# Patient Record
Sex: MALE | Race: WHITE | Employment: UNEMPLOYED | URBAN - METROPOLITAN AREA
[De-identification: names, ages, dates, MRNs, and addresses within clinical notes are randomized per-mention and may not be internally consistent; named-entity substitution may affect disease eponyms.]

---

## 2024-10-27 ENCOUNTER — HOSPITAL ENCOUNTER (INPATIENT)
Age: 89
LOS: 13 days | Discharge: HOME | DRG: 057 | End: 2024-11-09
Attending: STUDENT IN AN ORGANIZED HEALTH CARE EDUCATION/TRAINING PROGRAM | Admitting: STUDENT IN AN ORGANIZED HEALTH CARE EDUCATION/TRAINING PROGRAM
Payer: MEDICARE

## 2024-10-27 DIAGNOSIS — R00.1 BRADYCARDIA: ICD-10-CM

## 2024-10-27 DIAGNOSIS — I63.9 ACUTE CVA (CEREBROVASCULAR ACCIDENT) (HCC): Primary | ICD-10-CM

## 2024-10-27 LAB
GLUCOSE BLD-MCNC: 110 MG/DL (ref 70–99)
GLUCOSE BLD-MCNC: 129 MG/DL (ref 70–99)
PERFORMED ON: ABNORMAL
PERFORMED ON: ABNORMAL

## 2024-10-27 PROCEDURE — 1280000000 HC REHAB R&B

## 2024-10-27 PROCEDURE — 1200000000 HC SEMI PRIVATE

## 2024-10-27 PROCEDURE — 6370000000 HC RX 637 (ALT 250 FOR IP): Performed by: STUDENT IN AN ORGANIZED HEALTH CARE EDUCATION/TRAINING PROGRAM

## 2024-10-27 RX ORDER — GLUCAGON 1 MG/ML
1 KIT INJECTION PRN
Status: DISCONTINUED | OUTPATIENT
Start: 2024-10-27 | End: 2024-11-09 | Stop reason: HOSPADM

## 2024-10-27 RX ORDER — BISACODYL 5 MG/1
10 TABLET, DELAYED RELEASE ORAL DAILY PRN
Status: DISCONTINUED | OUTPATIENT
Start: 2024-10-27 | End: 2024-11-09 | Stop reason: HOSPADM

## 2024-10-27 RX ORDER — ATORVASTATIN CALCIUM 40 MG/1
40 TABLET, FILM COATED ORAL NIGHTLY
Status: DISCONTINUED | OUTPATIENT
Start: 2024-10-27 | End: 2024-11-09 | Stop reason: HOSPADM

## 2024-10-27 RX ORDER — AMLODIPINE BESYLATE 5 MG/1
10 TABLET ORAL DAILY
Status: DISCONTINUED | OUTPATIENT
Start: 2024-10-28 | End: 2024-11-09 | Stop reason: HOSPADM

## 2024-10-27 RX ORDER — AMLODIPINE BESYLATE 10 MG/1
10 TABLET ORAL DAILY
Status: ON HOLD | COMMUNITY
End: 2024-11-08

## 2024-10-27 RX ORDER — POLYETHYLENE GLYCOL 3350 17 G/17G
17 POWDER, FOR SOLUTION ORAL DAILY
Status: ON HOLD | COMMUNITY
End: 2024-11-08

## 2024-10-27 RX ORDER — MECOBALAMIN 5000 MCG
5 TABLET,DISINTEGRATING ORAL NIGHTLY PRN
Status: DISCONTINUED | OUTPATIENT
Start: 2024-10-27 | End: 2024-11-09 | Stop reason: HOSPADM

## 2024-10-27 RX ORDER — BISACODYL 10 MG
10 SUPPOSITORY, RECTAL RECTAL DAILY PRN
Status: DISCONTINUED | OUTPATIENT
Start: 2024-10-27 | End: 2024-11-09 | Stop reason: HOSPADM

## 2024-10-27 RX ORDER — ONDANSETRON 4 MG/1
4 TABLET, ORALLY DISINTEGRATING ORAL EVERY 8 HOURS PRN
Status: DISCONTINUED | OUTPATIENT
Start: 2024-10-27 | End: 2024-11-09 | Stop reason: HOSPADM

## 2024-10-27 RX ORDER — HEPARIN SODIUM 5000 [USP'U]/ML
5000 INJECTION, SOLUTION INTRAVENOUS; SUBCUTANEOUS EVERY 8 HOURS SCHEDULED
Status: DISCONTINUED | OUTPATIENT
Start: 2024-10-28 | End: 2024-11-09 | Stop reason: HOSPADM

## 2024-10-27 RX ORDER — BISACODYL 5 MG/1
10 TABLET, DELAYED RELEASE ORAL DAILY PRN
Status: ON HOLD | COMMUNITY
End: 2024-11-08

## 2024-10-27 RX ORDER — ASPIRIN 325 MG
325 TABLET ORAL DAILY
Status: ON HOLD | COMMUNITY
End: 2024-11-08

## 2024-10-27 RX ORDER — ASPIRIN 325 MG
325 TABLET, DELAYED RELEASE (ENTERIC COATED) ORAL DAILY
Status: DISCONTINUED | OUTPATIENT
Start: 2024-10-28 | End: 2024-11-09 | Stop reason: HOSPADM

## 2024-10-27 RX ORDER — HYDRALAZINE HYDROCHLORIDE 10 MG/1
10 TABLET, FILM COATED ORAL EVERY 6 HOURS PRN
Status: DISCONTINUED | OUTPATIENT
Start: 2024-10-27 | End: 2024-11-09 | Stop reason: HOSPADM

## 2024-10-27 RX ORDER — SENNA AND DOCUSATE SODIUM 50; 8.6 MG/1; MG/1
1 TABLET, FILM COATED ORAL NIGHTLY
Status: ON HOLD | COMMUNITY
End: 2024-11-08

## 2024-10-27 RX ORDER — VALSARTAN 80 MG/1
80 TABLET ORAL 2 TIMES DAILY
Status: DISCONTINUED | OUTPATIENT
Start: 2024-10-27 | End: 2024-11-09 | Stop reason: HOSPADM

## 2024-10-27 RX ORDER — CLOPIDOGREL BISULFATE 75 MG/1
75 TABLET ORAL DAILY
Status: DISCONTINUED | OUTPATIENT
Start: 2024-10-28 | End: 2024-11-09 | Stop reason: HOSPADM

## 2024-10-27 RX ORDER — ATORVASTATIN CALCIUM 40 MG/1
40 TABLET, FILM COATED ORAL NIGHTLY
Status: ON HOLD | COMMUNITY
End: 2024-11-08

## 2024-10-27 RX ORDER — ACETAMINOPHEN 325 MG/1
650 TABLET ORAL EVERY 4 HOURS PRN
Status: DISCONTINUED | OUTPATIENT
Start: 2024-10-27 | End: 2024-11-09 | Stop reason: HOSPADM

## 2024-10-27 RX ORDER — VALSARTAN 80 MG/1
80 TABLET ORAL 2 TIMES DAILY
Status: ON HOLD | COMMUNITY
End: 2024-11-08

## 2024-10-27 RX ORDER — DEXTROSE MONOHYDRATE 100 MG/ML
INJECTION, SOLUTION INTRAVENOUS CONTINUOUS PRN
Status: DISCONTINUED | OUTPATIENT
Start: 2024-10-27 | End: 2024-11-09 | Stop reason: HOSPADM

## 2024-10-27 RX ORDER — POLYETHYLENE GLYCOL 3350 17 G/17G
17 POWDER, FOR SOLUTION ORAL DAILY
Status: DISCONTINUED | OUTPATIENT
Start: 2024-10-28 | End: 2024-11-09 | Stop reason: HOSPADM

## 2024-10-27 RX ORDER — INSULIN LISPRO 100 [IU]/ML
0-4 INJECTION, SOLUTION INTRAVENOUS; SUBCUTANEOUS
Status: DISCONTINUED | OUTPATIENT
Start: 2024-10-27 | End: 2024-11-09 | Stop reason: HOSPADM

## 2024-10-27 RX ORDER — CLOPIDOGREL BISULFATE 75 MG/1
75 TABLET ORAL DAILY
Status: ON HOLD | COMMUNITY
End: 2024-11-08

## 2024-10-27 RX ORDER — SENNA AND DOCUSATE SODIUM 50; 8.6 MG/1; MG/1
1 TABLET, FILM COATED ORAL NIGHTLY
Status: DISCONTINUED | OUTPATIENT
Start: 2024-10-27 | End: 2024-10-30

## 2024-10-27 RX ADMIN — ATORVASTATIN CALCIUM 40 MG: 40 TABLET, FILM COATED ORAL at 20:27

## 2024-10-27 RX ADMIN — VALSARTAN 80 MG: 80 TABLET, FILM COATED ORAL at 20:26

## 2024-10-27 RX ADMIN — METFORMIN HYDROCHLORIDE 500 MG: 500 TABLET ORAL at 18:22

## 2024-10-27 RX ADMIN — SENNOSIDES AND DOCUSATE SODIUM 1 TABLET: 50; 8.6 TABLET ORAL at 20:27

## 2024-10-27 NOTE — PLAN OF CARE
ARU PATIENT TREATMENT PLAN  [facility]  [address] [place]  [address]  [phone]    Akbar LEE Albertojena    : 1935  [facility] #: [id number]  MRN: [id number]   PHYSICIAN:  Geri Fraire MD  Primary Problem    Patient Active Problem List   Diagnosis    Acute CVA (cerebrovascular accident) (HCC)       Rehabilitation Diagnosis:     Acute CVA (cerebrovascular accident) (HCC) [I63.9]       ADMIT DATE:10/27/2024    Patient Goals: ls : \"to get outta here...to use my hand (right)\"     Admitting Impairments: Pt. Admitted d/t CVA resulting in Decreased functional mobility ;Decreased cognition;Decreased fine motor control;Decreased endurance;Decreased ADL status;Decreased ROM;Decreased sensation;Decreased coordination;Decreased posture;Decreased balance;Decreased safe awareness;Decreased vision/visual deficit;Decreased strength   Barriers: level of assistance, endurance, pain, comorbidities   Participation: Fair     CARE PLAN     NURSING:  Akbar Arias while on this unit will:     [x] Be continent of bowel and bladder     [x] Have an adequate number of bowel movements  [x] Urinate with no urinary retention >300ml in bladder  [] Complete bladder protocol with palomo removal  [x] Maintain O2 SATs at __94_%  [] Have pain managed while on ARU       [] Be pain free by discharge   [x] Have no skin breakdown while on ARU  [] Have improved skin integrity via wound measurements  [] Have no signs/symptoms of infection at the wound site  [x] Be free from injury during hospitalization   [x] Complete education with patient/family with understanding demonstrated for:  [] Adjustment   [] Other:   Nursing interventions may include bowel/bladder training, education for medical assistive devices, medication education, O2 saturation management, energy conservation, stress management techniques, fall prevention, alarms protocol, seating and positioning, skin/wound care, pressure relief  instruction,dressing changes,  infection protection, DVT prophylaxis, and/or assistance with in room safety with transfers to bed, toilet, wheelchair, shower as well as bathroom activities and hygiene.     Patient/caregiver education for:   [x] Disease/sustained injury/management      [x] Medication Use   [] Surgical intervention   [x] Safety   [] Body mechanics and or joint protection   [x] Health maintenance         PHYSICAL THERAPY:  Goals:                  Short Term Goals  Time Frame for Short Term Goals: 1 week 11/02/2024  Short Term Goal 1: Pt will complete bed mobility with IND  Short Term Goal 2: Pt will complete functional t/f with LRAD with SBA  Short Term Goal 3: Pt will ambulate >150' with LRAD with SBA  Short Term Goal 4: Pt will ascend/descend 12 steps with HR with SBA            Long Term Goals  Time Frame for Long Term Goals : 2 weeks 11/09/2024  Long Term Goal 1: Pt will demonstrate functional t/f with Ciro  Long Term Goal 2: Pt will ambulate >150' with LRAD with Ciro  Long Term Goal 3: Pt will ascend 12 setps with 1-2 HR with Ciro  Long Term Goal 4: Pt will demonstrate improved score on Pena to >44 to demonstrate decreased risk of falls.  These goals were reviewed with this patient at the time of assessment and Akbar Arias is in agreement.     Plan of Care: Pt to be seen 5 out of 7 days per week, 60   mins (exact) per day for 14 days (exact)                   Current Treatment Recommendations: Strengthening, Balance training, ROM, Functional mobility training, Transfer training, Cognitive/Perceptual training, Endurance training, Gait training, Stair training, Neuromuscular re-education, Cognitive reorientation, Home exercise program, Safety education & training, Patient/Caregiver education & training, Equipment evaluation, education, & procurement, Positioning, Therapeutic activities      OCCUPATIONAL THERAPY:  Goals:             Short Term Goals  Time Frame for Short Term Goals: 6 days-     Object CARE Score: 3 Discharge Goal: Independent   Wheel 50 feet, 2 turns       Wheel 150 Feet              Akbar Arias will be seen a minimum of 3 hours of therapy per day, a minimum of 5 out of 7 days per week.    [] In this rare instance due to the nature of this patient's medical involvement, this patient will be seen 15 hours per week (900 minutes within a 7 day period).    Treatments may include therapeutic exercises, gait training, neuromuscular re-ed, transfer training, community reintegration, bed mobility, w/c mobility and training, self care, home mgmt, cognitive training, energy conservation,dysphagia tx, speech/language/communication therapy, group therapy, and patient/family education. In addition, dietician/nutritionist may monitor calorie count as well as intake and collaboratively work with SLP on dietary upgrades.  Neuropsychology/Psychology may evaluate and provide necessary support.    Medical issues being managed closely and that require 24 hour availability of a physician:   [] Swallowing Precautions  [x] Bowel/Bladder Fx  [] Weight bearing precautions   [] Wound Care    [] Pain Mgmt   [x] Infection Protection   [x] DVT Prophylaxis   [x] Fall Precautions  [x] Fluid/Electrolyte/Nutrition Balance   [] Voice Protection   [] Respiratory  [] Other:    Medical Prognosis: [x] Good  [] Fair    [] Guarded   Total expected IRF days 14  Anticipated discharge destination:    [] Home Independently   [] Home Modified Independent  [x] Home with supervision    []SNF     [] Other                                           Physician anticipated functional outcomes:  Home w/ supervision and outpatient services.    IPOC brief synthesis: Akbar Arias is an 88 year old male with a past medical history significant for HTN and HLD who presented to [facility] on 10/20/24 with 3-4 weeks of RUE weakness and decreased responsiveness with aphasia, found to have left frontoparietal stroke. He was admitted to [facility] on

## 2024-10-27 NOTE — PROGRESS NOTES
IRF MAMADOU Admission Assessment  [facility] Acute Rehab Unit    Patient:Akbar Arias     Rehab Dx/Hx: Acute CVA (cerebrovascular accident) (HCC) [I63.9]   :1935  MRN:[id number]  Date of Admit: 10/27/2024     Pain Effect on Sleep:  Over the past 5 days, how much of the time has pain made it hard for you to sleep at night?  []  0. Does not apply - I have not had any pain or hurting in the past 5 days  [x]  1. Rarely or not at all  []  2. Occasionally  []  3. Frequently  []  4. Almost constantly  []  8. Unable to answer    Over the past 5 days, how often have you limited your participation in rehabilitation therapy sessions due to pain?  []  0. Does not apply - I have not received rehabilitation therapy in the past 5 days  [x]  1. Rarely or not at all  []  2. Occasionally  []  3. Frequently  []  4. Almost constantly  []  8. Unable to answer    Pain Interference with Day-to-Day Activities:  Over the past 5 days, how often have you limited your day-to-day activities (excluding rehabilitation therapy session)?  [x]  1. Rarely or not at all  []  2. Occasionally  []  3. Frequently  []  4. Almost constantly  []  8. Unable to answer      High-Risk Drug Classes: Use and Indication   Is taking: Check if the pt is taking any medications by pharmacological classification  Indication noted: If column 1 is checked, check if there is an indication noted for all meds in the drug class Is taking  (check all that apply) Indication noted (check all that apply)   Antipsychotic [] []   Anticoagulant [] []   Antibiotic [] []   Opioid [] []   Antiplatelet [x] [x]   Hypoglycemic (including insulin) [x] [x]   None of the above [] []     Special Treatments, Procedures, and Programs   Check all of the following treatments, procedures, and programs that apply on admission.  On admission (check all that apply)   Cancer Treatments    A1. Chemotherapy []   A2. IV []   A3. Oral []   A10. Other []   B1. Radiation []   Respiratory

## 2024-10-27 NOTE — PROGRESS NOTES
NURSING ASSESSMENT: ARU ADMISSION  [facility]    Patient:Akbar Arias     Rehab Dx/Hx: Acute CVA (cerebrovascular accident) (HCC) [I63.9]   :1935  MRN:[id number]  Date of Admit: 10/27/2024  Room #: 0152/0152-01    Subjective:   Patient admitted to room 152 from  via Stretcher.   Alert and oriented x3.  Oriented to room and call light system. Oriented to rehab routine and therapy schedules. Informed about care conferences and ordering of meals with PCA.    Drug / Medication Review:   Medications were reviewed by RN at time of admission  [x]  No potential or actual clinically significant medication issues were noted.   []  Potential or actual clinically significant medication issues were found and MD was notified. (Specified below if applicable)   []  Allergy to medication   []  Drug interactions (drug/drug, drug/food, drug/disease interactions)   []  Duplicate drug   []  Omission (drug missing from prescribed regimen)   []  Non adherence   []  Adverse reaction   []  Wrong patient, drug, dose, route, time error   []  Ineffective drug therapy    Section GG: Rolling Right and Left   []  Code 06, Independent: if the patient completes the activity by themself with no assistance from a helper.   []  Code 05, Setup or clean up assist: if the helper sets up or cleans up; patient completes activity   [x]  Code 04, Supervision or touching assist: If the helper provides verbal cues and/or touching/steadying and/or contact guard assistance as patient completes activity   []  Code 03, Partial/Moderate Assist: If the helper does LESS THAN HALF the effort. [place] lifts, holds, or supports trunk or limbs, but provides less than half the effort.   []  Code 02, Substantial/Maximal Assist: if the helper does MORE THAN HALF the effort. [place] lifts or holds trunk or limbs and provides more than half the effort.  []  Code 01,Dependent: If the helper does ALL of the effort. Patient does none of the

## 2024-10-28 LAB
ANION GAP SERPL CALCULATED.3IONS-SCNC: 8 MMOL/L (ref 3–16)
BASOPHILS # BLD: 0.1 K/UL (ref 0–0.2)
BASOPHILS NFR BLD: 0.9 %
BUN SERPL-MCNC: 28 MG/DL (ref 7–20)
CALCIUM SERPL-MCNC: 9.6 MG/DL (ref 8.3–10.6)
CHLORIDE SERPL-SCNC: 107 MMOL/L (ref 99–110)
CO2 SERPL-SCNC: 25 MMOL/L (ref 21–32)
CREAT SERPL-MCNC: 1.5 MG/DL (ref 0.8–1.3)
DEPRECATED RDW RBC AUTO: 13.7 % (ref 12.4–15.4)
EOSINOPHIL # BLD: 0.2 K/UL (ref 0–0.6)
EOSINOPHIL NFR BLD: 3.6 %
GFR SERPLBLD CREATININE-BSD FMLA CKD-EPI: 45 ML/MIN/{1.73_M2}
GLUCOSE BLD-MCNC: 105 MG/DL (ref 70–99)
GLUCOSE BLD-MCNC: 118 MG/DL (ref 70–99)
GLUCOSE BLD-MCNC: 120 MG/DL (ref 70–99)
GLUCOSE BLD-MCNC: 154 MG/DL (ref 70–99)
GLUCOSE SERPL-MCNC: 122 MG/DL (ref 70–99)
HCT VFR BLD AUTO: 35 % (ref 40.5–52.5)
HGB BLD-MCNC: 11.6 G/DL (ref 13.5–17.5)
LYMPHOCYTES # BLD: 1.1 K/UL (ref 1–5.1)
LYMPHOCYTES NFR BLD: 18 %
MCH RBC QN AUTO: 31.5 PG (ref 26–34)
MCHC RBC AUTO-ENTMCNC: 33.1 G/DL (ref 31–36)
MCV RBC AUTO: 95.4 FL (ref 80–100)
MONOCYTES # BLD: 0.8 K/UL (ref 0–1.3)
MONOCYTES NFR BLD: 13.2 %
NEUTROPHILS # BLD: 4.1 K/UL (ref 1.7–7.7)
NEUTROPHILS NFR BLD: 64.3 %
PERFORMED ON: ABNORMAL
PLATELET # BLD AUTO: 278 K/UL (ref 135–450)
PMV BLD AUTO: 9.5 FL (ref 5–10.5)
POTASSIUM SERPL-SCNC: 4.2 MMOL/L (ref 3.5–5.1)
RBC # BLD AUTO: 3.67 M/UL (ref 4.2–5.9)
SODIUM SERPL-SCNC: 140 MMOL/L (ref 136–145)
WBC # BLD AUTO: 6.3 K/UL (ref 4–11)

## 2024-10-28 PROCEDURE — 36415 COLL VENOUS BLD VENIPUNCTURE: CPT

## 2024-10-28 PROCEDURE — 1280000000 HC REHAB R&B

## 2024-10-28 PROCEDURE — 6360000002 HC RX W HCPCS: Performed by: STUDENT IN AN ORGANIZED HEALTH CARE EDUCATION/TRAINING PROGRAM

## 2024-10-28 PROCEDURE — 85025 COMPLETE CBC W/AUTO DIFF WBC: CPT

## 2024-10-28 PROCEDURE — 97116 GAIT TRAINING THERAPY: CPT

## 2024-10-28 PROCEDURE — 97162 PT EVAL MOD COMPLEX 30 MIN: CPT

## 2024-10-28 PROCEDURE — 97110 THERAPEUTIC EXERCISES: CPT

## 2024-10-28 PROCEDURE — 6370000000 HC RX 637 (ALT 250 FOR IP): Performed by: STUDENT IN AN ORGANIZED HEALTH CARE EDUCATION/TRAINING PROGRAM

## 2024-10-28 PROCEDURE — 97112 NEUROMUSCULAR REEDUCATION: CPT

## 2024-10-28 PROCEDURE — 80048 BASIC METABOLIC PNL TOTAL CA: CPT

## 2024-10-28 PROCEDURE — 97530 THERAPEUTIC ACTIVITIES: CPT

## 2024-10-28 PROCEDURE — 96125 COGNITIVE TEST BY HC PRO: CPT

## 2024-10-28 PROCEDURE — 97167 OT EVAL HIGH COMPLEX 60 MIN: CPT

## 2024-10-28 PROCEDURE — 1200000000 HC SEMI PRIVATE

## 2024-10-28 RX ADMIN — VALSARTAN 80 MG: 80 TABLET, FILM COATED ORAL at 09:11

## 2024-10-28 RX ADMIN — HEPARIN SODIUM 5000 UNITS: 5000 INJECTION INTRAVENOUS; SUBCUTANEOUS at 14:12

## 2024-10-28 RX ADMIN — AMLODIPINE BESYLATE 10 MG: 5 TABLET ORAL at 09:11

## 2024-10-28 RX ADMIN — CLOPIDOGREL BISULFATE 75 MG: 75 TABLET ORAL at 09:10

## 2024-10-28 RX ADMIN — ATORVASTATIN CALCIUM 40 MG: 40 TABLET, FILM COATED ORAL at 21:15

## 2024-10-28 RX ADMIN — Medication 5 MG: at 21:12

## 2024-10-28 RX ADMIN — VALSARTAN 80 MG: 80 TABLET, FILM COATED ORAL at 21:12

## 2024-10-28 RX ADMIN — ASPIRIN 325 MG: 325 TABLET, COATED ORAL at 09:11

## 2024-10-28 RX ADMIN — HEPARIN SODIUM 5000 UNITS: 5000 INJECTION INTRAVENOUS; SUBCUTANEOUS at 21:16

## 2024-10-28 RX ADMIN — ACETAMINOPHEN 650 MG: 325 TABLET ORAL at 21:13

## 2024-10-28 RX ADMIN — METFORMIN HYDROCHLORIDE 500 MG: 500 TABLET ORAL at 16:55

## 2024-10-28 RX ADMIN — HEPARIN SODIUM 5000 UNITS: 5000 INJECTION INTRAVENOUS; SUBCUTANEOUS at 05:39

## 2024-10-28 RX ADMIN — METFORMIN HYDROCHLORIDE 500 MG: 500 TABLET ORAL at 09:11

## 2024-10-28 RX ADMIN — SENNOSIDES AND DOCUSATE SODIUM 1 TABLET: 50; 8.6 TABLET ORAL at 21:12

## 2024-10-28 ASSESSMENT — PAIN DESCRIPTION - ORIENTATION: ORIENTATION: OTHER (COMMENT)

## 2024-10-28 ASSESSMENT — PAIN DESCRIPTION - LOCATION: LOCATION: GENERALIZED

## 2024-10-28 ASSESSMENT — 9 HOLE PEG TEST
TEST_RESULT: IMPAIRED
TEST_RESULT: IMPAIRED
TESTTIME_SECONDS: 38

## 2024-10-28 ASSESSMENT — PAIN DESCRIPTION - DESCRIPTORS: DESCRIPTORS: ACHING

## 2024-10-28 ASSESSMENT — PAIN SCALES - GENERAL: PAINLEVEL_OUTOF10: 5

## 2024-10-28 NOTE — PROGRESS NOTES
Comprehensive Nutrition Assessment    Type and Reason for Visit:  Initial, Consult (ONS, weight loss and decreased appetite)    Nutrition Recommendations/Plan:   Continue 60gm/meal diet to better manage blood sugars - recommend liberalization of diet if PO intakes decrease.   Continue Glucerna supplements with BLD - pt prefers chocolate.   Encourage >50% of all meals.   Monitor labs, weight, acceptance of supplements.      Malnutrition Assessment:  Malnutrition Status:  At risk for malnutrition (Comment) (d/t unintentional weight loss and predicted suboptimal intakes) (10/28/24 1319)    Context:  Acute Illness     Findings of the 6 clinical characteristics of malnutrition:  Energy Intake:  Mild decrease in energy intake (Comment) (51-75% PO intake x1 meal)  Weight Loss:  No significant weight loss     Body Fat Loss:  Mild body fat loss Triceps   Muscle Mass Loss:  Mild muscle mass loss Temples (temporalis)  Fluid Accumulation:  No significant fluid accumulation     Strength:  Not Performed    Nutrition Assessment:    Patient seen for nutrition consult for ONS, weight loss, decreased appetite. Admitted for CVA. Unsure of PMH at this time, no H&P currently documented. Patient nutritionally at risk AEB weight loss. Patient seen in room eating lunch, pts wife also in room. Reports his appetite is good, eats a \"late breakfast and early dinner\" at home. States he has lost ~15lbs in the past few months, unintentionally. Denies any issues chewing or swallowing. Reports he likes chocolate oral nutrition supplements, drinking one with each meal while admitted. Will continue to monitor.    Nutrition Related Findings:    BUN 28, Creatinine 1.5, GFR 45, Glucose 122-171, nonpitting edema RUE/RLE/LLE, last BM 10/25 Wound Type: Multiple (buttocks/coccyx/sacrum)       Current Nutrition Intake & Therapies:    Average Meal Intake: 51-75%  Average Supplements Intake: Unable to assess  ADULT ORAL NUTRITION SUPPLEMENT; Breakfast;

## 2024-10-28 NOTE — PLAN OF CARE
SLP completed evaluation. Please refer to notes in EMR.      Gogo Benitez MA CCC-SLP #[id number]  Speech Language Pathologist

## 2024-10-28 NOTE — PROGRESS NOTES
Speech Language Pathology  Facility/Department: [facility]  Initial Speech/Language/Cognitive Assessment       NAME:Akbar Arias  : 1935 (88 y.o.)   MRN: [id number]  ROOM: 0152/0152-01  ADMISSION DATE: 10/27/2024  PATIENT DIAGNOSIS(ES): Acute CVA (cerebrovascular accident) (HCC) [I63.9]  Patient Active Problem List    Diagnosis Date Noted    Acute CVA (cerebrovascular accident) (HCC) 10/27/2024     Past Medical History:   Diagnosis Date    Hyperlipidemia     Hypertension      Past Surgical History:   Procedure Laterality Date    BACK SURGERY      lumbar    EYE SURGERY Right 2013    cataract extraction with IOL implant    EYE SURGERY Left 13    phaco with IOL     No Known Allergies    Date of Evaluation: 10/28/2024   Evaluating Therapist: YUNG Machado    Subjective:   Chart Reviewed: : [x] Yes [] No    Onset Date: pt admitted to [facility] 10/20/24. Pt admitted to [facility] 10/27/24    Recent Results of CT of Head:  Date: 10/22/24 at [facility]  IMPRESSION:     1. No acute intracranial hemorrhage.   2.  Evolving acute left parietal infarct, without evidence for hemorrhagic conversion.     Medical record review/interview: Per MD H&P on 10/28/24: \"Akbar Arias is an 88 year old male with a past medical history significant for HTN and HLD who presented to [facility] on 10/20/24 with 3-4 weeks of RUE weakness and decreased responsiveness with aphasia. CT Head showed hypoattenuation in the left frontal lobe concerning for early MCA CVA, late acute to subacute left parietal infarct, age-indeterminate right thalamic lacunar infarct, small low-density left cerebral convexity subdural collection, and small right posterior scalp hematoma. CTA head and neck showed distal M2 occlusion and left ICA thrombus. MRI brain showed left frontoparietal CVA. He was not a candidate for tPA due to being outside of the window. Course was notable for HTN and JUAQUIN. He was admitted to [facility] on 10/27 due to functional deficits below  problem solving and thought organization tasks with 80% acc given min cues   Goal 4: Pt will complete verbal and visual reasoning tasks with 80% acc given min cues     Objective:   Cranial nerve / Oral motor exam:   CN V (trigeminal): ophthalmic, maxillary, and mandibular facial sensation- WNL  CN VII (facial): WNL  CN IX/X (glossopharyngeal/vagus): MPT: DNT; pitch range: DNT; vocal quality: WNL; cough: Strong-perceptually  CN XII (hypoglossal): WNL       Oral motor exam   See above     Dentition: intact    Motor Speech: WFL      Comprehension:   Auditory Comprehension: Mild  Deficits: Two-step Commands and Multi-step commands    Reading Comprehension: To be assessed      Expression:   Primary Mode of Expression: Verbal  Verbal Expression: Mild  Deficits: Divergent, Responsive, and Conversation    Written Expression: To be assessed; Dominant Hand: Right     Pragmatics/Social Functioning: WFL      Cognition:  Overall Orientation : .Mild   Deficits: Pt oriented to self, city, place, [place]. Pt not oriented to month (Nov), year, or date       Attention: WFL    Memory: Moderate   Deficits: Short-term Memory, [place] memory, and Immediate/Working Memory    Problem Solving: Moderate and Severe   Deficits: Complex Functional Tasks, Managing Finances, Managing Medications, Simple Functional Tasks, and Verbal Reasoning    Numeric Reasoning: Moderate   Deficits: Calculations, Money Management, and Time    Abstract Reasoning: Moderate   Deficits: Convergent Thinking and Divergent Thinking    Safety/Judgement: Moderate   Deficits:: Insight, Impulsive, and Flexibility of thought    Voice:   Voice: WFL       Plan  Prognosis:  Speech Therapy Prognosis  Prognosis: Fair  Prognosis Considerations: Age, Motivation, Participation Level, Potential, Previous Level of Function , and Severity of impairments   Individuals consulted and agree with results and recommendations: Patient , RN, and OT/PT  Safety Devices in place: Yes  Type of Devices:

## 2024-10-28 NOTE — H&P
Patient: Akbar Arias  [id number]  Date: 10/28/2024      Chief Complaint: right sided weakness    History of Present Illness/Hospital Course:  Akbar Arias is an 88 year old male with a past medical history significant for HTN and HLD who presented to [facility] on 10/20/24 with 3-4 weeks of RUE weakness and decreased responsiveness with aphasia. CT Head showed hypoattenuation in the left frontal lobe concerning for early MCA CVA, late acute to subacute left parietal infarct, age-indeterminate right thalamic lacunar infarct, small low-density left cerebral convexity subdural collection, and small right posterior scalp hematoma. CTA head and neck showed distal M2 occlusion and left ICA thrombus. MRI brain showed left frontoparietal CVA. He was not a candidate for tPA due to being outside of the window. Course was notable for HTN and JUAQUIN. He was admitted to [facility] on 10/27 due to functional deficits below his baseline. Today he is seen with his wife present. He reports continued right arm weakness. He reports previously being independent. He lives with his wife, son, and grandson. They live in a 2 level house, but he stays on the main floor. There are 2 ALIA. 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     Prior Level of Function:  Independent for self care, indoor mobility, stairs, functional cognition     Current Level of Function:  Supervision for sit to lying, lying to sitting on side of bed  Mod assist for sit to stand, walk 10 feet, walk 50 feet with two turns  Max assist for lower body dressing     has a past medical history of Hyperlipidemia and Hypertension.     has a past surgical history that includes back surgery (1972); eye surgery (Right, 5/31/2013); and eye surgery (Left, 6/14/13).     reports that he has never smoked. He has never used smokeless tobacco. He reports that he does not drink alcohol.    family history is not on file.    Current Facility-Administered Medications   Medication Dose Route  complications, rehabilitation services cannot be safely provided at a lower level of care such as a skilled nursing facility. I have compared the patients medical and functional status at the time of the preadmission screening and the same on this date, and there are no significant changes.     By signing this document, I acknowledge that I have personally performed a full physical examination on this patient within 24 hours of admission to this inpatient rehabilitation facility and have determined the patient to be able to tolerate the above course of treatment at an intensive level for a reasonable period of time. I will be completing a detailed individualized Plan of Care for this patient by day four of the patients stay based upon the Preadmission Screen, this Post-Admission Evaluation, and the therapy evaluations.    Rehabilitation Diagnosis:  Stroke, 1.2, Right Body (L Brain)    Assessment and Plan:  Akbar Arias is an 88 year old male with a past medical history significant for HTN and HLD who presented to [facility] on 10/20/24 with 3-4 weeks of RUE weakness and decreased responsiveness with aphasia, found to have left frontoparietal stroke. He was admitted to [facility] on 10/27 due to functional deficits below his baseline.     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  IMC if PVR > 200ml or if any volume is > 500 ml.     Sleep: melatonin provided prn.     PPX  DVT:

## 2024-10-28 NOTE — PROGRESS NOTES
LOB, poor wayfinding and navigation ability.  Gait Deviations: Increased ADIN;Decreased head and trunk rotation;Deviated path  Distance: 200' + 50' (including 10' uneven) + 200' + 300'  Comments: Gait speed: 1.02 m/sec    Stairs/Curb  Stairs?: Yes  Stairs  # Steps : 12  Stairs Height: 6\"  Rails: Bilateral  Assistance: Contact guard assistance  Comment: Reciprocal pattern, increased time to complete         ASSESSMENT  Vitals  Pulse: 50  Heart Rate Source: Monitor  SpO2: 92 %  O2 Device: None (Room air)  BP Location: Left upper arm  BP Method: Automatic  Patient Position: Sitting    Activity Tolerance  Activity Tolerance: Patient tolerated evaluation without incident    Assessment  Assessment: Pt is an 88 y.o. male admitted to [facility] secondary to CVA. Pt lives with wife in two level home with 2 ALIA but stays on first floor. Pt reports he is typically IND with mobility and gait without AD. Pt is currently functioning below his baseline demonstrating bed mobility with SBA, t/f with CGA to Kaylen, gait without AD with CGA and stairs with HR with CGA. Pt presents with decreased coordination, decreased propriception and sensation on R with UE affected >LE and overall demonstrates decreased motor control, planning and coordination. Pt with difficulty fully understanding commands/instructions at times limiting full assessment of strength/ coordination. Pt does complete BBS with score of 18 demonstrating high fall risk. Pt attempts 5x STS but requires use of LE and completes in 22 seconds demonstrating decreased functional strength. Pt does demonstrate gait speed of 1.02m/sec placing pt as community ambulator. Pt will benefit from continued skilled PT in ARU to address above deficits. Recommend pt d/c home with 24hrA and OP PT. No DME needs anticipated  Therapy Prognosis: Good  Decision Making: Medium Complexity  Barriers to Learning: cognition, command following  Discharge Recommendations: 24 hour supervision or assist;Outpatient  PT  PT D/C Equipment  Other: No DME needs anticipated  PT Equipment Recommendations  Equipment Needed: No  Other: No DME needs anticipated    CLINICAL IMPRESSION   Pt is an 88 y.o. male admitted to [facility] secondary to CVA. Pt lives with wife in two level home with 2 ALIA but stays on first floor. Pt reports he is typically IND with mobility and gait without AD. Pt is currently functioning below his baseline demonstrating bed mobility with SBA, t/f with CGA to Kaylen, gait without AD with CGA and stairs with HR with CGA. Pt presents with decreased coordination, decreased propriception and sensation on R with UE affected >LE and overall demonstrates decreased motor control, planning and coordination. Pt with difficulty fully understanding commands/instructions at times limiting full assessment of strength/ coordination. Pt does complete BBS with score of 18 demonstrating high fall risk. Pt attempts 5x STS but requires use of LE and completes in 22 seconds demonstrating decreased functional strength. Pt does demonstrate gait speed of 1.02m/sec placing pt as community ambulator. Pt will benefit from continued skilled PT in ARU to address above deficits. Recommend pt d/c home with 24hrA and OP PT. No DME needs anticipated    GOALS  Patient Goals   Patient Goals : \"Get out of here and be able to use my R hand\"  Short Term Goals  Time Frame for Short Term Goals: 1 week 11/02/2024  Short Term Goal 1: Pt will complete bed mobility with IND  Short Term Goal 2: Pt will complete functional t/f with LRAD with SBA  Short Term Goal 3: Pt will ambulate >150' with LRAD with SBA  Short Term Goal 4: Pt will ascend/descend 12 steps with HR with SBA  Long Term Goals  Time Frame for Long Term Goals : 2 weeks 11/09/2024  Long Term Goal 1: Pt will demonstrate functional t/f with Ciro  Long Term Goal 2: Pt will ambulate >150' with LRAD with Ciro  Long Term Goal 3: Pt will ascend 12 setps with 1-2 HR with Ciro  Long Term Goal 4: Pt will

## 2024-10-28 NOTE — PLAN OF CARE
Problem: Safety - Adult  Goal: Free from fall injury  10/28/2024 1043 by Kirsty Ellis, RN  Outcome: Progressing  10/27/2024 2211 by Joyce Melendez, RN  Outcome: Progressing

## 2024-10-28 NOTE — PROGRESS NOTES
PT Functional Outcome measures    BETANCOURT BALANCE SCALE 14-Item Long Form Original Version    Patient Name:  Akbar Arias  Date:  10/28/2024    1. SITTING TO STANDING  INSTRUCTIONS: Please stand up. Try not to use your hands for support.  []4 able to stand without using hands and stabilize independently  []3 able to stand independently using hands  []2 able to stand using hands after several tries  [x]1 needs minimal aid to stand or to stabilize  []0 needs moderate or maximal assist to stand  Score: 1    2. STANDING UNSUPPORTED  INSTRUCTIONS: Please stand for two minutes without holding.  []4 able to stand safely 2 minutes   [x]3 able to stand 2 minutes with supervision  []2 able to stand 30 seconds unsupported  []1 needs several tries to stand 30 seconds unsupported  []0 unable to stand 30 seconds unassisted If a subject is able to stand 2  minutes unsupported, score full points for sitting unsupported. Proceed to  item #4.  Score: 3    3. SITTING WITH BACK UNSUPPORTED BUT FEET SUPPORTED  ON FLOOR OR ON A STOOL  INSTRUCTIONS: Please sit with arms folded for 2 minutes.  [x]4 able to sit safely and securely 2 minutes  []3 able to sit 2 minutes under supervision  []2 able to sit 30 seconds  []1 able to sit 10 seconds  []0 unable to sit without support 10 seconds  Score: 4     4. STANDING TO SITTING  INSTRUCTIONS: Please sit down.  []4 sits safely with minimal use of hands  []3 controls descent by using hands  []2 uses back of legs against chair to control descent  [x]1 sits independently but has uncontrolled descent  []0 needs assistance to sit  Score: 1    5. TRANSFERS  INSTRUCTIONS: Arrange chairs(s) for a pivot transfer. Ask subject to  transfer one way toward a seat with armrests and one way toward a seat  without armrests. You may use two chairs (one with and one without  armrests) or a bed and a chair.  []4 able to transfer safely with minor use of hands  []3 able to transfer safely definite need of hands  []2

## 2024-10-28 NOTE — PROGRESS NOTES
Occupational Therapy  Facility/Department: [facility]  Rehabilitation Occupational Therapy Evaluation/Treatment       Date: 10/28/24  Patient Name: Akbar Arias       Room: 0152/0152-01  MRN: [id number]  Account: [id number]   : 1935  (88 y.o.) Gender: male     Referring Practitioner: Geri Fraire MD  Diagnosis: Acute CVA  Additional Pertinent Hx: HTN    Restrictions  Restrictions/Precautions: Fall Risk;General Precautions  Other position/activity restrictions: up with assistance    Subjective  Subjective: Pt in bed, pleasant, upset about breakfast but agreeable to OT evaluation, no pain.   Pt was bathed during OT session this date. Pt was Showered with soap/water with Minimal Assistance.         Vitals  Temp: 97.5 °F (36.4 °C)  Pulse: 50  Respirations: 18  BP: 135/64  Height: 182.9 cm (6')  Weight - Scale: 87.2 kg (192 lb 3.9 oz)  BMI (Calculated): 26.1  Oxygen Therapy  SpO2: 92 %  O2 Device: None (Room air)  Level of Consciousness: Alert (0)    Objective  Vision - Basic Assessment  Prior Vision: Wears glasses only for reading  Visual History: No significant visual history  Patient Visual Report: Other (add comment) (initially states blurriness with far distances, but then states he can see everything clearly)  Visual Field Cut: No  Oculo Motor Control: WNL  Vision Comments: Decreased attention/scanning to R during functional mobility  Cognition  Overall Cognitive Status: Exceptions  Arousal/Alertness: Appears intact  Following Commands: Follows one step commands with repetition  Attention Span: Attends with cues to redirect;Difficulty dividing attention;Difficulty attending to directions  Memory: Decreased recall of recent events;Decreased short term memory;Decreased recall of biographical Information  Safety Judgement: Decreased awareness of need for assistance;Decreased awareness of need for safety  Problem Solving: Assistance required to correct errors made;Assistance required to identify

## 2024-10-28 NOTE — CARE COORDINATION
Case Management ARU Admission Assessment   [facility]    Patient:Akbar Arias     Rehab Dx/Hx: Acute CVA (cerebrovascular accident) (HCC) [I63.9]   :1935  MRN:[id number]  Date of Admit: 10/27/2024  Room #: 0152/0152-01       Objective:  met with the patient to complete initial assessment and review the role of Case Management while on the ARU. Patient educated on team conferences. Discussed family training with the patient/family on how it is encouraged on the unit. Order for \"discharge planning\" has been addressed.          Family Present: Wife Marielos   Primary : Wife Marielos Arias [phone]   Health Care Decision Maker:    Current PCP:  Damian Smiley MD       Admit date:  10/27/2024   Insurance: [facility]   Precert required for SNF:  [] No       [x] Yes   3 Night stay required: [x] No       [] Yes   Admitting diagnosis: Acute CVA (cerebrovascular accident) (HCC) [I63.9]       Current home situation: Lives with wife Marielos , son Gildardo, and grandchild in two-story house w/ 2STE   DME at home: [x] Walker  Rollator   [] Cane       [] RTS        [] BSC      [] Shower Chair        [] O2         [] HHN     [] CPAP       [] BiPap      [] Hospital Bed       [] W/C        [] Other:    Medical concerns requiring training: Continue to assess   Medication concerns:  Require financial assistance with meds? [x] No       [] If yes, please explain:   [] Yes              Services prior to admission: None   Preference for HHC or OP Therapy: [] Home health       [] Outpatient       [x] No preference  No agency/facility used in the past   Patient's goal(s): Increase strength in arm and improve independence with eating and ADLs   Working or volunteering PTA? Retired       Transportation needs: Active  prior to CVA   Has lack of transportation kept you from medical appointments, meetings, work, or ADL's? [] Yes, it has kept me from medical appointments or from  getting my meds  [] Yes, It has kept me from non-medical meetings, appointments, work, or getting things I need  [x] No  [] Pt unable to respond  [] Pt declines to respond     How often do you need to have someone help you when you read instructions, pamphlets, or other written material from your doctor or pharmacy? [] Never                             [] Always  [] Rarely                            [] Patient unable to respond  [x] Sometimes                     [] Pt declines to respond  [] Often   Active with: [] Senior services        [] [facility]         [x] Other:  None       Dialysis Facility (if applicable) Name: N/A  Address:  Dialysis Schedule:  Phone:  Fax:       Support system at home and in the community: Wife, son, and grandson   Caregiver physical ability: [x] Cue patient for safety  [x] Physical lift/lower: [] Light [] Moderate [] Heavy  [x] Cook / Clean  [x] Transport  *Wife has no medical limitations that would limit providing support   Who will be the one to contact for family training: Wife Marielos   24 hour care on discharge?: Wife Marielos and son Gildardo   What level does the patient need to be at to return home:  Increased independence with eating and ADLs   Discharge plan:  Home with family   Barriers to discharge: Unknown at this time       Ethnicity: Are you of , /a, or Slovak origin?  [x] No, not of , /a, or Slovak origin  [] Yes, Greenlandic, Greenlandic American, Chicano/a  [] Yes, Tristanian  [] Yes, Vineet  [] Yes, another , , or Slovak origin  [] Pt unable to respond  [] Pt declines to respond     Race: [x] White                                                [] New Zealander              []   [] Black or                 [] Cuban          [] Jamaican or Ken  []  or      [] Tajik             [] Grenadian  []                                        [] Mohawk      []

## 2024-10-29 LAB
GLUCOSE BLD-MCNC: 134 MG/DL (ref 70–99)
GLUCOSE BLD-MCNC: 141 MG/DL (ref 70–99)
GLUCOSE BLD-MCNC: 142 MG/DL (ref 70–99)
GLUCOSE BLD-MCNC: 143 MG/DL (ref 70–99)
PERFORMED ON: ABNORMAL

## 2024-10-29 PROCEDURE — 97130 THER IVNTJ EA ADDL 15 MIN: CPT

## 2024-10-29 PROCEDURE — 97110 THERAPEUTIC EXERCISES: CPT

## 2024-10-29 PROCEDURE — 97129 THER IVNTJ 1ST 15 MIN: CPT

## 2024-10-29 PROCEDURE — 6370000000 HC RX 637 (ALT 250 FOR IP): Performed by: STUDENT IN AN ORGANIZED HEALTH CARE EDUCATION/TRAINING PROGRAM

## 2024-10-29 PROCEDURE — 97116 GAIT TRAINING THERAPY: CPT

## 2024-10-29 PROCEDURE — 1200000000 HC SEMI PRIVATE

## 2024-10-29 PROCEDURE — 97530 THERAPEUTIC ACTIVITIES: CPT

## 2024-10-29 PROCEDURE — 1280000000 HC REHAB R&B

## 2024-10-29 PROCEDURE — 6360000002 HC RX W HCPCS: Performed by: STUDENT IN AN ORGANIZED HEALTH CARE EDUCATION/TRAINING PROGRAM

## 2024-10-29 PROCEDURE — 97112 NEUROMUSCULAR REEDUCATION: CPT

## 2024-10-29 RX ADMIN — VALSARTAN 80 MG: 80 TABLET, FILM COATED ORAL at 21:04

## 2024-10-29 RX ADMIN — Medication 5 MG: at 21:06

## 2024-10-29 RX ADMIN — AMLODIPINE BESYLATE 10 MG: 5 TABLET ORAL at 07:53

## 2024-10-29 RX ADMIN — HEPARIN SODIUM 5000 UNITS: 5000 INJECTION INTRAVENOUS; SUBCUTANEOUS at 05:40

## 2024-10-29 RX ADMIN — METFORMIN HYDROCHLORIDE 500 MG: 500 TABLET ORAL at 07:53

## 2024-10-29 RX ADMIN — ATORVASTATIN CALCIUM 40 MG: 40 TABLET, FILM COATED ORAL at 21:09

## 2024-10-29 RX ADMIN — CLOPIDOGREL BISULFATE 75 MG: 75 TABLET ORAL at 07:52

## 2024-10-29 RX ADMIN — HEPARIN SODIUM 5000 UNITS: 5000 INJECTION INTRAVENOUS; SUBCUTANEOUS at 14:26

## 2024-10-29 RX ADMIN — SENNOSIDES AND DOCUSATE SODIUM 1 TABLET: 50; 8.6 TABLET ORAL at 21:04

## 2024-10-29 RX ADMIN — ASPIRIN 325 MG: 325 TABLET, COATED ORAL at 07:53

## 2024-10-29 RX ADMIN — VALSARTAN 80 MG: 80 TABLET, FILM COATED ORAL at 07:53

## 2024-10-29 RX ADMIN — METFORMIN HYDROCHLORIDE 500 MG: 500 TABLET ORAL at 16:28

## 2024-10-29 RX ADMIN — HEPARIN SODIUM 5000 UNITS: 5000 INJECTION INTRAVENOUS; SUBCUTANEOUS at 21:09

## 2024-10-29 RX ADMIN — ACETAMINOPHEN 650 MG: 325 TABLET ORAL at 21:06

## 2024-10-29 ASSESSMENT — PAIN SCALES - GENERAL
PAINLEVEL_OUTOF10: 0
PAINLEVEL_OUTOF10: 0
PAINLEVEL_OUTOF10: 5
PAINLEVEL_OUTOF10: 0
PAINLEVEL_OUTOF10: 0

## 2024-10-29 ASSESSMENT — PAIN DESCRIPTION - ORIENTATION: ORIENTATION: OTHER (COMMENT)

## 2024-10-29 ASSESSMENT — PAIN DESCRIPTION - DESCRIPTORS: DESCRIPTORS: ACHING

## 2024-10-29 ASSESSMENT — PAIN - FUNCTIONAL ASSESSMENT: PAIN_FUNCTIONAL_ASSESSMENT: ACTIVITIES ARE NOT PREVENTED

## 2024-10-29 ASSESSMENT — PAIN DESCRIPTION - LOCATION: LOCATION: GENERALIZED

## 2024-10-29 NOTE — PROGRESS NOTES
Occupational Therapy  Facility/Department: [facility]  Rehabilitation Occupational Therapy Daily Treatment Note    Date: 10/29/24  Patient Name: Akbar Arias       Room: 0152/0152-01  MRN: [id number]  Account: [id number]   : 1935  (88 y.o.) Gender: male                    Past Medical History:  has a past medical history of Hyperlipidemia and Hypertension.  Past Surgical History:   has a past surgical history that includes back surgery (); eye surgery (Right, 2013); and eye surgery (Left, 13).    Restrictions  Restrictions/Precautions: Fall Risk, General Precautions  Other position/activity restrictions: up with assistance, poor L/R distinction    Subjective  Subjective: Pt in bed, upset about having to get up so early for therapy, only wanting to sleep, no pain, agreeable to OT session with encouragement, /68, HR 51, O2 sats 98% on RA.  Restrictions/Precautions: Fall Risk;General Precautions             Objective     Cognition  Overall Cognitive Status: Exceptions  Arousal/Alertness: Appears intact  Following Commands: Follows one step commands with repetition  Attention Span: Attends with cues to redirect;Difficulty dividing attention;Difficulty attending to directions  Memory: Decreased recall of recent events;Decreased short term memory;Decreased recall of biographical Information  Safety Judgement: Decreased awareness of need for assistance;Decreased awareness of need for safety  Problem Solving: Assistance required to correct errors made;Assistance required to identify errors made;Assistance required to implement solutions;Assistance required to generate solutions;Decreased awareness of errors  Insights: Decreased awareness of deficits  Initiation: Requires cues for all  Sequencing: Requires cues for some  Orientation  Overall Orientation Status: Impaired  Orientation Level: Oriented to place;Disoriented to time;Disoriented to situation;Oriented to person         ADL  Putting  keep velcro on board.     Assessment  Assessment  Assessment: Pt agreeable to OT session. Pt demonstrated improved standing tolerance to stand for up to 7 minutes for mobility around gym. Pt demonstrated poor use of RUE without cues and constraining of LUE. Pt performed FMC tasks with RUE with poor coordination and [place] assist for 25% of tasks and mod/max VCs for proper RUE incorporation. Pt performed [place] test with poor visual scanning for L side initially and good in middle, but then fair on R side. Pt educated on need to attend to peripheries in visual scanning and especially R side during mobility. Pt verbalized understanding of need to attend to R side in environment. Pt completed mobility with fair tolerance and occasional LOB when changing direction, requiring min A. Pt continues to be mildly impulsive with transfers and movements. Continue POC.  Activity Tolerance: Patient tolerated treatment well  Discharge Recommendations: 24 hour supervision or assist;Outpatient OT  OT Equipment Recommendations  Equipment Needed: No  Safety Devices  Safety Devices in place: Yes  Type of devices: Gait belt;Call light within reach;Chair alarm in place;Left in chair;Nurse notified    Patient Education  Education  Education Given To: Patient  Education Provided: Role of Therapy;ADL Function;Precautions;Safety;Transfer Training;Equipment;Mobility Training;Cognition  Education Provided Comments: forced use of RUE, safety with transfers, visual scanning, inattention with visual scanning (to R side in particular)  Education Method: Demonstration;Verbal  Barriers to Learning: Cognition  Education Outcome: Verbalized understanding;Continued education needed    Plan  Occupational Therapy Plan  Times Per Week: 5-7x  Current Treatment Recommendations: Balance training;Functional mobility training;Endurance training;Strengthening;Self-Care / ADL;Safety education & training;Equipment evaluation, education, &

## 2024-10-29 NOTE — PLAN OF CARE
Patient remains free from falls and injuries. Call light within reach.  Patient has been compliant with asking staff to help with transfers.  Patient has nonskid footwear on. Aditya Almeida RN

## 2024-10-29 NOTE — PROGRESS NOTES
[facility]: ACUTE REHAB UNIT  SPEECH-LANGUAGE PATHOLOGY      [x] Daily  [] Weekly Care Conference Note  [] Discharge    Patient:Akbar Arias      :1935  MRN:[id number]  Rehab Dx/Hx: Acute CVA (cerebrovascular accident) (HCC) [I63.9]    Precautions: falls  Home situation: lives with wife. Indep with med management. Wife manages finances.  ST Dx: [] Aphasia  [] Dysarthria  [] Apraxia   [] Oropharyngeal dysphagia [x] Cognitive Impairment  [] Other:   Date of Admit: 10/27/2024  Room #: 0152/0152-01    Current functional status (updated daily):         Pt being seen for : [] Speech/Language Treatment  [] Dysphagia Treatment [x] Cognitive Treatment  [] Other:  Communication: []WFL  [] Aphasia  [] Dysarthria  [] Apraxia  [] Pragmatic Impairment [] Non-verbal  [] Hearing Loss  [x] Other: min expressive / receptive language deficits   Cognition: [] WFL  [] Mild  [x] Moderate  [x] Severe [] Unable to Assess  [] Other:  Memory: [] WFL  [] Mild  [x] Moderate  [x] Severe [] Unable to Assess  [] Other:  Behavior: [x] Alert  [x] Cooperative  [x]  Pleasant  [] Confused  [] Agitated  [] Uncooperative  [] Distractible [] Motivated  [x] Self-Limiting [] Anxious  [] Other:  Endurance:  [x] Adequate for participation in SLP sessions  [] Reduced overall  [] Lethargic  [] Other:  Safety: [] No concerns at this time  [x] Reduced insight into deficits  [x]  Reduced safety awareness [] Not following call light procedures  [] Unable to Assess  [] Other:    Current Diet Order:ADULT ORAL NUTRITION SUPPLEMENT; Breakfast; Diabetic Oral Supplement  ADULT DIET; Regular; 4 carb choices (60 gm/meal)  Swallowing Precautions: general aspiration precautions         Date: 10/29/2024      Tx session 1  1300 - 1400 Tx session 2  All tx needs met in session 1   Total Timed Code Min 60 0   Total Treatment Minutes 60 0   Individual Treatment Minutes 60 0   Group Treatment Minutes 0 0   Co-Treat Minutes 0 0   Variance/Reason:  N/A    Pain Denies     Pain  Pt's wife reports that pt would typically be able to name players on the [place] basketball team. SLP to attempt to plan tx tasks re: pt's interest of these sports in upcoming sessions.      Education:   SLP edu pt and wife re: rationale of tx tasks, recall strategies, thought org strategies     Safety Devices: [x] Call light within reach  [x] Chair alarm activated  [] Bed alarm activated  [x] Other: wife at bedside  [] Call light within reach  [] Chair alarm activated  [] Bed alarm activated  [] Other:    Assessment: Pt alert and cooperative, somewhat agitated but overall participated well in tx tasks. Pt reports being indep with med management PTA, and is agreeable to trial a pill organizer. Pt required mod-max cues to increase acc with a word progression recall task. Pt did well with simple category members task - more difficulty with divergent naming re: topic of sports (pt's interest). Pt's wife reports pt is having most difficulty with rights and lefts, numbers. Continue goals above.    Plan: Continue as per plan of care.      Additional Information:     Barriers toward progress: Cognitive deficit, Impulsivity, Limited safety awareness, Decreased motivation, and Limited insight into deficits  Discharge recommendations:  [] Home independently  [] Home with assistance [x]  24 hour supervision  [] ECF [x] Other: see PT/OT recs  Continued Tx Upon Discharge: ? [x] Yes [] No [] TBD based on progress while on ARU [] Vital Stim indicated [] Other:   Estimated discharge date: TBD    Interventions used this date:  [] Speech/Language Treatment  [] Instruction in HEP [] Group [] Dysphagia Treatment [] Cognitive Treatment   [] Other:      Total Time Breakdown / Charges    Time in Time out Total Time / units   Cognitive Tx 1300 1400 60 min / 4 units    Speech Tx      Dysphagia Tx          Electronically Signed by     Gogo Benitez MA CCC-SLP #[id number]  Speech Language Pathologist

## 2024-10-29 NOTE — PATIENT CARE CONFERENCE
[facility]  Inpatient Rehabilitation  Weekly Team Conference Note    Date: 10/30/2024  Patient Name: Akbar Arias        MRN: [id number]    : 1935  (88 y.o.)  Gender: male   Referring Practitioner: Dr. Fraire  Diagnosis: CVA      Interventions to be utilized toward barriers to discharge, per discipline:  NURSING  Nursing observed barriers to dc: Upper extremity weakness and Lower extremity weakness  Nursing interventions: Medication education and assist with ADLs  Family Education: No  Fall Risk:  Yes    Stay with me?: Yes    PHYSICAL THERAPY  Physical therapy observed barriers to dc:    Baseline: lives with wife in 2 level home with 1-2 ALIA. Ind without AD   Current level: SBA bed mobility, CGA functional transfers, CGA-min A gait without AD x 200 ft , CGA for gait with RW, CGA x 6 steps with BHR   Barriers to DC: communication, decreased insight/STM, R inattention   Needs in order to achieve dc home/next level of care: supv-mod ind with mobility, home with initial 24/7 and OPPT. DME: TBD      Physical therapy interventions:   Current Treatment Recommendations: Strengthening, Balance training, ROM, Functional mobility training, Transfer training, Cognitive/Perceptual training, Endurance training, Gait training, Stair training, Neuromuscular re-education, Cognitive reorientation, Home exercise program, Safety education & training, Patient/Caregiver education & training, Equipment evaluation, education, & procurement, Positioning, Therapeutic activities    PT Goals:            Short Term Goals  Time Frame for Short Term Goals: 1 week 2024  Short Term Goal 1: Pt will complete bed mobility with IND  Short Term Goal 2: Pt will complete functional t/f with LRAD with SBA  Short Term Goal 3: Pt will ambulate >150' with LRAD with SBA  Short Term Goal 4: Pt will ascend/descend 12 steps with HR with SBA            Long Term Goals  Time Frame for Long Term Goals : 2 weeks  changing direction, requiring min A. Pt continues to be mildly impulsive with transfers and movements. Continue POC.  Activity Tolerance: Patient tolerated treatment well  Discharge Recommendations: 24 hour supervision or assist;Outpatient OT  OT Equipment Recommendations  Equipment Needed: No         SPEECH THERAPY:  Speech therapy observed barriers to dc:    Baseline:  lives with wife. Indep with med management. Wife manages finances.    Current level: moderate cognitive deficits,  mild receptive and expressive language deficits    Barriers to DC: reduced insight into deficits, reduced safety    Needs in order to achieve dc home/next level of care: carryover of compensatory strategies, possible 24 hr supervision, assist with med management     Speech Therapy interventions:  Dysphagia:  N/A  Speech/Language/Cognition: Compensatory strategy training and carryover, recall/STM, problem solving, reasoning, exec function, thought organization, attention.       Dysphagia Goals:  N/A    Speech/Language/Cog Goals:  Time Frame for Long Term Goals: 14 Days (11/09/24)  Goal 1: Pt will improve cognitive-linguistic abilities to promote safe and independent return home PLOF - 10/29 - ongoing, progressing         Short Term Goals  Time Frame for Short Term Goals: 10 Days (11/05/24)  Goal 1: Pt will complete graded recall tasks using compensatory strategies with 80% acc given min cues - 10/29 - ongoing, progressing   Goal 2: Pt will complete executive function tasks (e.g. meds, time, money, etc) with 80% acc given min cues - 10/29 - ongoing, progressing   Goal 3: Pt will complete problem solving and thought organization tasks with 80% acc given min cues- 10/29 - ongoing, progressing   Goal 4: Pt will complete verbal and visual reasoning tasks with 80% acc given min cues- 10/29 - ongoing, progressing       ST Assessment:  Pt alert and cooperative, somewhat agitated but overall participated well in tx tasks. Pt reports being indep  with med management PTA, and is agreeable to trial a pill organizer. Pt required mod-max cues to increase acc with a word progression recall task. Pt did well with simple category members task - more difficulty with divergent naming re: topic of sports (pt's interest). Pt's wife reports pt is having most difficulty with rights and lefts, numbers. Continue goals above.      NUTRITION  Weight - Scale: 87.2 kg (192 lb 3.9 oz) / Body mass index is 26.07 kg/m².  Diet Order: ADULT ORAL NUTRITION SUPPLEMENT; Breakfast; Diabetic Oral Supplement  ADULT DIET; Regular; 4 carb choices (60 gm/meal)  PO Meals Eaten (%): 0%  Malnutrition Status: At risk for malnutrition (Comment) (d/t unintentional weight loss and predicted suboptimal intakes)  Nutrition Education/Counseling: No recommendation at this time      CASE MANAGEMENT  Assessment: Lives with wife, son, and grandson in two-story house. Was active  prior to CVA. Wife or son to provide transportation support at discharge- no barriers. Patient is agreeable to HHC or OP therapy.        Interdisciplinary Goals:   1.) Pt will complete toileting with SPV.  2.) pt will ambulate about unit with LRAD and supv   3.) Pt will carryover use of compensatory strategies for improved recall, safety, and insight across all disciplines given min cues   4.)  5.)      [x]  Family Training discussed at conference and to be scheduled.      Discharge Plan   Estimated discharge date: 11/09/2024  Destination: Home  Pass: No  Services at Discharge: Outpatient Physical Therapy, Occupational Therapy, Speech Therapy, and Nursing   Equipment at Discharge: RW    Team Members Present at Conference:  : Sara Quach RN    Occupational Therapist: Rudolph Delgado OTR/L  Physical Therapist: Luz Edgar PT, DPT   Speech Therapist: Gogo Benitez MA CCC-SLP  Nurse: Hyun Roldan RN  Dietician: Oriana Page RDN, LD  Scribe: Helene Jones PT, DPT NCS  : Unruly Alejandro

## 2024-10-29 NOTE — PROGRESS NOTES
Physical Therapy  Facility/Department: [facility]  Rehabilitation Physical Therapy Treatment Note    NAME: Akbar Arias  : 1935 (88 y.o.)  MRN: [id number]  CODE STATUS: Full Code    Date of Service: 10/29/24       Restrictions:  Restrictions/Precautions: Fall Risk, General Precautions  Position Activity Restriction  Other position/activity restrictions: up with assistance, poor L/R distinction     SUBJECTIVE  Subjective  Subjective: pt found in recliner, very frustrated at having been woken up at 830 for OT. therapist provided therapuetic listneing, encouraged pt to participate as able  Pain: denies pain       OBJECTIVE  Cognition  Overall Cognitive Status: Exceptions  Arousal/Alertness: Appears intact  Following Commands: Follows one step commands with repetition  Attention Span: Attends with cues to redirect;Difficulty dividing attention;Difficulty attending to directions  Memory: Decreased recall of recent events;Decreased short term memory;Decreased recall of biographical Information  Safety Judgement: Decreased awareness of need for assistance;Decreased awareness of need for safety  Problem Solving: Assistance required to correct errors made;Assistance required to identify errors made;Assistance required to implement solutions;Assistance required to generate solutions;Decreased awareness of errors  Insights: Decreased awareness of deficits  Initiation: Requires cues for all  Sequencing: Requires cues for some  Orientation  Overall Orientation Status: Impaired  Orientation Level: Oriented to place;Disoriented to time;Disoriented to situation;Oriented to person    Functional Mobility  Sit to Stand  Assistance Level: Stand by assist;Contact guard assist  Skilled Clinical Factors: cues for safe hand placement  Stand to Sit  Assistance Level: Stand by assist      Environmental Mobility  Ambulation  Surface: Level surface  Device: Rolling walker  Distance: 180 ftx 2 reps   Assistance Level: Contact guard  Therapy;Mobility Training;Plan of Care;Precautions;Safety;Transfer Training  Education Provided Comments: Educated on role of PT, goals and safe progression of mobility. Educated on schedule and care conferences on ARU.  Education Method: Demonstration;Verbal  Barriers to Learning: Cognition  Education Outcome: Verbalized understanding;Continued education needed        Therapy Time   Individual Concurrent Group Co-treatment   Time In 1000         Time Out 1100         Minutes 60           Timed Code Treatment Minutes: 60 Minutes       Luz Edgar PT, 10/29/24 at 10:49 AM

## 2024-10-29 NOTE — PLAN OF CARE
Pt remains free from falls at this time. Fall precautions in place including: bed alarm, orange blanket, orange armband, bed in lowest position, wheels locked, and SAFE sign outside pts door. Pt instructed to call out with any needs, verbalized understanding. No additional needs at this time, call light within reach.

## 2024-10-29 NOTE — PROGRESS NOTES
Akbar Arias  10/29/2024  [id number]    Chief Complaint: Acute CVA (cerebrovascular accident) (HCC)    Subjective:   No acute events overnight. Today Ed is seen with his wife present. He reports difficulty waking up early. Discussed with . He also reports impaired dexterity in his hands. Discussed with nursing having assistance with opening meal/drink items. He otherwise denies acute complaints at this time.     Personally reviewed labs.     ROS: denies f/c, n/v, cp, sob    Objective:  Patient Vitals for the past 24 hrs:   BP Temp Temp src Pulse Resp SpO2   10/29/24 1014 (!) 129/54 -- -- 66 -- 96 %   10/29/24 0835 (!) 149/68 -- -- 51 -- 98 %   10/29/24 0730 131/64 97.3 °F (36.3 °C) Oral (!) 49 16 98 %   10/28/24 2112 -- 97.6 °F (36.4 °C) Oral 60 16 98 %     Gen: No distress, pleasant.   HEENT: Normocephalic, atraumatic.   CV: extremities well perfused  Resp: No respiratory distress. On room air  Abd: Soft, nontender   Ext: No edema.   Neuro: Alert, oriented, appropriately interactive.     Wt Readings from Last 3 Encounters:   10/28/24 87.2 kg (192 lb 3.9 oz)   06/04/18 108.4 kg (239 lb)   01/22/18 111.6 kg (246 lb)       Laboratory data:   Lab Results   Component Value Date    WBC 6.3 10/28/2024    HGB 11.6 (L) 10/28/2024    HCT 35.0 (L) 10/28/2024    MCV 95.4 10/28/2024     10/28/2024       Lab Results   Component Value Date/Time     10/28/2024 05:48 AM    K 4.2 10/28/2024 05:48 AM     10/28/2024 05:48 AM    CO2 25 10/28/2024 05:48 AM    BUN 28 10/28/2024 05:48 AM    CREATININE 1.5 10/28/2024 05:48 AM    GLUCOSE 122 10/28/2024 05:48 AM    CALCIUM 9.6 10/28/2024 05:48 AM        Therapy progress:  Physical therapy:  Bed Mobility:     Sit>supine:     Supine>sit:     Transfers:     Sit>stand:  Assistance Level: Stand by assist, Contact guard assist  Skilled Clinical Factors: cues for safe hand placement  Stand>sit:  Assistance Level: Stand by assist  Bed<>chair     Stand Pivot:

## 2024-10-30 LAB
ANION GAP SERPL CALCULATED.3IONS-SCNC: 10 MMOL/L (ref 3–16)
BASOPHILS # BLD: 0.1 K/UL (ref 0–0.2)
BASOPHILS NFR BLD: 1.2 %
BUN SERPL-MCNC: 33 MG/DL (ref 7–20)
CALCIUM SERPL-MCNC: 9.2 MG/DL (ref 8.3–10.6)
CHLORIDE SERPL-SCNC: 108 MMOL/L (ref 99–110)
CO2 SERPL-SCNC: 21 MMOL/L (ref 21–32)
CREAT SERPL-MCNC: 1.6 MG/DL (ref 0.8–1.3)
DEPRECATED RDW RBC AUTO: 13.7 % (ref 12.4–15.4)
EKG ATRIAL RATE: 67 BPM
EKG DIAGNOSIS: NORMAL
EKG P AXIS: 38 DEGREES
EKG P-R INTERVAL: 214 MS
EKG Q-T INTERVAL: 440 MS
EKG QRS DURATION: 122 MS
EKG QTC CALCULATION (BAZETT): 464 MS
EKG R AXIS: -18 DEGREES
EKG T AXIS: 26 DEGREES
EKG VENTRICULAR RATE: 67 BPM
EOSINOPHIL # BLD: 0.2 K/UL (ref 0–0.6)
EOSINOPHIL NFR BLD: 3.3 %
GFR SERPLBLD CREATININE-BSD FMLA CKD-EPI: 41 ML/MIN/{1.73_M2}
GLUCOSE BLD-MCNC: 117 MG/DL (ref 70–99)
GLUCOSE BLD-MCNC: 120 MG/DL (ref 70–99)
GLUCOSE BLD-MCNC: 160 MG/DL (ref 70–99)
GLUCOSE BLD-MCNC: 99 MG/DL (ref 70–99)
GLUCOSE SERPL-MCNC: 115 MG/DL (ref 70–99)
HCT VFR BLD AUTO: 32 % (ref 40.5–52.5)
HGB BLD-MCNC: 10.7 G/DL (ref 13.5–17.5)
LYMPHOCYTES # BLD: 1.1 K/UL (ref 1–5.1)
LYMPHOCYTES NFR BLD: 19.8 %
MCH RBC QN AUTO: 31.8 PG (ref 26–34)
MCHC RBC AUTO-ENTMCNC: 33.5 G/DL (ref 31–36)
MCV RBC AUTO: 95 FL (ref 80–100)
MONOCYTES # BLD: 0.7 K/UL (ref 0–1.3)
MONOCYTES NFR BLD: 12.4 %
NEUTROPHILS # BLD: 3.6 K/UL (ref 1.7–7.7)
NEUTROPHILS NFR BLD: 63.3 %
PERFORMED ON: ABNORMAL
PERFORMED ON: NORMAL
PLATELET # BLD AUTO: 264 K/UL (ref 135–450)
PMV BLD AUTO: 9.4 FL (ref 5–10.5)
POTASSIUM SERPL-SCNC: 4.1 MMOL/L (ref 3.5–5.1)
RBC # BLD AUTO: 3.36 M/UL (ref 4.2–5.9)
SODIUM SERPL-SCNC: 139 MMOL/L (ref 136–145)
WBC # BLD AUTO: 5.7 K/UL (ref 4–11)

## 2024-10-30 PROCEDURE — 36415 COLL VENOUS BLD VENIPUNCTURE: CPT

## 2024-10-30 PROCEDURE — 97116 GAIT TRAINING THERAPY: CPT

## 2024-10-30 PROCEDURE — 1280000000 HC REHAB R&B

## 2024-10-30 PROCEDURE — 97130 THER IVNTJ EA ADDL 15 MIN: CPT

## 2024-10-30 PROCEDURE — 85025 COMPLETE CBC W/AUTO DIFF WBC: CPT

## 2024-10-30 PROCEDURE — 80048 BASIC METABOLIC PNL TOTAL CA: CPT

## 2024-10-30 PROCEDURE — 6370000000 HC RX 637 (ALT 250 FOR IP): Performed by: STUDENT IN AN ORGANIZED HEALTH CARE EDUCATION/TRAINING PROGRAM

## 2024-10-30 PROCEDURE — 93005 ELECTROCARDIOGRAM TRACING: CPT | Performed by: STUDENT IN AN ORGANIZED HEALTH CARE EDUCATION/TRAINING PROGRAM

## 2024-10-30 PROCEDURE — 97112 NEUROMUSCULAR REEDUCATION: CPT

## 2024-10-30 PROCEDURE — 97530 THERAPEUTIC ACTIVITIES: CPT

## 2024-10-30 PROCEDURE — 97110 THERAPEUTIC EXERCISES: CPT

## 2024-10-30 PROCEDURE — 93010 ELECTROCARDIOGRAM REPORT: CPT | Performed by: INTERNAL MEDICINE

## 2024-10-30 PROCEDURE — 97129 THER IVNTJ 1ST 15 MIN: CPT

## 2024-10-30 PROCEDURE — 1200000000 HC SEMI PRIVATE

## 2024-10-30 PROCEDURE — 6360000002 HC RX W HCPCS: Performed by: STUDENT IN AN ORGANIZED HEALTH CARE EDUCATION/TRAINING PROGRAM

## 2024-10-30 RX ORDER — SENNA AND DOCUSATE SODIUM 50; 8.6 MG/1; MG/1
1 TABLET, FILM COATED ORAL 2 TIMES DAILY
Status: DISCONTINUED | OUTPATIENT
Start: 2024-10-30 | End: 2024-11-09 | Stop reason: HOSPADM

## 2024-10-30 RX ADMIN — VALSARTAN 80 MG: 80 TABLET, FILM COATED ORAL at 21:55

## 2024-10-30 RX ADMIN — VALSARTAN 80 MG: 80 TABLET, FILM COATED ORAL at 09:29

## 2024-10-30 RX ADMIN — AMLODIPINE BESYLATE 10 MG: 5 TABLET ORAL at 09:29

## 2024-10-30 RX ADMIN — HEPARIN SODIUM 5000 UNITS: 5000 INJECTION INTRAVENOUS; SUBCUTANEOUS at 14:37

## 2024-10-30 RX ADMIN — SENNOSIDES AND DOCUSATE SODIUM 1 TABLET: 50; 8.6 TABLET ORAL at 14:37

## 2024-10-30 RX ADMIN — ASPIRIN 325 MG: 325 TABLET, COATED ORAL at 09:29

## 2024-10-30 RX ADMIN — METFORMIN HYDROCHLORIDE 500 MG: 500 TABLET ORAL at 17:30

## 2024-10-30 RX ADMIN — SENNOSIDES AND DOCUSATE SODIUM 1 TABLET: 50; 8.6 TABLET ORAL at 21:55

## 2024-10-30 RX ADMIN — METFORMIN HYDROCHLORIDE 500 MG: 500 TABLET ORAL at 09:29

## 2024-10-30 RX ADMIN — Medication 5 MG: at 21:55

## 2024-10-30 RX ADMIN — CLOPIDOGREL BISULFATE 75 MG: 75 TABLET ORAL at 09:29

## 2024-10-30 RX ADMIN — ATORVASTATIN CALCIUM 40 MG: 40 TABLET, FILM COATED ORAL at 21:55

## 2024-10-30 RX ADMIN — POLYETHYLENE GLYCOL 3350 17 G: 17 POWDER, FOR SOLUTION ORAL at 09:34

## 2024-10-30 RX ADMIN — HEPARIN SODIUM 5000 UNITS: 5000 INJECTION INTRAVENOUS; SUBCUTANEOUS at 21:56

## 2024-10-30 RX ADMIN — HEPARIN SODIUM 5000 UNITS: 5000 INJECTION INTRAVENOUS; SUBCUTANEOUS at 06:53

## 2024-10-30 ASSESSMENT — PAIN SCALES - GENERAL: PAINLEVEL_OUTOF10: 0

## 2024-10-30 NOTE — CARE COORDINATION
CM update: Met with wife Marielos in patient room to discuss discharge planning. Patient is currently working with therapy. Wife Marielos aware of discharge planned for Saturday 11/9. Wife is agreeable to OP Therapy and would like to do therapy at the [facility]- Dr. Fraire made aware. Wife reports that patient has a no-wheel standard walker and Rollator. Wife made aware that therapy is recommending a 2-wheel RW and we will plan to provide at discharge. Discussed Family Training with wife and she has scheduled for Friday 11/8 from 1:30-2:30pm. No further questions/concerns. Will continue to follow.    Sara Quach RN

## 2024-10-30 NOTE — PROGRESS NOTES
[facility]: ACUTE REHAB UNIT  SPEECH-LANGUAGE PATHOLOGY      [x] Daily  [] Weekly Care Conference Note  [] Discharge    Patient:Akbar Arias      :1935  MRN:[id number]  Rehab Dx/Hx: Acute CVA (cerebrovascular accident) (HCC) [I63.9]    Precautions: falls  Home situation: lives with wife. Indep with med management. Wife manages finances.  ST Dx: [] Aphasia  [] Dysarthria  [] Apraxia   [] Oropharyngeal dysphagia [x] Cognitive Impairment  [] Other:   Date of Admit: 10/27/2024  Room #: 0152/0152-01    Current functional status (updated daily):         Pt being seen for : [] Speech/Language Treatment  [] Dysphagia Treatment [x] Cognitive Treatment  [] Other:  Communication: []WFL  [] Aphasia  [] Dysarthria  [] Apraxia  [] Pragmatic Impairment [] Non-verbal  [] Hearing Loss  [x] Other: min expressive / receptive language deficits   Cognition: [] WFL  [] Mild  [x] Moderate  [x] Severe [] Unable to Assess  [] Other:  Memory: [] WFL  [] Mild  [x] Moderate  [x] Severe [] Unable to Assess  [] Other:  Behavior: [x] Alert  [x] Cooperative  [x]  Pleasant  [] Confused  [] Agitated  [] Uncooperative  [] Distractible [] Motivated  [x] Self-Limiting [] Anxious  [] Other:  Endurance:  [x] Adequate for participation in SLP sessions  [] Reduced overall  [] Lethargic  [] Other:  Safety: [] No concerns at this time  [x] Reduced insight into deficits  [x]  Reduced safety awareness [] Not following call light procedures  [] Unable to Assess  [] Other:    Current Diet Order:ADULT ORAL NUTRITION SUPPLEMENT; Breakfast; Diabetic Oral Supplement  ADULT DIET; Regular; 4 carb choices (60 gm/meal)  Swallowing Precautions: general aspiration precautions         Date: 10/30/2024      Tx session 1  [id number] Tx session 2  All tx needs met in session 1   Total Timed Code Min 60 0   Total Treatment Minutes 60 0   Individual Treatment Minutes 60 0   Group Treatment Minutes 0 0   Co-Treat Minutes 0 0   Variance/Reason:  N/A    Pain Denies     Pain  Treatment   [] Other:      Total Time Breakdown / Charges    Time in Time out Total Time / units   Cognitive Tx 1230 1330 60 min / 4 units    Speech Tx      Dysphagia Tx          Electronically Signed by     Rashida Dhillon M.A. CCC-SLP   Speech-Language Pathologist

## 2024-10-30 NOTE — PROGRESS NOTES
Physical Therapy  Therapist attempted to complete follow up this pm from [id number]. Upon obtaining vitals, HR found to be 35-38 bpm, increasing to mid 40s however returns to mid 30s. RN notified and assessed pt , also found pt HR to be low. Notified MD and MD ordered EKG, pt to be medical hold until cleared medically. Will resume when able.     Therapist assisted pt from recliner to bed with CGA, sit to supine with supv-mod ind  Pt in bed with RN attending to him.     Luz Edgar PT, DPT  Second Session Therapy Time:   Individual Concurrent Group Co-treatment   Time In 1330         Time Out 1340         Minutes 10           Timed Code Treatment Minutes:  10    Total Treatment Minutes:  10  Minute variance: 50 min (med hold)     Pt unresponsive/nonverbal and is currently comfort care

## 2024-10-30 NOTE — CARE COORDINATION
Weekly team care conference with interdisciplinary team on: 10/30/24    Chart reviewed for length of stay. IP day # 3   Unit:  ARU  Diagnosis and current status as per MD progress: Acute CVA  Consultants Following: Physical Medicine  Planned Discharge Date: 11/9/24  Durable medical equipment needed: RW  Discharge Plan: Home w/ 24hr and OP Therapy    Sara Quach RN

## 2024-10-30 NOTE — PROGRESS NOTES
Occupational Therapy  Facility/Department: [facility]  Rehabilitation Occupational Therapy Daily Treatment Note    Date: 10/30/24  Patient Name: Akbar Arias       Room: 0152/0152-01  MRN: [id number]  Account: [id number]   : 1935  (88 y.o.) Gender: male                    Past Medical History:  has a past medical history of Hyperlipidemia and Hypertension.  Past Surgical History:   has a past surgical history that includes back surgery (); eye surgery (Right, 2013); and eye surgery (Left, 13).    Restrictions  Restrictions/Precautions: Fall Risk, General Precautions  Other position/activity restrictions: up with assistance, poor L/R distinction    Subjective  Subjective: Pt sitting on EOB, no pain, declined all ADLs, agreeable to OT session, /65, , O2 sats 98% on RA.  Restrictions/Precautions: Fall Risk;General Precautions             Objective     Cognition  Overall Cognitive Status: Exceptions  Arousal/Alertness: Appears intact  Following Commands: Follows one step commands with repetition  Attention Span: Attends with cues to redirect;Difficulty dividing attention;Difficulty attending to directions  Memory: Decreased recall of recent events;Decreased short term memory;Decreased recall of biographical Information  Safety Judgement: Decreased awareness of need for assistance;Decreased awareness of need for safety  Problem Solving: Assistance required to correct errors made;Assistance required to identify errors made;Assistance required to implement solutions;Assistance required to generate solutions;Decreased awareness of errors  Insights: Decreased awareness of deficits  Initiation: Requires cues for all  Sequencing: Requires cues for some  Orientation  Overall Orientation Status: Impaired  Orientation Level: Oriented to place;Disoriented to time;Disoriented to situation;Oriented to person         ADL  Lower Extremity Dressing  Assistance Level: Moderate assistance  Skilled  Clinical Factors: assist to thread RLE into shorts, SBA to stand and manage over hips          Functional Mobility  Activity: To/From therapy gym;Retrieve items;Transport items  Assistance Level: Maximum assistance  Skilled Clinical Factors: CGA/min A for majority of session, mod/max A to correct 2-3 LOB, standing for 10 minutes and 12 minutes for dynamic standing balance task  Transfers  Surface: From bed;To chair with arms;From chair with arms  Additional Factors: Set-up;With handrails  Sit to Stand  Assistance Level: Stand by assist  Stand to Sit  Assistance Level: Stand by assist  Bed To/From Chair  Technique: Stand step  Assistance Level: Minimal assistance   Neuromuscular Education  Neuromuscular education: Yes  Facilitation techniques: Pt required constraining of LUE during FMC task to remove coins from stereognosis bin. Pt able to remove 5 coins out of ~30 in bin with RUE. Assist to locate additional 10 coins and able to remove with RUE. Pt required cues to use RUE to flip coins over and place in bin with BUEs simultaneously and improving bimanual movements as task progressed but still fair to poor coordination with RUE.  Response to Techniques: Pt required max VCs for sequencing through alternating number/letter cards with 1 step cues for locating and selecting proper card for all letters and numbers A-M and 1-13. Pt able to select proper letter/number with cues for all except unable to locate letter \"H\". Pt demonstrated good coordination with LUE for grasping cards.     Assessment  Assessment  Assessment: Pt agreeable to OT session. Pt demonstrated good standing tolerance to stand for 10-12 minutes x2 for mobility around gym to avoid obstacles and collect cards in alternating number/letter order. Pt performed task with max VCs for sequencing and difficulty with STM to keep track of order. Pt required up to mod/max A to correct major LOB 2-3 times when tripping over obstables on floor. Pt required CGA for  majority of task. Pt completed FMC to manipulate coins and remove from stereognosis bin with constraining of LUE to force use of RUE and poor searching through bin to locate coins with 25% accuracy/thoroughness. Pt educated on scanning on R side and improved R scanning after cueing but still required mod VCs. Pt required min VCs for locating room when way finding back to room at end of session. Continue POC.  Activity Tolerance: Patient tolerated treatment well  Discharge Recommendations: 24 hour supervision or assist;Outpatient OT  OT Equipment Recommendations  Equipment Needed: No  Safety Devices  Safety Devices in place: Yes  Type of devices: Gait belt;Call light within reach;Chair alarm in place;Left in chair;Nurse notified    Patient Education  Education  Education Given To: Patient  Education Provided: Role of Therapy;ADL Function;Precautions;Safety;Transfer Training;Equipment;Mobility Training;Cognition  Education Provided Comments: forced use of RUE, safety with transfers, visual scanning, inattention with visual scanning (to R side in particular), dual tasking and sequencing, obstacle avoidance and balance  Education Method: Demonstration;Verbal  Barriers to Learning: Cognition  Education Outcome: Verbalized understanding;Continued education needed    Plan  Occupational Therapy Plan  Times Per Week: 5-7x  Current Treatment Recommendations: Balance training;Functional mobility training;Endurance training;Strengthening;Self-Care / ADL;Safety education & training;Equipment evaluation, education, & procurement;Patient/Caregiver education & training;ROM;Neuromuscular re-education;Coordination training;Cognitive/Perceptual training    Goals  Short Term Goals  Time Frame for Short Term Goals: 6 days- 11/1/24  Short Term Goal 1: Pt will complete toileting with SPV.  Short Term Goal 2: Pt will perform full body dressing with SPV.  Short Term Goal 3: Pt will complete full body bathing with SBA.  Short Term Goal 4: Pt

## 2024-10-30 NOTE — PROGRESS NOTES
Akbar Arias  10/30/2024  [id number]    Chief Complaint: Acute CVA (cerebrovascular accident) (HCC)    Subjective:   No acute events overnight. Today Ed is seen with his wife and granddaughter present. He reports sleeping well last night. He denies any current pain or other acute complaints.     Personally reviewed labs.     ROS: denies f/c, n/v, cp, sob    Objective:  Patient Vitals for the past 24 hrs:   BP Temp Temp src Pulse Resp SpO2   10/30/24 0927 (!) 147/65 97.3 °F (36.3 °C) Axillary 50 16 94 %   10/29/24 2106 -- 97.6 °F (36.4 °C) Oral 74 16 98 %   10/29/24 2104 (!) 142/55 -- -- -- -- --     Gen: No distress, pleasant.   HEENT: Normocephalic, atraumatic.   CV: extremities well perfused  Resp: No respiratory distress. On room air  Abd: Soft, nontender   Ext: No edema.   Neuro: Alert, oriented, appropriately interactive. Speech fluent  Psych: appropriate mood and affect    Wt Readings from Last 3 Encounters:   10/28/24 87.2 kg (192 lb 3.9 oz)   06/04/18 108.4 kg (239 lb)   01/22/18 111.6 kg (246 lb)       Laboratory data:   Lab Results   Component Value Date    WBC 5.7 10/30/2024    HGB 10.7 (L) 10/30/2024    HCT 32.0 (L) 10/30/2024    MCV 95.0 10/30/2024     10/30/2024       Lab Results   Component Value Date/Time     10/30/2024 05:45 AM    K 4.1 10/30/2024 05:45 AM     10/30/2024 05:45 AM    CO2 21 10/30/2024 05:45 AM    BUN 33 10/30/2024 05:45 AM    CREATININE 1.6 10/30/2024 05:45 AM    GLUCOSE 115 10/30/2024 05:45 AM    CALCIUM 9.2 10/30/2024 05:45 AM        Therapy progress:  Physical therapy:  Bed Mobility:     Sit>supine:     Supine>sit:     Transfers:     Sit>stand:  Assistance Level: Stand by assist, Contact guard assist  Skilled Clinical Factors: cues for safe hand placement  Stand>sit:  Assistance Level: Stand by assist  Bed<>chair     Stand Pivot:     Lateral transfer:     Car transfer:     Ambulation:  Surface: Level surface  Device: Rolling walker  Distance: 180  UC  - consult Nephrology, appreciate assistance  - continue to monitor intermittently     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  IMC if PVR > 200ml or if any volume is > 500 ml.      Sleep: melatonin provided prn.      PPX  DVT: heparin  GI: not indicated     Follow up appointments: PCP    Therapy progress: pending therapies today  Services: OP PT, OT,ST; 24 hour assistance  DME: RW  EDOD: 11/9    Interdisciplinary team conference was held today with entire rehab treatment team including PT, OT, SLP (if applicable), Dietician, RN, and SW. Discussion focused on progress toward rehab goals and discharge planning. Making progress. Barriers include level of assistance, endurance, comorbidities. We as a medical team, and I as the physician , made a plan to work on these barriers to facilitate safe discharge. Plan will be presented to patient/family (if available).       Geri Fraire MD 10/30/2024, 12:56 PM

## 2024-10-30 NOTE — PROGRESS NOTES
Physical Therapy  Facility/Department: [facility]  Rehabilitation Physical Therapy Treatment Note    NAME: Akbar Arias  : 1935 (88 y.o.)  MRN: [id number]  CODE STATUS: Full Code    Date of Service: 10/30/24       Restrictions:  Restrictions/Precautions: Fall Risk, General Precautions  Position Activity Restriction  Other position/activity restrictions: up with assistance, poor L/R distinction     SUBJECTIVE  Subjective  Subjective: Patient agreeable to PT session.  Pain: denies pain        OBJECTIVE  Cognition  Overall Cognitive Status: Exceptions  Arousal/Alertness: Appears intact  Following Commands: Follows one step commands with repetition  Attention Span: Attends with cues to redirect;Difficulty dividing attention;Difficulty attending to directions  Memory: Decreased recall of recent events;Decreased short term memory;Decreased recall of biographical Information  Safety Judgement: Decreased awareness of need for assistance;Decreased awareness of need for safety  Problem Solving: Assistance required to correct errors made;Assistance required to identify errors made;Assistance required to implement solutions;Assistance required to generate solutions;Decreased awareness of errors  Insights: Decreased awareness of deficits  Initiation: Requires cues for all  Sequencing: Requires cues for some  Orientation  Overall Orientation Status: Impaired  Orientation Level: Oriented to place;Disoriented to time;Disoriented to situation;Oriented to person    Functional Mobility  Sit to Supine  Assistance Level: Modified independent  Skilled Clinical Factors: HOB elevated  Supine to Sit  Assistance Level: Modified independent  Skilled Clinical Factors: HOB elevated  Balance  Standing Balance: Contact guard assistance  Standing Balance  Time: 4'  Activity: standing at toilet and hand hygiene  Transfers  Surface: To chair with arms;To chair without arms;From bed  Additional Factors: Verbal cues  Device:  (no AD)  Sit to

## 2024-10-30 NOTE — PLAN OF CARE
Problem: Safety - Adult  Goal: Free from fall injury  10/30/2024 1206 by Hyun Roldan, RN  Outcome: Progressing  10/29/2024 2223 by Melva Butcher, RN  Outcome: Progressing     Problem: Skin/Tissue Integrity - Adult  Goal: Skin integrity remains intact  Outcome: Progressing     Problem: Pain  Goal: Verbalizes/displays adequate comfort level or baseline comfort level  Outcome: Progressing

## 2024-10-31 LAB
GLUCOSE BLD-MCNC: 108 MG/DL (ref 70–99)
GLUCOSE BLD-MCNC: 109 MG/DL (ref 70–99)
GLUCOSE BLD-MCNC: 118 MG/DL (ref 70–99)
GLUCOSE BLD-MCNC: 153 MG/DL (ref 70–99)
PERFORMED ON: ABNORMAL

## 2024-10-31 PROCEDURE — 97112 NEUROMUSCULAR REEDUCATION: CPT

## 2024-10-31 PROCEDURE — 97130 THER IVNTJ EA ADDL 15 MIN: CPT

## 2024-10-31 PROCEDURE — 6360000002 HC RX W HCPCS: Performed by: STUDENT IN AN ORGANIZED HEALTH CARE EDUCATION/TRAINING PROGRAM

## 2024-10-31 PROCEDURE — 97116 GAIT TRAINING THERAPY: CPT

## 2024-10-31 PROCEDURE — 97530 THERAPEUTIC ACTIVITIES: CPT

## 2024-10-31 PROCEDURE — 1280000000 HC REHAB R&B

## 2024-10-31 PROCEDURE — 6370000000 HC RX 637 (ALT 250 FOR IP): Performed by: STUDENT IN AN ORGANIZED HEALTH CARE EDUCATION/TRAINING PROGRAM

## 2024-10-31 PROCEDURE — 97129 THER IVNTJ 1ST 15 MIN: CPT

## 2024-10-31 PROCEDURE — 97110 THERAPEUTIC EXERCISES: CPT

## 2024-10-31 PROCEDURE — 97535 SELF CARE MNGMENT TRAINING: CPT

## 2024-10-31 PROCEDURE — 1200000000 HC SEMI PRIVATE

## 2024-10-31 RX ORDER — HYDRALAZINE HYDROCHLORIDE 10 MG/1
10 TABLET, FILM COATED ORAL EVERY 8 HOURS SCHEDULED
Status: DISCONTINUED | OUTPATIENT
Start: 2024-10-31 | End: 2024-11-04

## 2024-10-31 RX ADMIN — METFORMIN HYDROCHLORIDE 500 MG: 500 TABLET ORAL at 18:19

## 2024-10-31 RX ADMIN — HEPARIN SODIUM 5000 UNITS: 5000 INJECTION INTRAVENOUS; SUBCUTANEOUS at 20:17

## 2024-10-31 RX ADMIN — ATORVASTATIN CALCIUM 40 MG: 40 TABLET, FILM COATED ORAL at 20:15

## 2024-10-31 RX ADMIN — SENNOSIDES AND DOCUSATE SODIUM 1 TABLET: 50; 8.6 TABLET ORAL at 20:15

## 2024-10-31 RX ADMIN — METFORMIN HYDROCHLORIDE 500 MG: 500 TABLET ORAL at 08:44

## 2024-10-31 RX ADMIN — SENNOSIDES AND DOCUSATE SODIUM 1 TABLET: 50; 8.6 TABLET ORAL at 08:44

## 2024-10-31 RX ADMIN — AMLODIPINE BESYLATE 10 MG: 5 TABLET ORAL at 08:44

## 2024-10-31 RX ADMIN — VALSARTAN 80 MG: 80 TABLET, FILM COATED ORAL at 08:44

## 2024-10-31 RX ADMIN — Medication 5 MG: at 20:15

## 2024-10-31 RX ADMIN — HEPARIN SODIUM 5000 UNITS: 5000 INJECTION INTRAVENOUS; SUBCUTANEOUS at 06:26

## 2024-10-31 RX ADMIN — CLOPIDOGREL BISULFATE 75 MG: 75 TABLET ORAL at 08:44

## 2024-10-31 RX ADMIN — POLYETHYLENE GLYCOL 3350 17 G: 17 POWDER, FOR SOLUTION ORAL at 08:45

## 2024-10-31 RX ADMIN — HEPARIN SODIUM 5000 UNITS: 5000 INJECTION INTRAVENOUS; SUBCUTANEOUS at 15:10

## 2024-10-31 RX ADMIN — ASPIRIN 325 MG: 325 TABLET, COATED ORAL at 08:44

## 2024-10-31 NOTE — PROGRESS NOTES
[facility]: ACUTE REHAB UNIT  SPEECH-LANGUAGE PATHOLOGY      [x] Daily  [] Weekly Care Conference Note  [] Discharge    Patient:Akbar Arias      :1935  MRN:[id number]  Rehab Dx/Hx: Acute CVA (cerebrovascular accident) (HCC) [I63.9]    Precautions: falls  Home situation: lives with wife. Indep with med management. Wife manages finances.  ST Dx: [] Aphasia  [] Dysarthria  [] Apraxia   [] Oropharyngeal dysphagia [x] Cognitive Impairment  [] Other:   Date of Admit: 10/27/2024  Room #: 0152/0152-01    Current functional status (updated daily):         Pt being seen for : [] Speech/Language Treatment  [] Dysphagia Treatment [x] Cognitive Treatment  [] Other:  Communication: []WFL  [] Aphasia  [] Dysarthria  [] Apraxia  [] Pragmatic Impairment [] Non-verbal  [] Hearing Loss  [x] Other: min expressive / receptive language deficits   Cognition: [] WFL  [] Mild  [x] Moderate  [x] Severe [] Unable to Assess  [] Other:  Memory: [] WFL  [] Mild  [x] Moderate  [x] Severe [] Unable to Assess  [] Other:  Behavior: [x] Alert  [x] Cooperative  [x]  Pleasant  [] Confused  [] Agitated  [] Uncooperative  [] Distractible [] Motivated  [x] Self-Limiting [] Anxious  [] Other:  Endurance:  [x] Adequate for participation in SLP sessions  [] Reduced overall  [] Lethargic  [] Other:  Safety: [] No concerns at this time  [x] Reduced insight into deficits  [x]  Reduced safety awareness [] Not following call light procedures  [] Unable to Assess  [] Other:    Current Diet Order:ADULT ORAL NUTRITION SUPPLEMENT; Breakfast; Diabetic Oral Supplement  ADULT DIET; Regular; 4 carb choices (60 gm/meal)  Swallowing Precautions: general aspiration precautions         Date: 10/31/2024      Tx session 1  [id number] Tx session 2  All tx needs met in session 1   Total Timed Code Min 60 0   Total Treatment Minutes 60 0   Individual Treatment Minutes 60 0   Group Treatment Minutes 0 0   Co-Treat Minutes 0 0   Variance/Reason:  N/A    Pain Denies     Pain  Intervention [] RN notified  [] Repositioned  [] Intervention offered and patient declined  [x] N/A  [] Other: [] RN notified  [] Repositioned  [] Intervention offered and patient declined  [] N/A  [] Other:   Subjective     Pt upright in bedside chair with wife and grandson at bedside.     Objective:  Goals     Short Term Goals  Time Frame for Short Term Goals: 10 Days (11/05/24)    Goal 1: Pt will complete graded recall tasks using compensatory strategies with 80% acc given min cues        WRAPP Memory Strategies:  -SLP education regarding writing down information in order to encode it  - Pt unable to write with dominant hand due to upper extremity hemiplegia; pt also unable to write with upper left extremity (but also did not attempt). SLP guided pt rhoguh mental imagery in order to aid in recall. Pt required mod cues to create imagery to aid in recall.   - Pt recalled 4 biographical facts about SLP following 5 minute delay with 1/4 acc without cues, 2/2 acc with min cues, 3/4 with mod cues  - Pt again denied concerns with memory stating \"I remember what I need to\"        Goal 2: Pt will complete executive function tasks (e.g. meds, time, money, etc) with 80% acc given min cues   - Answering questions regarding executive function skills related to time: 1/2 with max cues  - Ordering steps to complete tasks (6 steps): 40% acc, min cues, 60% acc with mod cues      Goal 3: Pt will complete problem solving and thought organization tasks with 80% acc given min cues   Did not target this date      Goal 4: Pt will complete verbal and visual reasoning tasks with 80% acc given min cues   2/4 acc with mod-max cues    Other areas targeted: N/A      Education:   SLP edu pt and wife re: medication management strategies and WRAPP memory strategies    Safety Devices: [x] Call light within reach  [x] Chair alarm activated  [] Bed alarm activated  [x] Other: wife at bedside  [] Call light within reach  [] Chair alarm

## 2024-10-31 NOTE — PROGRESS NOTES
Comprehensive Nutrition Assessment    Type and Reason for Visit:  Reassess    Nutrition Recommendations/Plan:   Continue carb control diet   Continue Glucerna daily   Encourage PO intakes   Monitor nutrition adequacy, pertinent labs, bowel habits, wt changes, and clinical progress     Malnutrition Assessment:  Malnutrition Status:  At risk for malnutrition (10/31/24 1212)    Context:  Acute Illness     Findings of the 6 clinical characteristics of malnutrition:  Energy Intake:  Mild decrease in energy intake  Weight Loss:  Unable to assess     Body Fat Loss:  No body fat loss   Muscle Mass Loss:  No muscle mass loss   Fluid Accumulation:  No fluid accumulation     Strength:  Not Performed    Nutrition Assessment:    Follow up: Pt remains nutritionally at risk AEB fair appetite. Pt did not eat lunch today d/t incorrect lunch provided by kitchen. Pt refused RD reordering lunch. Unclear if pt consuming ONS, variable intakes of ONS in EMR. Will continue ONS at this time. Declined any nutrition questions or concerns at this time. Will monitor.    Nutrition Related Findings:    +Metformin. Labs reviewed. Active BS. BM on 10/30. Trace RUE edema. BG stable,  past 24 hrs. Wound Type: None       Current Nutrition Intake & Therapies:    Average Meal Intake: 0%, 26-50%, 51-75%, %  Average Supplements Intake: 0%, %  ADULT ORAL NUTRITION SUPPLEMENT; Breakfast; Diabetic Oral Supplement  ADULT DIET; Regular; 4 carb choices (60 gm/meal)    Anthropometric Measures:  Height: 182.9 cm (6')  Ideal Body Weight (IBW): 178 lbs (81 kg)    Admission Body Weight: 87.2 kg (192 lb 3.9 oz)  Current Body Weight: 87.2 kg (192 lb 3.9 oz), 108 % IBW. Weight Source: Bed Scale  Current BMI (kg/m2): 26.1  Usual Body Weight: 92.1 kg (203 lb)     % Weight Change (Calculated): -5.3  Weight Adjustment For: No Adjustment                 BMI Categories: Overweight (BMI 25.0-29.9)    Estimated Daily Nutrient Needs:  Energy Requirements

## 2024-10-31 NOTE — PROGRESS NOTES
Akbar Arias  10/31/2024  [id number]    Chief Complaint: Acute CVA (cerebrovascular accident) (HCC)    Subjective:   No acute events overnight. Today Akbar is seen with his wife and grandson present. He reports feeling well. He notes sleeping well last night. He got a shower with therapy this morning and reports that it went well.     Personally reviewed labs.     ROS: denies f/c, n/v, cp, sob    Objective:  Patient Vitals for the past 24 hrs:   BP Temp Temp src Pulse Resp SpO2   10/31/24 0840 (!) 162/64 97.9 °F (36.6 °C) Oral 72 16 93 %   10/30/24 2153 (!) 160/60 97.7 °F (36.5 °C) Oral 60 16 94 %   10/30/24 1715 (!) 150/70 -- -- (!) 43 -- 99 %   10/30/24 1341 -- -- -- (!) 35 -- --     Gen: No distress, pleasant.   HEENT: Normocephalic, atraumatic.   CV: RRR  Resp: No respiratory distress. Lungs CTAB  Abd: Soft, nondistended  Ext: No edema.   Neuro: Alert, oriented, appropriately interactive. Speech fluent  Psych: appropriate mood and affect    Wt Readings from Last 3 Encounters:   10/28/24 87.2 kg (192 lb 3.9 oz)   06/04/18 108.4 kg (239 lb)   01/22/18 111.6 kg (246 lb)       Laboratory data:   Lab Results   Component Value Date    WBC 5.7 10/30/2024    HGB 10.7 (L) 10/30/2024    HCT 32.0 (L) 10/30/2024    MCV 95.0 10/30/2024     10/30/2024       Lab Results   Component Value Date/Time     10/30/2024 05:45 AM    K 4.1 10/30/2024 05:45 AM     10/30/2024 05:45 AM    CO2 21 10/30/2024 05:45 AM    BUN 33 10/30/2024 05:45 AM    CREATININE 1.6 10/30/2024 05:45 AM    GLUCOSE 115 10/30/2024 05:45 AM    CALCIUM 9.2 10/30/2024 05:45 AM        Therapy progress:  Physical therapy:  Bed Mobility:     Sit>supine:  Assistance Level: Modified independent  Skilled Clinical Factors: HOB elevated  Supine>sit:  Assistance Level: Modified independent  Skilled Clinical Factors: HOB elevated  Transfers:  Surface: To chair with arms, To chair without arms, From bed  Additional Factors: Verbal cues  Device:  (no  AD)  Sit>stand:  Assistance Level: Stand by assist  Skilled Clinical Factors: cues for safe hand placement  Stand>sit:  Assistance Level: Stand by assist  Bed<>chair     Stand Pivot:     Lateral transfer:     Car transfer:     Ambulation:  Surface: Level surface  Device:  (no AD)  Distance: 160ft + 200ft  Activity: Within Room, Within Unit  Assistance Level: Contact guard assist  Gait Deviations: Wide base of support, Path deviations  Skilled Clinical Factors: quick velocity, forward flexed posture, decreased step lengths but adequate foot clerance  Stairs:  Stair Height: 6''  Device: Bilateral handrails  Number of Stairs: 12  Assistance Level: Contact guard assist  Skilled Clinical Factors: reciprocal pattern , occ cues for RUE management on rail  Curb:     Wheelchair:       Occupational therapy:   Feeding     Grooming/Oral Hygiene     UE Bathing     LE Bathing     UE Dressing     LE Dressing  Assistance Level: Moderate assistance  Skilled Clinical Factors: assist to thread RLE into shorts, SBA to stand and manage over hips  Putting On/Taking Off Footwear  Assistance Level: Supervision  Skilled Clinical Factors: able to doff socks and don slip on shoes at EOB with setup and no VCs, SPV for sitting balance only  Toileting       Speech therapy:    ADULT ORAL NUTRITION SUPPLEMENT; Breakfast; Diabetic Oral Supplement  ADULT DIET; Regular; 4 carb choices (60 gm/meal)    Body mass index is 26.07 kg/m².    Assessment and Plan:  Akbar Arias is an 88 year old male with a past medical history significant for HTN and HLD who presented to [facility] on 10/20/24 with 3-4 weeks of RUE weakness and decreased responsiveness with aphasia, found to have left frontoparietal stroke. He was admitted to [facility] on 10/27 due to functional deficits below his baseline.      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

## 2024-10-31 NOTE — PROGRESS NOTES
Occupational Therapy  Facility/Department: [facility]  Rehabilitation Occupational Therapy Daily Treatment Note    Date: 10/31/24  Patient Name: Akbar Arias       Room: 0152/0152-01  MRN: [id number]  Account: [id number]   : 1935  (88 y.o.) Gender: male            Pt was bathed during OT session this date. Pt was Showered with soap/water with Supervision.          Past Medical History:  has a past medical history of Hyperlipidemia and Hypertension.  Past Surgical History:   has a past surgical history that includes back surgery (); eye surgery (Right, 2013); and eye surgery (Left, 13).    Restrictions  Restrictions/Precautions: Fall Risk, General Precautions  Other position/activity restrictions: up with assistance, poor L/R distinction    Subjective  Subjective: Pt in bed, pleasant, no pain, agreeable to OT session, /68, HR 89, O2 sats 98% on RA.  Restrictions/Precautions: Fall Risk;General Precautions             Objective     Cognition  Overall Cognitive Status: Exceptions  Arousal/Alertness: Appears intact  Following Commands: Follows one step commands with repetition  Attention Span: Attends with cues to redirect;Difficulty dividing attention;Difficulty attending to directions  Memory: Decreased recall of recent events;Decreased short term memory;Decreased recall of biographical Information  Safety Judgement: Decreased awareness of need for assistance;Decreased awareness of need for safety  Problem Solving: Assistance required to correct errors made;Assistance required to identify errors made;Assistance required to implement solutions;Assistance required to generate solutions;Decreased awareness of errors  Insights: Decreased awareness of deficits  Initiation: Requires cues for all  Sequencing: Requires cues for some  Orientation  Overall Orientation Status: Impaired  Orientation Level: Oriented to place;Disoriented to time;Disoriented to situation;Oriented to person          ADL  Grooming/Oral Hygiene  Assistance Level: Stand by assist;Verbal cues;Set-up;Increased time to complete  Skilled Clinical Factors: standing at sink to brush teeth, mod VCs for locating toothbrush, placing toothpaste on brush, locating cup to rinse and washcloth to wipe mouth  Upper Extremity Bathing  Assistance Level: Supervision;Verbal cues  Skilled Clinical Factors: min VCs for sequencing through bathing  Lower Extremity Bathing  Assistance Level: Stand by assist;Set-up;Verbal cues  Skilled Clinical Factors: cues to sit down a few times, cues for thoroughness to bathe buttocks and LEs, SBA in stance to bathe buttocks  Upper Extremity Dressing  Assistance Level: Maximum assistance;Set-up;Verbal cues;Increased time to complete  Skilled Clinical Factors: poor sequencing, max VCs, assist to thread BUEs into shirt and pull down in back  Lower Extremity Dressing  Assistance Level: Moderate assistance  Skilled Clinical Factors: assist to thread RLE into underwear, SBA in stance to manage over hips, min VCs for sequencing through task  Putting On/Taking Off Footwear  Assistance Level: Moderate assistance  Skilled Clinical Factors: assist to don L shoe, assist to adjust socks once donned, min cues for sequencing  Toileting  Assistance Level: Stand by assist  Skilled Clinical Factors: standing in shower to urinate  Tub/Shower Transfers  Type: Shower  Transfer From: Standing without device  Transfer To: Tub transfer bench  Additional Factors: Set-up;Verbal cues;With handrails;Cues for hand placement  Assistance Level: Contact guard assist  Skilled Clinical Factors: no AD          Functional Mobility  Activity: To/From therapy gym;To/From bathroom  Assistance Level: Minimal assistance  Skilled Clinical Factors: CGA/min A in gym, SBA/CGA in room/bathroom, standing for 8 minutes in gym and 3-4 minutes at sink  Bed Mobility  Overall Assistance Level: Supervision  Additional Factors: Verbal cues;Head of bed raised;With  handrails  Supine to Sit  Assistance Level: Supervision  Transfers  Surface: From bed;To chair with arms;From chair with arms;From chair without arms;To chair without arms  Additional Factors: Set-up;With handrails  Device: Walker  Sit to Stand  Assistance Level: Stand by assist  Stand to Sit  Assistance Level: Stand by assist  Bed To/From Chair  Technique: Stand step  Assistance Level: Contact guard assist   Neuromuscular Education  Neuromuscular education: Yes  Response to Techniques: mod VCs for use of RUE but good continued use of RUE once initiated, fair coordination with RUE to place pegs in pegboard, improving slightly with repetition, fair to poor coordination for flipping cards with RUE for 28 cards with 1 dropping of cards on floor and improved FMC with repetition for card task     Assessment  Assessment  Assessment: Pt agreeable to OT session. Pt demonstrated improved balance in shower with SBA for bathing but still required mod VCs for sequencing through task. Pt required max A for UB dressing and mod VCs for sequencing/orientation of shirt. Pt performed grooming at sink with SBA and min VCs. Pt demonstrated improved use of RUE with forced use and repetition for pegboard task and card task. Pt demonstrated good ability to copy pattern on pegboard and identify high card with card game with 90% accuracy. Continue POC.  Activity Tolerance: Patient tolerated treatment well  Discharge Recommendations: 24 hour supervision or assist;Outpatient OT  OT Equipment Recommendations  Equipment Needed: No  Safety Devices  Safety Devices in place: Yes  Type of devices: Gait belt;Call light within reach;Chair alarm in place;Left in chair;Nurse notified    Patient Education  Education  Education Given To: Patient  Education Provided: Role of Therapy;ADL Function;Precautions;Safety;Transfer Training;Equipment;Mobility Training;Cognition;Plan of Care  Education Provided Comments: forced use of RUE, safety with transfers,

## 2024-10-31 NOTE — PROGRESS NOTES
surface  Device: Rolling walker  Distance: 200 ft with RW + 50 ft with no AD  Activity: Within Room;Within Unit  Assistance Level: Contact guard assist;Stand by assist  Gait Deviations: Wide base of support;Path deviations  Skilled Clinical Factors: Pt demo improved balance when ambulating with RW and safe speed. Pt requires max cues for pathfinding and unable to follow directions to turn Left and Right  Stairs  Stair Height: 6''  Device: Bilateral handrails  Number of Stairs: 16  Assistance Level: Contact guard assist  Skilled Clinical Factors: reciprocal pattern , occ cues for RUE management on rail; SBA when using non-reciprocal pattern    PT Exercises  Resistive Exercises: Pt performed the following exercises with 8# medicine ball: repeated STS x10, bicep curls 2x15, chest press 2x15, shoulder press 2x15. Pt requires max VC for sequencing and technique. PT assisting at RUE throughout all exercises  Dynamic Standing Balance Exercises: Pt performed standing while tossing 3kg ball at rebounder 1) static standing tossing ball at middle target 2) tossing ball at target told by PT 3) tossing ball at 3 number sequencing, which pt demo significant difficulty sequencing task, requires frequent cues 3) tossing ball at middle target with lateral stepping. Grossly CGA throughout session, pt retrieving ball from ground several time throughout session with CGA and VC for safe pace      ASSESSMENT/PROGRESS TOWARDS GOALS  HR 90 bpm, SpO2 98% on RA    Assessment  Assessment: Pt continues to progress well towards PT goals. Overall limited by cognition/memory and safety awareness. Pt requires CGA/SBA for transfers and ambulation with use of RW. Pt demo difficulty performing dual tasks with standing balance activities at rebounder. Pt unable to count up by 7s or name different colors other than yellow, aqua, cinnamon and copper. Pt would benefit from continued skilled PT to address current deficits.  Activity Tolerance: Patient  tolerated treatment well  Discharge Recommendations: 24 hour supervision or assist;Outpatient PT  PT Equipment Recommendations  Equipment Needed: No  Other: No DME needs anticipated    Goals  Patient Goals   Patient Goals : \"Get out of here and be able to use my R hand\"  Short Term Goals  Time Frame for Short Term Goals: 1 week 11/02/2024  Short Term Goal 1: Pt will complete bed mobility with IND  Short Term Goal 2: Pt will complete functional t/f with LRAD with SBA  Short Term Goal 3: Pt will ambulate >150' with LRAD with SBA  Short Term Goal 4: Pt will ascend/descend 12 steps with HR with SBA  Long Term Goals  Time Frame for Long Term Goals : 2 weeks 11/09/2024  Long Term Goal 1: Pt will demonstrate functional t/f with Ciro  Long Term Goal 2: Pt will ambulate >150' with LRAD with Ciro  Long Term Goal 3: Pt will ascend 12 setps with 1-2 HR with Ciro  Long Term Goal 4: Pt will demonstrate improved score on Pena to >44 to demonstrate decreased risk of falls.    PLAN OF CARE/SAFETY  Physical Therapy Plan  General Plan: 5-7 times per week  Current Treatment Recommendations: Strengthening;Balance training;ROM;Functional mobility training;Transfer training;Cognitive/Perceptual training;Endurance training;Gait training;Stair training;Neuromuscular re-education;Cognitive reorientation;Home exercise program;Safety education & training;Patient/Caregiver education & training;Equipment evaluation, education, & procurement;Positioning;Therapeutic activities  Safety Devices  Type of Devices: Gait belt;Call light within reach;Chair alarm in place;Left in chair;Patient at risk for falls;Nurse notified;All fall risk precautions in place    EDUCATION  Education  Education Given To: Patient  Education Provided: Role of Therapy;Mobility Training;Plan of Care;Precautions;Safety;Transfer Training  Education Method: Demonstration;Verbal  Barriers to Learning: Cognition  Education Outcome: Verbalized understanding;Continued education  needed    Therapy Time   Individual Concurrent Group Co-treatment   Time In 1330         Time Out 1430         Minutes 60           Timed Code Treatment Minutes: 60 Minutes       Elmira Arcos, PT, 10/31/24 at 4:01 PM

## 2024-11-01 ENCOUNTER — APPOINTMENT (OUTPATIENT)
Dept: GENERAL RADIOLOGY | Age: 89
DRG: 057 | End: 2024-11-01
Attending: STUDENT IN AN ORGANIZED HEALTH CARE EDUCATION/TRAINING PROGRAM
Payer: MEDICARE

## 2024-11-01 PROBLEM — R00.1 BRADYCARDIA: Status: ACTIVE | Noted: 2024-11-01

## 2024-11-01 LAB
ANION GAP SERPL CALCULATED.3IONS-SCNC: 8 MMOL/L (ref 3–16)
BASOPHILS # BLD: 0.1 K/UL (ref 0–0.2)
BASOPHILS NFR BLD: 0.8 %
BUN SERPL-MCNC: 23 MG/DL (ref 7–20)
CALCIUM SERPL-MCNC: 9.4 MG/DL (ref 8.3–10.6)
CHLORIDE SERPL-SCNC: 107 MMOL/L (ref 99–110)
CO2 SERPL-SCNC: 22 MMOL/L (ref 21–32)
CREAT SERPL-MCNC: 1.4 MG/DL (ref 0.8–1.3)
DEPRECATED RDW RBC AUTO: 13.3 % (ref 12.4–15.4)
EKG ATRIAL RATE: 60 BPM
EKG DIAGNOSIS: NORMAL
EKG P AXIS: 66 DEGREES
EKG P-R INTERVAL: 194 MS
EKG Q-T INTERVAL: 462 MS
EKG QRS DURATION: 118 MS
EKG QTC CALCULATION (BAZETT): 462 MS
EKG R AXIS: -11 DEGREES
EKG T AXIS: 24 DEGREES
EKG VENTRICULAR RATE: 60 BPM
EOSINOPHIL # BLD: 0.1 K/UL (ref 0–0.6)
EOSINOPHIL NFR BLD: 1.1 %
GFR SERPLBLD CREATININE-BSD FMLA CKD-EPI: 48 ML/MIN/{1.73_M2}
GLUCOSE BLD-MCNC: 108 MG/DL (ref 70–99)
GLUCOSE BLD-MCNC: 109 MG/DL (ref 70–99)
GLUCOSE BLD-MCNC: 116 MG/DL (ref 70–99)
GLUCOSE BLD-MCNC: 131 MG/DL (ref 70–99)
GLUCOSE SERPL-MCNC: 108 MG/DL (ref 70–99)
HCT VFR BLD AUTO: 33.2 % (ref 40.5–52.5)
HGB BLD-MCNC: 11 G/DL (ref 13.5–17.5)
LYMPHOCYTES # BLD: 0.9 K/UL (ref 1–5.1)
LYMPHOCYTES NFR BLD: 11.9 %
MCH RBC QN AUTO: 31.5 PG (ref 26–34)
MCHC RBC AUTO-ENTMCNC: 33.2 G/DL (ref 31–36)
MCV RBC AUTO: 95 FL (ref 80–100)
MONOCYTES # BLD: 0.7 K/UL (ref 0–1.3)
MONOCYTES NFR BLD: 9.6 %
NEUTROPHILS # BLD: 5.9 K/UL (ref 1.7–7.7)
NEUTROPHILS NFR BLD: 76.6 %
PERFORMED ON: ABNORMAL
PLATELET # BLD AUTO: 264 K/UL (ref 135–450)
PMV BLD AUTO: 9.2 FL (ref 5–10.5)
POTASSIUM SERPL-SCNC: 4.1 MMOL/L (ref 3.5–5.1)
RBC # BLD AUTO: 3.49 M/UL (ref 4.2–5.9)
SODIUM SERPL-SCNC: 137 MMOL/L (ref 136–145)
WBC # BLD AUTO: 7.7 K/UL (ref 4–11)

## 2024-11-01 PROCEDURE — 1280000000 HC REHAB R&B

## 2024-11-01 PROCEDURE — 93010 ELECTROCARDIOGRAM REPORT: CPT | Performed by: INTERNAL MEDICINE

## 2024-11-01 PROCEDURE — 85025 COMPLETE CBC W/AUTO DIFF WBC: CPT

## 2024-11-01 PROCEDURE — 93005 ELECTROCARDIOGRAM TRACING: CPT | Performed by: STUDENT IN AN ORGANIZED HEALTH CARE EDUCATION/TRAINING PROGRAM

## 2024-11-01 PROCEDURE — 74018 RADEX ABDOMEN 1 VIEW: CPT

## 2024-11-01 PROCEDURE — 80048 BASIC METABOLIC PNL TOTAL CA: CPT

## 2024-11-01 PROCEDURE — 6360000002 HC RX W HCPCS: Performed by: STUDENT IN AN ORGANIZED HEALTH CARE EDUCATION/TRAINING PROGRAM

## 2024-11-01 PROCEDURE — 6370000000 HC RX 637 (ALT 250 FOR IP): Performed by: STUDENT IN AN ORGANIZED HEALTH CARE EDUCATION/TRAINING PROGRAM

## 2024-11-01 PROCEDURE — 99223 1ST HOSP IP/OBS HIGH 75: CPT | Performed by: INTERNAL MEDICINE

## 2024-11-01 PROCEDURE — 36415 COLL VENOUS BLD VENIPUNCTURE: CPT

## 2024-11-01 RX ORDER — LACTULOSE 10 G/15ML
20 SOLUTION ORAL 3 TIMES DAILY
Status: DISCONTINUED | OUTPATIENT
Start: 2024-11-01 | End: 2024-11-09 | Stop reason: HOSPADM

## 2024-11-01 RX ADMIN — VALSARTAN 80 MG: 80 TABLET, FILM COATED ORAL at 20:22

## 2024-11-01 RX ADMIN — HEPARIN SODIUM 5000 UNITS: 5000 INJECTION INTRAVENOUS; SUBCUTANEOUS at 06:42

## 2024-11-01 RX ADMIN — METFORMIN HYDROCHLORIDE 500 MG: 500 TABLET ORAL at 08:50

## 2024-11-01 RX ADMIN — VALSARTAN 80 MG: 80 TABLET, FILM COATED ORAL at 08:49

## 2024-11-01 RX ADMIN — LACTULOSE 20 G: 20 SOLUTION ORAL at 13:59

## 2024-11-01 RX ADMIN — AMLODIPINE BESYLATE 10 MG: 5 TABLET ORAL at 08:49

## 2024-11-01 RX ADMIN — ASPIRIN 325 MG: 325 TABLET, COATED ORAL at 08:49

## 2024-11-01 RX ADMIN — SENNOSIDES AND DOCUSATE SODIUM 1 TABLET: 50; 8.6 TABLET ORAL at 20:22

## 2024-11-01 RX ADMIN — HYDRALAZINE HYDROCHLORIDE 10 MG: 10 TABLET, FILM COATED ORAL at 06:42

## 2024-11-01 RX ADMIN — METFORMIN HYDROCHLORIDE 500 MG: 500 TABLET ORAL at 16:50

## 2024-11-01 RX ADMIN — HYDRALAZINE HYDROCHLORIDE 10 MG: 10 TABLET, FILM COATED ORAL at 20:22

## 2024-11-01 RX ADMIN — ATORVASTATIN CALCIUM 40 MG: 40 TABLET, FILM COATED ORAL at 20:22

## 2024-11-01 RX ADMIN — SENNOSIDES AND DOCUSATE SODIUM 1 TABLET: 50; 8.6 TABLET ORAL at 08:49

## 2024-11-01 RX ADMIN — HYDRALAZINE HYDROCHLORIDE 10 MG: 10 TABLET, FILM COATED ORAL at 13:59

## 2024-11-01 RX ADMIN — HEPARIN SODIUM 5000 UNITS: 5000 INJECTION INTRAVENOUS; SUBCUTANEOUS at 20:23

## 2024-11-01 RX ADMIN — CLOPIDOGREL BISULFATE 75 MG: 75 TABLET ORAL at 08:50

## 2024-11-01 RX ADMIN — LACTULOSE 20 G: 20 SOLUTION ORAL at 08:49

## 2024-11-01 RX ADMIN — POLYETHYLENE GLYCOL 3350 17 G: 17 POWDER, FOR SOLUTION ORAL at 08:50

## 2024-11-01 RX ADMIN — BISACODYL 10 MG: 5 TABLET, COATED ORAL at 06:42

## 2024-11-01 RX ADMIN — HEPARIN SODIUM 5000 UNITS: 5000 INJECTION INTRAVENOUS; SUBCUTANEOUS at 13:59

## 2024-11-01 RX ADMIN — Medication 5 MG: at 20:22

## 2024-11-01 ASSESSMENT — ENCOUNTER SYMPTOMS
LEFT EYE: 0
STRIDOR: 0
SHORTNESS OF BREATH: 0
WHEEZING: 0
RIGHT EYE: 0
HEMATEMESIS: 0
SNORING: 1
HEMATOCHEZIA: 0

## 2024-11-01 ASSESSMENT — PAIN SCALES - GENERAL
PAINLEVEL_OUTOF10: 0
PAINLEVEL_OUTOF10: 0

## 2024-11-01 NOTE — PROGRESS NOTES
ACUTE REHAB UNIT: DISCHARGE  [facility]    Patient:Akbar Arias     Rehab Dx/Hx: Acute CVA (cerebrovascular accident) (HCC) [I63.9]   :1935  MRN:[id number]  Date of Admit: 10/27/2024   Date of Discharge: 2024    Subjective:   Order for patient discharge.  Reviewed discharge summary (AVS) with patient and _______ including medications, physical instructions (safety) and diet. Pt in stable condition.     Reconciled Medication List - Patient:   Medications were reviewed by RN at time of discharge with patient and/or family:  []  Via EMR   []  Via health information exchange  []  Verbal (e.g. in person, telephone, video conferencing)  []  Paper-based (e.g. fax, copies, printouts)   []  Other Methods (e.g. texting, email, CDs)    Reconciled Medication List - Subsequent provider:   [] No, current reconciled medication list not provided to the subsequent provider.  [] Yes, current reconciled medication list provided to the subsequent provider.   [] Via EMR    [] Via health information exchange  [] Verbal (e.g. in person, telephone, video conferencing)  [] Paper-based (e.g. fax, copies, printouts)   [] Other Methods (e.g. texting, email, CDs)    Discharge disposition:  Pt was discharged via:  []  Wheelchair  []  Stretcher  []  Safe ambulation and use of assistive device  []  Other:     Pt was discharged to:  []  Private car  []  Transportation service to next destination  []  Other:     Discharge destination:  []  Home  []  Skilled Nursing Facility  []  Long Term Care  []  Other:        Discharge BIMS:  Number of word repeated after first attempt:  []  0. None []  1. One []  2. Two [x]  3. Three    Able to report correct year:  []  0. Missed by >5 years, or no answer  [x]  1. Missed by 2-5 years  []  2. Missed by 1 year  []  3. Correct    Able to report correct month:   []  0. Missed by >1 month, or no answer  []  1. Missed by 6 days to 1 month  [x]  2. Accurate within 5 days    Able

## 2024-11-01 NOTE — PROGRESS NOTES
Speech Language Pathology    Name: Akbar Arias  : 1935  Medical Diagnosis: Acute CVA (cerebrovascular accident) (HCC) [I63.9]  Bradycardia [R00.1]     Pt on hold for bradycardia. Per RN and MD, pt is on medical hold for remainder of day, . Pt will be seen as medically appropriate.     Variance: 60 minutes     Keesha Baxter MA CF-SLP   Speech Language Pathologist

## 2024-11-01 NOTE — CONSULTS
Cardiac Electrophysiology Consult Note      Physician requesting consult: Katherin Izaguirre DO   Reason for Consult: bradycardia  Admission Date: 10/27/2024  Room/Bed:  0152/0152-01    Assessment:     1. Bradycardia: patient is asymptomatic. Noted on nursing assessment to have bradycardia (reported heart rate in 30's at times). He is not on telemetry. He has frequent unifocal PVC's on ECG. Frequent PVC's can artificially cause sensed bradycardia (as the cardiac contractions from the PVC's may generate lower cardiac output than normal beats and hence not picked up by peripheral testing as actual heart beats). Reasonable to place on telemetry to ensure no \"true\" bradycardia. Reasonable to avoid negative chronotropic agents.     He has frequent focal PVC's appearing to originate from outflow tract. Recent echo with normal to low normal LV systolic function. No need to repeat it. If PVC's are more than 10-15% burden, then would consider treatment them. They may be secondary to untreated sleep apnea.     No current indication for pacemaker or other intervention. The history of syncope from last year is concerning but unexplained. Extended event monitor reasonable for multiple reasons.     2. Hypertension: sub-optimal blood pressure control (systolic). Monitor clinically and consider medication adjustments.    3. Possible obstructive sleep apnea: recommend outpatient evaluation and formal testing.     4. Post stroke: as per notes, \"large vessel atherosclerotic disease\" origin suspected. No atrial arrhythmias documented. Recommended 30-day monitor on discharge to assess for possible atrial fibrillation.     Plan:     1. Monitor on telemetry, consider event monitor on discharge  2. Avoid negative chronotropic agents  3. No new medications    Subjective:     History of Present Illness:    Patient is a 88 y.o. male with past medical history significant for recent ischemic CVA (treated at ), hypertension and dyslipidemia.

## 2024-11-01 NOTE — PROGRESS NOTES
Occupational Therapy  Discharge Summary     Name:Akbar Arias  MRN:[id number]  :1935     Diagnosis: CVA    Restrictions/Precautions  Restrictions/Precautions: Fall Risk, General Precautions           Position Activity Restriction  Other position/activity restrictions: up with assistance, poor L/R distinction     Goals:    Short Term Goals  Time Frame for Short Term Goals: 6 days- 24  Short Term Goal 1: Pt will complete toileting with SPV.  Short Term Goal 2: Pt will perform full body dressing with SPV.  Short Term Goal 3: Pt will complete full body bathing with SBA. GOAL MET 10/31/24 Pt completed full body bathing with SBA.  Short Term Goal 4: Pt will perform grooming at sink with SPV.  Long Term Goals  Time Frame for Long Term Goals : 2 weeks- 24  Long Term Goal 1: Pt will complete functional transfers with mod I.  Long Term Goal 2: Pt will perform toileting with mod I.  Long Term Goal 3: Pt will complete full body dressing with mod I.  Long Term Goal 4: Pt will perform full body bathing with SPV.  Long Term Goal 5: Pt will complete grooming at sink with mod I.    Pt. Met 1/4 short term goals and 0/5 long term goals. Pt discharged to acute care hospital due to bradycardia and medical instability.    ADL:   ADL  Grooming/Oral Hygiene  Assistance Level: Stand by assist;Verbal cues;Set-up;Increased time to complete  Skilled Clinical Factors: standing at sink to brush teeth, mod VCs for locating toothbrush, placing toothpaste on brush, locating cup to rinse and washcloth to wipe mouth  Upper Extremity Bathing  Assistance Level: Supervision;Verbal cues  Skilled Clinical Factors: min VCs for sequencing through bathing  Lower Extremity Bathing  Assistance Level: Stand by assist;Set-up;Verbal cues  Skilled Clinical Factors: cues to sit down a few times, cues for thoroughness to bathe buttocks and LEs, SBA in stance to bathe buttocks  Upper Extremity Dressing  Assistance Level: Maximum  signed by Rudolph Delgado OT on 11/1/2024 at 10:51 AM

## 2024-11-01 NOTE — PROGRESS NOTES
Occupational Therapy    Pt on hold for bradycardia. Per RN and MD, pt is on medical hold for remainder of day, 11/1. Pt will be seen as medically appropriate.    Variance: 60 minutes    Rudolph Delgado OTR/L

## 2024-11-01 NOTE — PROGRESS NOTES
Physical Therapy  Facility/Department: [facility]  Rehabilitation Physical Therapy Treatment Note    NAME: Akbar Arias  : 1935 (88 y.o.)  MRN: [id number]  CODE STATUS: Full Code    Date of Service: 24    Attempted to see pt for PT tx. Pt med hold due to bradycardia. Will continue to follow as medically appropriate.     Therapy Time  Variance: 60    Elmira Arcos PT, 24 at 1:42 PM

## 2024-11-01 NOTE — PROGRESS NOTES
Physical Therapy  Discharge Summary    Name:Akbar Arias  MRN:[id number]  :1935     Diagnosis: CVA    Restrictions/Precautions  Restrictions/Precautions: Fall Risk, General Precautions     Position Activity Restriction  Other position/activity restrictions: up with assistance, poor L/R distinction     Goals:                  Short Term Goals  Time Frame for Short Term Goals: 1 week 2024  Short Term Goal 1: Pt will complete bed mobility with IND-- MET  Short Term Goal 2: Pt will complete functional t/f with LRAD with SBA-- MET  Short Term Goal 3: Pt will ambulate >150' with LRAD with SBA  Short Term Goal 4: Pt will ascend/descend 12 steps with HR with SBA            Long Term Goals  Time Frame for Long Term Goals : 2 weeks 2024  Long Term Goal 1: Pt will demonstrate functional t/f with Ciro  Long Term Goal 2: Pt will ambulate >150' with LRAD with Ciro  Long Term Goal 3: Pt will ascend 12 setps with 1-2 HR with Ciro  Long Term Goal 4: Pt will demonstrate improved score on Pena to >44 to demonstrate decreased risk of falls.    Pt. Met 2/4 short term goals and 0/4 long term goals. Pt discharged to acute care due to bradycardia and medical instability    Pt. Currently ambulates 200 feet with CGA and RW  Up/down 16 steps with CGA  Up/down curb step with CGA  Sit to/from stand with SBA  Bed mobility with IND    Electronically signed by Elmira Arcos PT on 2024 at 11:46 AM

## 2024-11-01 NOTE — CARE COORDINATION
Case Management Discharge Summary  [facility] Rehab Unit    Patient:Akbar Arias     Rehab Dx/Hx: Acute CVA (cerebrovascular accident) (HCC) [I63.9]  Bradycardia [R00.1]       Today's Date: 11/1/2024    Discharge to:  [facility]    Pre-certification completed:  [] No       [] Yes     [x] N/A   Hospital Exemption Notification (HENS) completed:  [] No       [] Yes     [x] N/A   IMM given:  N/A       New Durable Medical Equipment ordered/agency:  N/A   Transportation:  Medical Transport explained to pt/family. Pt/family voice no agency preference.    Agency used: in-house transportation - stretcher   time: N/A  Ambulance form completed: [] No       [] Yes   [x] N/A       Confirmed discharge plan with:  Patient: [] No       [x] Yes Per Dr. Fraire  Family:  [] No       [x] Yes    Per   Name:Marielos Arias - wife  Contact number: [phone]  Facility/Agency, name:N/A    RN, name: Kirsty HO       Has lack of transportation kept you from medical appointments, meetings, work, or ADL's? [] Yes, it has kept me from medical appointments or from getting my meds  [] Yes, It has kept me from non-medical meetings, appointments, work, or getting things I need  [x] No  [] Pt unable to respond  [] Pt declines to respond     How often do you need to have someone help you when you read instructions, pamphlets, or other written material from your doctor or pharmacy? [] Never                             [x] Always  [] Rarely                            [] Patient declines to respond  [] Sometimes                    [] Patient unable to respond  [] Often       Note: Discharging nurse to complete SAADIA, reconcile AVS, and place final copy with patient's discharge packet. RN to ensure that written prescriptions for  Level II medications are sent with patient to the facility as per protocol.        Acute off. Dr. Fraire confirms she has spoken with wife regarding  transfer.     Signature: Sara Quach RN

## 2024-11-01 NOTE — PROGRESS NOTES
IRF [facility] Discharge Assessment  [facility] Acute Rehab Unit    Patient:Akbar Arias     Rehab Dx/Hx: Acute CVA (cerebrovascular accident) (HCC) [I63.9]   :1935  MRN:[id number]  Date of Admit: 10/27/2024     Pain Effect on Sleep:  Over the past 5 days, how much of the time has pain made it hard for you to sleep at night?  [x]  0. Does not apply - I have not had any pain or hurting in the past 5 days  []  1. Rarely or not at all  []  2. Occasionally  []  3. Frequently  []  4. Almost constantly  []  8. Unable to answer    Over the past 5 days, how often have you limited your participation in rehabilitation therapy sessions due to pain?  [x]  0. Does not apply - I have not received rehabilitation therapy in the past 5 days  []  1. Rarely or not at all  []  2. Occasionally  []  3. Frequently  []  4. Almost constantly  []  8. Unable to answer    Pain Interference with Day-to-Day Activities:  Over the past 5 days, how often have you limited your day-to-day activities (excluding rehabilitation therapy session)?  []  1. Rarely or not at all  [x]  2. Occasionally  []  3. Frequently  []  4. Almost constantly  []  8. Unable to answer      High-Risk Drug Classes: Use and Indication   Is taking: Check if the pt is taking any medications by pharmacological classification  Indication noted: If column 1 is checked, check if there is an indication noted for all meds in the drug class Is taking  (check all that apply) Indication noted (check all that apply)   Antipsychotic [] []   Anticoagulant [x] [x]   Antibiotic [] []   Opioid [] []   Antiplatelet [x] [x]   Hypoglycemic (including insulin) [x] [x]   None of the above [] []     Special Treatments, Procedures, and Programs   Check all of the following treatments, procedures, and programs that apply on discharge.  On discharge (check all that apply)   Cancer Treatments    A1. Chemotherapy []   A2. IV []   A3. Oral []   A10. Other []   B1. Radiation []   Respiratory  Therapies    C1. Oxygen Therapy []   C2. Continuous []   C3. Intermittent []   C4. High-concentration []   D1. Suctioning []   D2. Scheduled []   D3. As needed []   E1. Tracheostomy Care []   F1. Invasive Mechanical Ventilator (ventilator or respirator) []   G1. Non-invasive Mechanical Ventilator []   G2. BiPAP []   G3. CPAP []   Other    H1. IV Medications []   H2. Vasoactive medications []   H3. Antibiotics []   H4. Anticoagulation []   H10. Other []   I1. Transfusions []   J1. Dialysis []   J2. Hemodialysis []   J3. Peritoneal dialysis []   O1. IV access []   O2. Peripheral []   O3. Midline []   O4. Central (PICC, tunneled, port) []   None of the above [x]     Signature:  Kirsty Ellis RN

## 2024-11-01 NOTE — PROGRESS NOTES
Akbar Arias  11/1/2024  [id number]    Chief Complaint: Acute CVA (cerebrovascular accident) (HCC)    Subjective:   No acute events overnight. This morning he is seen without family present. He reports feeling ill due to constipation. XR abd obtained. Will start lactulose. Patient also with intermittent bradycardia. He denies symptoms. Medicine and Cardiology consulted.     Personally reviewed labs.     ROS: denies f/c, n/v, cp, sob    Objective:  Patient Vitals for the past 24 hrs:   BP Temp Temp src Pulse Resp SpO2   11/01/24 1345 (!) 120/59 -- -- 52 16 93 %   11/01/24 0845 -- -- -- 60 -- --   11/01/24 0839 (!) 158/71 97.7 °F (36.5 °C) Oral (!) 49 16 94 %   11/01/24 0630 (!) 130/57 -- -- -- -- --   10/31/24 2014 107/62 97.5 °F (36.4 °C) Oral 68 16 95 %     Gen: No distress, pleasant.   HEENT: Normocephalic, atraumatic.   CV: regular rate and rhythm  Resp: No respiratory distress. On room air  Abd: Soft, nondistended, bowel sounds active  Ext: No edema.   Neuro: Alert, oriented, appropriately interactive. Speech fluent  Psych: appropriate mood and affect    Wt Readings from Last 3 Encounters:   10/28/24 87.2 kg (192 lb 3.9 oz)   06/04/18 108.4 kg (239 lb)   01/22/18 111.6 kg (246 lb)       Laboratory data:   Lab Results   Component Value Date    WBC 7.7 11/01/2024    HGB 11.0 (L) 11/01/2024    HCT 33.2 (L) 11/01/2024    MCV 95.0 11/01/2024     11/01/2024       Lab Results   Component Value Date/Time     11/01/2024 06:41 AM    K 4.1 11/01/2024 06:41 AM     11/01/2024 06:41 AM    CO2 22 11/01/2024 06:41 AM    BUN 23 11/01/2024 06:41 AM    CREATININE 1.4 11/01/2024 06:41 AM    GLUCOSE 108 11/01/2024 06:41 AM    CALCIUM 9.4 11/01/2024 06:41 AM        Therapy progress:  Physical therapy:  Bed Mobility:     Sit>supine:  Assistance Level: Modified independent  Skilled Clinical Factors: HOB elevated  Supine>sit:  Assistance Level: Modified independent  Skilled Clinical Factors: HOB  Moderate assistance  Skilled Clinical Factors: assist to thread RLE into underwear, SBA in stance to manage over hips, min VCs for sequencing through task  Putting On/Taking Off Footwear  Assistance Level: Moderate assistance  Skilled Clinical Factors: assist to don L shoe, assist to adjust socks once donned, min cues for sequencing  Toileting  Assistance Level: Stand by assist  Skilled Clinical Factors: standing in shower to urinate    Speech therapy:    ADULT ORAL NUTRITION SUPPLEMENT; Breakfast; Diabetic Oral Supplement  ADULT DIET; Regular; 4 carb choices (60 gm/meal)    Body mass index is 26.07 kg/m².    Assessment and Plan:  Akbar Arias is an 88 year old male with a past medical history significant for HTN and HLD who presented to [facility] on 10/[facility]/24 with 3-4 weeks of RUE weakness and decreased responsiveness with aphasia, found to have left frontoparietal stroke. He was admitted to [facility] on 10/27 due to functional deficits below his baseline.      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 occlusion, L ICA occlusion  - MRI showing left frontoparietal stroke  - with RUE weakness  - secondary stroke prevention DAPT for 90 days with asa 325 mg and plavix 75 mg followed by asa 325 mg therapy for life  - goal BP<130/80  - 30 day cardiac event monitor  - PT, OT, ST     HTN  - valsartan, amlodipine  - started hydralazine     Bradycardia  - Medicine and Cardiology consulted, appreciate assistance    Constipation  - increase bowel regimen    Prediabetes  - HbA1c 5.9  - metformin   - SSI     CKD Stage 3  - unclear baseline Cr  - 1.4 on discharge from   - continue to monitor intermittently     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  IMC if PVR > 200ml or if any volume is > 500 ml.      Sleep: melatonin provided prn.      PPX  DVT: heparin  GI: not indicated     Follow up appointments: PCP    Therapy progress: therapy hold  today due to cardiac evaluation   Services: OP PT, OT,ST; 24 hour assistance  DME: [facility]  EDOD: 11/9    Geri Fraire MD 11/1/2024, 3:20 PM

## 2024-11-01 NOTE — CARE COORDINATION
BION Tears - Preservative Free tear - may need to order online or ask your pharmacist   CM update: Spoke with Dr. Fraire and confirmed that patient is no longer going to acute off. Patient will plan to be placed back on therapy schedule for tomorrow. Spoke with patient and wife at bedside to reinforce plan to continue with originally scheduled family training for 11/8 from 1:30-2:30pm - wife confirms she is still able to attend. Patient and wife report they would still like to do OP therapy at discharge. Wife states she wants to change therapy location to [facility] instead of [facility]. I assured that I will update patient and wife with any new discharge recommendations made. No further questions/concerns at this time. Will continue to follow.    Sara Quach RN

## 2024-11-01 NOTE — CONSULTS
Hospital Medicine Consult History & Physical    Date of Service: Pt seen/examined in consultation on 11/1/24 at the request of Geri Fraire MD for medical management of bradycardia     Chief Complaint: Bradycardia     Presenting Admission History:      Patient is an 88-year-old male with a past medical history of hypertension, prediabetes and CKD stage III who presented to inpatient rehab after a left MCA CVA.  We were consulted for bradycardia.  Patient was seen and examined.  He denies any heart history besides hypertension.  He denies any symptoms with a low heart rate including shortness of breath, lightheadedness or dizziness.  He states overall he feels well.    Assessment/Plan:    Current Principal Problem:  Acute CVA (cerebrovascular accident) (HCC)    Bradycardia  - Asymptomatic  - No medications with the side effect of bradycardia  - TSH is ordered  - Cardiology was consulted and I had a discussion with them recommended an event monitor.  He states that his PVCs are very frequent and that is why it appears his heart rate is very low.  He recommends an event monitor so that we know what his PVC burden is.  As to not interrupt his rehab I made the decision for him to stay in the ARU and not go upstairs for telemetry.  - Recommend that he continue with physical therapy and Occupational Therapy as tolerated as long as he remains asymptomatic.    Left MCA CVA  - Due to occlusion of the right M2  - MRI showing left frontal parietal stroke  - DAPT for 90 days with aspirin 325 and Plavix 75, then aspirin 325 alone for life  - PT OT per primary team    Hypertension  - Continue valsartan and amlodipine  - Hydralazine was by primary team     Constipation  - Per primary team    Prediabetes  - Last A1c of 5.9%   - Continue metformin, would recommend discontinuation if GFR drops below 30    CKD stage III  - Baseline unknown  - Creatinine of 1.4  - Continue to avoid nephrotoxic medications    Suspected ROME  -  \"ALT\", \"BILIDIR\", \"BILITOT\", \"ALKPHOS\" in the last 72 hours.  No results for input(s): \"INR\", \"LACTA\", \"TSH\" in the last 72 hours.    Consults:    IP CONSULT TO CASE MANAGEMENT  IP CONSULT TO DIETITIAN  IP CONSULT TO INTERNAL MEDICINE  IP CONSULT TO CARDIOLOGY     Katherin Izaguirre DO

## 2024-11-02 LAB
GLUCOSE BLD-MCNC: 112 MG/DL (ref 70–99)
GLUCOSE BLD-MCNC: 116 MG/DL (ref 70–99)
GLUCOSE BLD-MCNC: 121 MG/DL (ref 70–99)
GLUCOSE BLD-MCNC: 141 MG/DL (ref 70–99)
PERFORMED ON: ABNORMAL

## 2024-11-02 PROCEDURE — 97129 THER IVNTJ 1ST 15 MIN: CPT

## 2024-11-02 PROCEDURE — 97130 THER IVNTJ EA ADDL 15 MIN: CPT

## 2024-11-02 PROCEDURE — 97112 NEUROMUSCULAR REEDUCATION: CPT

## 2024-11-02 PROCEDURE — 97530 THERAPEUTIC ACTIVITIES: CPT

## 2024-11-02 PROCEDURE — 97535 SELF CARE MNGMENT TRAINING: CPT

## 2024-11-02 PROCEDURE — 97116 GAIT TRAINING THERAPY: CPT

## 2024-11-02 PROCEDURE — 6370000000 HC RX 637 (ALT 250 FOR IP): Performed by: STUDENT IN AN ORGANIZED HEALTH CARE EDUCATION/TRAINING PROGRAM

## 2024-11-02 PROCEDURE — 97110 THERAPEUTIC EXERCISES: CPT

## 2024-11-02 PROCEDURE — 6360000002 HC RX W HCPCS: Performed by: STUDENT IN AN ORGANIZED HEALTH CARE EDUCATION/TRAINING PROGRAM

## 2024-11-02 PROCEDURE — 1280000000 HC REHAB R&B

## 2024-11-02 RX ADMIN — VALSARTAN 80 MG: 80 TABLET, FILM COATED ORAL at 08:09

## 2024-11-02 RX ADMIN — HEPARIN SODIUM 5000 UNITS: 5000 INJECTION INTRAVENOUS; SUBCUTANEOUS at 07:32

## 2024-11-02 RX ADMIN — HYDRALAZINE HYDROCHLORIDE 10 MG: 10 TABLET, FILM COATED ORAL at 07:33

## 2024-11-02 RX ADMIN — ASPIRIN 325 MG: 325 TABLET, COATED ORAL at 08:09

## 2024-11-02 RX ADMIN — HEPARIN SODIUM 5000 UNITS: 5000 INJECTION INTRAVENOUS; SUBCUTANEOUS at 14:45

## 2024-11-02 RX ADMIN — METFORMIN HYDROCHLORIDE 500 MG: 500 TABLET ORAL at 08:09

## 2024-11-02 RX ADMIN — Medication 5 MG: at 20:50

## 2024-11-02 RX ADMIN — HYDRALAZINE HYDROCHLORIDE 10 MG: 10 TABLET, FILM COATED ORAL at 20:50

## 2024-11-02 RX ADMIN — VALSARTAN 80 MG: 80 TABLET, FILM COATED ORAL at 20:50

## 2024-11-02 RX ADMIN — AMLODIPINE BESYLATE 10 MG: 5 TABLET ORAL at 08:09

## 2024-11-02 RX ADMIN — HYDRALAZINE HYDROCHLORIDE 10 MG: 10 TABLET, FILM COATED ORAL at 14:45

## 2024-11-02 RX ADMIN — CLOPIDOGREL BISULFATE 75 MG: 75 TABLET ORAL at 08:09

## 2024-11-02 RX ADMIN — METFORMIN HYDROCHLORIDE 500 MG: 500 TABLET ORAL at 16:35

## 2024-11-02 RX ADMIN — ATORVASTATIN CALCIUM 40 MG: 40 TABLET, FILM COATED ORAL at 20:50

## 2024-11-02 RX ADMIN — HEPARIN SODIUM 5000 UNITS: 5000 INJECTION INTRAVENOUS; SUBCUTANEOUS at 20:50

## 2024-11-02 ASSESSMENT — PAIN SCALES - GENERAL
PAINLEVEL_OUTOF10: 0
PAINLEVEL_OUTOF10: 0

## 2024-11-02 NOTE — PROGRESS NOTES
Occupational Therapy  Facility/Department: [facility]  Rehabilitation Occupational Therapy Daily Treatment Note    Date: 24  Patient Name: Akbar Arias       Room: 0152/0152-01  MRN: [id number]  Account: [id number]   : 1935  (88 y.o.) Gender: male         Past Medical History:  has a past medical history of Hyperlipidemia and Hypertension.  Past Surgical History:   has a past surgical history that includes back surgery (); eye surgery (Right, 2013); and eye surgery (Left, 13).    Restrictions  Restrictions/Precautions: Fall Risk, General Precautions  Other position/activity restrictions: up with assistance, poor L/R distinction    Subjective  Subjective: Pt in bed, pleasant, no pain, agreeable to OT session, /64, HR 80, O2 sats 95% on RA.  Restrictions/Precautions: Fall Risk;General Precautions      Objective     Cognition  Overall Cognitive Status: Exceptions (Pt demo difficulty sequencing, safety awareness and with dual tasks during session)  Arousal/Alertness: Appears intact  Following Commands: Follows one step commands with repetition  Attention Span: Attends with cues to redirect;Difficulty dividing attention;Difficulty attending to directions  Memory: Decreased recall of recent events;Decreased short term memory;Decreased recall of biographical Information  Safety Judgement: Decreased awareness of need for assistance;Decreased awareness of need for safety  Problem Solving: Assistance required to correct errors made;Assistance required to identify errors made;Assistance required to implement solutions;Assistance required to generate solutions;Decreased awareness of errors  Insights: Decreased awareness of deficits  Initiation: Requires cues for all  Sequencing: Requires cues for some  Cognition Comment: impulsive during ADL session and needing cues for safety  Orientation  Overall Orientation Status: Within Functional Limits         ADL  Grooming/Oral Hygiene  Assistance  Factors: Verbal cues;Head of bed raised;With handrails  Supine to Sit  Assistance Level: Supervision  Transfers  Surface: From bed;To chair with arms;From chair with arms;From chair without arms;To chair without arms  Additional Factors: Set-up;With handrails  Device: Walker  Sit to Stand  Assistance Level: Stand by assist  Stand to Sit  Assistance Level: Stand by assist  Bed To/From Chair  Technique: Stand step  Assistance Level: Contact guard assist   Neuromuscular Education  Neuromuscular education: Yes  NDT Treatment: Upper extremity  Functional Movement Patterns: Pt engaged in task using RUE to remove various suction cups from tabletop-- pt able to pull suction cups with fair skill however demonstrated increased difficulty when crossing midline and pushing suction back onto table. Pt needing cues >50% of time for color location and color association (red-strawberry, Green-grass, etc.)  Weight Bearing  Weight Bearing Technique: Yes (Pt and spouse educated on some simple table-top weightbearing that can be done within the room using a washcloth and engaging in pushing /pulling away and a lateral sweeping motion.)  RUE Weight Bearing: Extended arm seated  OT Exercises  Exercise Treatment: Pt engaging in Hoard Exerboard this day 2x down and back for pronation/supination, finger flexion/extension and key turn     Assessment  Assessment  Assessment: Pt agreeable to OT session, cues throughout for speed/safety. Pt demonstrated improved balance in shower with SBA for bathing but still required mod VCs for sequencing through tasks and some difficulty with termination of tasks. Pt required max A for UB dressing (difficulty releasing shirt from RUE and attempting to don shirt form the top and not the bottom)-- mod VCs for sequencing/orientation of shirt. Pt improved LE dressing this day only needing Min A when pulling pants over hips. Pt performed grooming at sink with Min A for toothpaste and min VCs. Pt  engaged in

## 2024-11-02 NOTE — PLAN OF CARE
Problem: Discharge Planning  Goal: Discharge to home or other facility with appropriate resources  Outcome: Progressing     Problem: Chronic Conditions and Co-morbidities  Goal: Patient's chronic conditions and co-morbidity symptoms are monitored and maintained or improved  Outcome: Progressing     Problem: Safety - Adult  Goal: Free from fall injury  Outcome: Progressing     Problem: ABCDS Injury Assessment  Goal: Absence of physical injury  Outcome: Progressing     Problem: Skin/Tissue Integrity - Adult  Goal: Skin integrity remains intact  Outcome: Progressing  Flowsheets (Taken 11/1/2024 2021 by Cody Bradford RN)  Skin Integrity Remains Intact: Monitor for areas of redness and/or skin breakdown     Problem: Nutrition Deficit:  Goal: Optimize nutritional status  Outcome: Progressing     Problem: Pain  Goal: Verbalizes/displays adequate comfort level or baseline comfort level  Outcome: Progressing

## 2024-11-02 NOTE — PROGRESS NOTES
[facility]: ACUTE REHAB UNIT  SPEECH-LANGUAGE PATHOLOGY      [x] Daily  [] Weekly Care Conference Note  [] Discharge    Patient:Akbar Arias      :1935  MRN:[id number]  Rehab Dx/Hx: Acute CVA (cerebrovascular accident) (HCC) [I63.9]  Bradycardia [R00.1]    Precautions: falls  Home situation: lives with wife. Indep with med management. Wife manages finances.  ST Dx: [] Aphasia  [] Dysarthria  [] Apraxia   [] Oropharyngeal dysphagia [x] Cognitive Impairment  [] Other:   Date of Admit: 10/27/2024  Room #: 0152/0152-01    Current functional status (updated daily):         Pt being seen for : [] Speech/Language Treatment  [] Dysphagia Treatment [x] Cognitive Treatment  [] Other:  Communication: []WFL  [] Aphasia  [] Dysarthria  [] Apraxia  [] Pragmatic Impairment [] Non-verbal  [] Hearing Loss  [x] Other: min expressive / receptive language deficits   Cognition: [] WFL  [] Mild  [x] Moderate  [x] Severe [] Unable to Assess  [] Other:  Memory: [] WFL  [] Mild  [x] Moderate  [x] Severe [] Unable to Assess  [] Other:  Behavior: [x] Alert  [x] Cooperative  [x]  Pleasant  [] Confused  [] Agitated  [] Uncooperative  [] Distractible [] Motivated  [x] Self-Limiting [] Anxious  [] Other:  Endurance:  [x] Adequate for participation in SLP sessions  [] Reduced overall  [] Lethargic  [] Other:  Safety: [] No concerns at this time  [x] Reduced insight into deficits  [x]  Reduced safety awareness [] Not following call light procedures  [] Unable to Assess  [] Other:    Current Diet Order:ADULT ORAL NUTRITION SUPPLEMENT; Breakfast; Diabetic Oral Supplement  ADULT DIET; Regular; 4 carb choices (60 gm/meal)  Swallowing Precautions: general aspiration precautions         Date: 2024      Tx session 1  [id number]  Tx session 2  All needs met in tx session 1   Total Timed Code Min 60 0   Total Treatment Minutes 60 0   Individual Treatment Minutes 60 0   Group Treatment Minutes 0 0   Co-Treat Minutes 0 0   Variance/Reason:  N/A     Pain Denies     Pain Intervention [] RN notified  [] Repositioned  [] Intervention offered and patient declined  [x] N/A  [] Other: [] RN notified  [] Repositioned  [] Intervention offered and patient declined  [] N/A  [] Other:   Subjective     Pt upright in bedside chair with wife and grandson at bedside.     Objective:  Goals     Short Term Goals  Time Frame for Short Term Goals: 10 Days (11/05/24)    Goal 1: Pt will complete graded recall tasks using compensatory strategies with 80% acc given min cues            Goal indirectly targeted in goal 3. See below.    Goal 2: Pt will complete executive function tasks (e.g. meds, time, money, etc) with 80% acc given min cues   Use of Calendar   - Pt given bulletin board sheet with information regarding bills and appointments. Pt asked to recall and reference calendar (filled out by SLP d/t physical limitations) regarding questions about information   - Pt completed task but required mod-max cues.     Math word problems   - \"There are 50 seats in the gym and 35 are taken. How many are left?\"  - Pt completed task with 57% accuracy given mod-max cues.   - Pt appeared to struggle with more complex word problems that used division/outside information not included in question   Example: You need to replace that battery every 4 months. How many time in a year will you replace the battery?\"      Goal 3: Pt will complete problem solving and thought organization tasks with 80% acc given min cues   Category Members: Abstract   - Pt given category and asked to list 5 items in each (things that are cold, hot, red, green)   - Pt completed task with 80% accuracy given MOD cues    Identify Sports Logos   - SLP gave descriptions of baseball and football sports logos and provided more information about the team when pt was unable to ID based on logo.  - Pt completed task with 83% accuracy given MOD cues.   - Pt's wife noted appreciation of SLP \"doing her homework\". Pt may benefit from  activities surrounding interests (sports and the stock market)      Goal 4: Pt will complete verbal and visual reasoning tasks with 80% acc given min cues   Goal indirectly targeted in goal 2. See above     Other areas targeted: N/A      Education:   SLP edu pt and wife re: medication management strategies and WRAPP memory strategies    Safety Devices: [x] Call light within reach  [x] Chair alarm activated  [] Bed alarm activated  [x] Other: wife at bedside  [] Call light within reach  [] Chair alarm activated  [] Bed alarm activated  [] Other:    Assessment: Pt was compliant throughout session, and often needed encouragement and reminders of the benefits/reasoning regarding tasks. SLP created thought organization/recall task associate with pt's interest in sports. Pt appeared to benefit and remain more engaged in activity. Pt required mod-max cues for all tasks and required cues to attend to SLP's assistance. Pt would often close his eyes and attempt to complete question without assistance. Continued offering of assistance was required. Pt would benefit from simpler cognitive tasks surrounding interests and continued mod-max cues. SLP to follow   Plan: Continue as per plan of care.      Additional Information:     Barriers toward progress: Cognitive deficit, Impulsivity, Limited safety awareness, Decreased motivation, and Limited insight into deficits  Discharge recommendations:  [] Home independently  [] Home with assistance [x]  24 hour supervision  [] ECF [x] Other: see PT/OT recs  Continued Tx Upon Discharge: ? [x] Yes [] No [] TBD based on progress while on ARU [] Vital Stim indicated [] Other:   Estimated discharge date: TBD    Interventions used this date:  [] Speech/Language Treatment  [] Instruction in HEP [] Group [] Dysphagia Treatment [x] Cognitive Treatment   [] Other:      Total Time Breakdown / Charges    Time in Time out Total Time / units   Cognitive Tx 1230   1330   60 min / 4 units      Speech Tx

## 2024-11-02 NOTE — PROGRESS NOTES
[facility] Medicine Progress Note  V 10.25      Date of Admission: 10/27/2024    Hospital Day: 7      Consulted for:  Bradycardia     Subjective:  Patient was seen and examined this morning. He states that he had multiple bowel movements overnight and feels much better. He denies any dizziness, shortness of breath or lightheadedness.    Presenting Admission History:       Patient is an 88-year-old male with a past medical history of hypertension, prediabetes and CKD stage III who presented to inpatient rehab after a left MCA CVA. We were consulted for bradycardia. Patient was seen and examined. He denies any heart history besides hypertension. He denies any symptoms with a low heart rate including shortness of breath, lightheadedness or dizziness. He states overall he feels well.     Assessment/Plan:      Current Principal Problem:  Acute CVA (cerebrovascular accident) (HCC)    Bradycardia  - Asymptomatic  - No medications with the side effect of bradycardia  - Cardiology was consulted and I had a discussion with them recommended an event monitor.  He states that his PVCs are very frequent and that is why it appears his heart rate is very low.  He recommends an event monitor so that we know what his PVC burden is.  As to not interrupt his rehab I made the decision for him to stay in the ARU and not go upstairs for telemetry.  - Recommend that he continue with physical therapy and Occupational Therapy as tolerated as long as he remains asymptomatic.  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

## 2024-11-02 NOTE — PROGRESS NOTES
Physical Therapy  Facility/Department: [facility]  Rehabilitation Physical Therapy Treatment Note    NAME: Akbar Arias  : 1935 (88 y.o.)  MRN: [id number]  CODE STATUS: Full Code    Date of Service: 24       Restrictions:  Restrictions/Precautions: Fall Risk, General Precautions  Position Activity Restriction  Other position/activity restrictions: up with assistance, poor L/R distinction     SUBJECTIVE  Subjective  Subjective: pt sitting up in chair upon arrival, agreeable to PT tx  Pain: denies pain        Post Treatment Pain Screening         OBJECTIVE  Cognition  Overall Cognitive Status: Exceptions  Arousal/Alertness: Appears intact  Following Commands: Follows one step commands with repetition;Follows one step commands with increased time  Attention Span: Attends with cues to redirect;Difficulty dividing attention;Difficulty attending to directions  Memory: Decreased recall of recent events;Decreased short term memory;Decreased recall of biographical Information  Safety Judgement: Decreased awareness of need for assistance;Decreased awareness of need for safety  Problem Solving: Assistance required to correct errors made;Assistance required to identify errors made;Assistance required to implement solutions;Assistance required to generate solutions;Decreased awareness of errors  Insights: Decreased awareness of deficits  Initiation: Requires cues for all  Sequencing: Requires cues for some  Cognition Comment: impulsive during ADL session and needing cues for safety  Orientation  Overall Orientation Status: Within Functional Limits (knew where to look the answer for the date. Additional time needed)  Orientation Level: Oriented to place;Disoriented to time;Disoriented to situation;Oriented to person    Functional Mobility  Balance  Sitting Balance: Supervision  Standing Balance: Contact guard assistance  Transfers  Surface: To chair with arms;To chair without arms;To mat  Additional Factors: Verbal

## 2024-11-03 VITALS
TEMPERATURE: 97.8 F | RESPIRATION RATE: 16 BRPM | WEIGHT: 190.2 LBS | BODY MASS INDEX: 25.76 KG/M2 | DIASTOLIC BLOOD PRESSURE: 65 MMHG | HEIGHT: 72 IN | SYSTOLIC BLOOD PRESSURE: 154 MMHG | HEART RATE: 45 BPM | OXYGEN SATURATION: 95 %

## 2024-11-03 LAB
GLUCOSE BLD-MCNC: 101 MG/DL (ref 70–99)
GLUCOSE BLD-MCNC: 116 MG/DL (ref 70–99)
GLUCOSE BLD-MCNC: 129 MG/DL (ref 70–99)
GLUCOSE BLD-MCNC: 136 MG/DL (ref 70–99)
PERFORMED ON: ABNORMAL

## 2024-11-03 PROCEDURE — 6360000002 HC RX W HCPCS: Performed by: STUDENT IN AN ORGANIZED HEALTH CARE EDUCATION/TRAINING PROGRAM

## 2024-11-03 PROCEDURE — 1280000000 HC REHAB R&B

## 2024-11-03 PROCEDURE — 6370000000 HC RX 637 (ALT 250 FOR IP): Performed by: STUDENT IN AN ORGANIZED HEALTH CARE EDUCATION/TRAINING PROGRAM

## 2024-11-03 RX ADMIN — CLOPIDOGREL BISULFATE 75 MG: 75 TABLET ORAL at 08:48

## 2024-11-03 RX ADMIN — SENNOSIDES AND DOCUSATE SODIUM 1 TABLET: 50; 8.6 TABLET ORAL at 20:37

## 2024-11-03 RX ADMIN — HYDRALAZINE HYDROCHLORIDE 10 MG: 10 TABLET, FILM COATED ORAL at 14:23

## 2024-11-03 RX ADMIN — ATORVASTATIN CALCIUM 40 MG: 40 TABLET, FILM COATED ORAL at 20:37

## 2024-11-03 RX ADMIN — ASPIRIN 325 MG: 325 TABLET, COATED ORAL at 08:48

## 2024-11-03 RX ADMIN — HEPARIN SODIUM 5000 UNITS: 5000 INJECTION INTRAVENOUS; SUBCUTANEOUS at 14:23

## 2024-11-03 RX ADMIN — LACTULOSE 20 G: 20 SOLUTION ORAL at 20:37

## 2024-11-03 RX ADMIN — VALSARTAN 80 MG: 80 TABLET, FILM COATED ORAL at 20:37

## 2024-11-03 RX ADMIN — HEPARIN SODIUM 5000 UNITS: 5000 INJECTION INTRAVENOUS; SUBCUTANEOUS at 05:20

## 2024-11-03 RX ADMIN — HYDRALAZINE HYDROCHLORIDE 10 MG: 10 TABLET, FILM COATED ORAL at 20:37

## 2024-11-03 RX ADMIN — VALSARTAN 80 MG: 80 TABLET, FILM COATED ORAL at 08:48

## 2024-11-03 RX ADMIN — AMLODIPINE BESYLATE 10 MG: 5 TABLET ORAL at 08:00

## 2024-11-03 RX ADMIN — HYDRALAZINE HYDROCHLORIDE 10 MG: 10 TABLET, FILM COATED ORAL at 08:00

## 2024-11-03 RX ADMIN — METFORMIN HYDROCHLORIDE 500 MG: 500 TABLET ORAL at 08:48

## 2024-11-03 RX ADMIN — LACTULOSE 20 G: 20 SOLUTION ORAL at 08:48

## 2024-11-03 RX ADMIN — METFORMIN HYDROCHLORIDE 500 MG: 500 TABLET ORAL at 17:19

## 2024-11-03 RX ADMIN — SENNOSIDES AND DOCUSATE SODIUM 1 TABLET: 50; 8.6 TABLET ORAL at 08:48

## 2024-11-03 RX ADMIN — HEPARIN SODIUM 5000 UNITS: 5000 INJECTION INTRAVENOUS; SUBCUTANEOUS at 20:37

## 2024-11-03 ASSESSMENT — PAIN SCALES - GENERAL
PAINLEVEL_OUTOF10: 0
PAINLEVEL_OUTOF10: 0

## 2024-11-03 NOTE — PROGRESS NOTES
Akbar Arias  11/2/2024  [id number]    Chief Complaint: Acute CVA (cerebrovascular accident) (HCC)    Subjective:   No acute events overnight. Family present at bedside. Patient states that he is feeling well and denies any new onset complaints.    ROS: denies f/c, n/v, cp, sob    Objective:  Patient Vitals for the past 24 hrs:   BP Temp Temp src Pulse Resp SpO2   11/02/24 2045 (!) 153/69 97.7 °F (36.5 °C) Oral 58 18 93 %   11/02/24 1500 -- -- -- 59 -- 99 %   11/02/24 1439 131/62 -- -- 58 -- --   11/02/24 1331 (!) 153/66 -- -- 57 -- 95 %   11/02/24 0759 (!) 153/66 97.8 °F (36.6 °C) Oral 57 18 95 %   11/02/24 0733 (!) 150/72 -- -- -- -- --     Gen: No distress, pleasant.   HEENT: Normocephalic, atraumatic.   CV: regular rate and rhythm  Resp: No respiratory distress. On room air  Abd: Soft, nondistended, bowel sounds active  Ext: No edema.   Neuro: Alert, oriented, appropriately interactive. Speech fluent  Psych: appropriate mood and affect    Wt Readings from Last 3 Encounters:   10/28/24 87.2 kg (192 lb 3.9 oz)   06/04/18 108.4 kg (239 lb)   01/22/18 111.6 kg (246 lb)       Laboratory data:   Lab Results   Component Value Date    WBC 7.7 11/01/2024    HGB 11.0 (L) 11/01/2024    HCT 33.2 (L) 11/01/2024    MCV 95.0 11/01/2024     11/01/2024       Lab Results   Component Value Date/Time     11/01/2024 06:41 AM    K 4.1 11/01/2024 06:41 AM     11/01/2024 06:41 AM    CO2 22 11/01/2024 06:41 AM    BUN 23 11/01/2024 06:41 AM    CREATININE 1.4 11/01/2024 06:41 AM    GLUCOSE 108 11/01/2024 06:41 AM    CALCIUM 9.4 11/01/2024 06:41 AM        Therapy progress:  Physical therapy:  Bed Mobility:     Sit>supine:  Assistance Level: Modified independent  Skilled Clinical Factors: HOB elevated  Supine>sit:  Assistance Level: Modified independent  Skilled Clinical Factors: HOB elevated  Transfers:  Surface: To chair with arms, To chair without arms, To mat  Additional Factors: Verbal cues, Increased time to  pull down in back-- attempting to don shirt from the top of the shirt vs the bottom-- difficulty releasing shirt out of R hand without cues and assist  LE Dressing  Assistance Level: Minimal assistance  Skilled Clinical Factors: Pt able to thread LEs this day in underwear and pants using a figure-4 stance while seated on TTB. Needed assist when pulling pants up d/t pt attempting to pull underwear and pants up at the same time-- poor self correction and task termination  Putting On/Taking Off Footwear  Assistance Level: Stand by assist, Set-up  Skilled Clinical Factors: don socks and shoes seated with SBA this day  Toileting  Assistance Level: Stand by assist  Skilled Clinical Factors: standing in shower to urinate    Speech therapy:    ADULT ORAL NUTRITION SUPPLEMENT; Breakfast; Diabetic Oral Supplement  ADULT DIET; Regular; 4 carb choices (60 gm/meal)    Body mass index is 26.07 kg/m².    Assessment and Plan:  Akbar Arias is an 88 year old male with a past medical history significant for HTN and HLD who presented to [facility] on 10/20/24 with 3-4 weeks of RUE weakness and decreased responsiveness with aphasia, found to have left frontoparietal stroke. He was admitted to [facility] on 10/27 due to functional deficits below his baseline.      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 occlusion, L ICA occlusion  - MRI showing left frontoparietal stroke  - with RUE weakness  - secondary stroke prevention DAPT for 90 days with asa 325 mg and plavix 75 mg followed by asa 325 mg therapy for life  - goal BP<130/80  - 30 day cardiac event monitor  - PT, OT, ST     HTN  - valsartan, amlodipine  - started hydralazine     Bradycardia  - Medicine and Cardiology consulted, appreciate assistance    Constipation  - increase bowel regimen    Prediabetes  - HbA1c 5.9  - metformin   - SSI     CKD Stage 3  - unclear baseline Cr  - 1.4 on discharge from   - continue to monitor intermittently     Suspected ROME  - needs sleep study as outpatient

## 2024-11-04 LAB
ANION GAP SERPL CALCULATED.3IONS-SCNC: 9 MMOL/L (ref 3–16)
BASOPHILS # BLD: 0.1 K/UL (ref 0–0.2)
BASOPHILS NFR BLD: 1.4 %
BUN SERPL-MCNC: 22 MG/DL (ref 7–20)
CALCIUM SERPL-MCNC: 9.2 MG/DL (ref 8.3–10.6)
CHLORIDE SERPL-SCNC: 104 MMOL/L (ref 99–110)
CO2 SERPL-SCNC: 24 MMOL/L (ref 21–32)
CREAT SERPL-MCNC: 1.6 MG/DL (ref 0.8–1.3)
DEPRECATED RDW RBC AUTO: 13.6 % (ref 12.4–15.4)
EOSINOPHIL # BLD: 0.1 K/UL (ref 0–0.6)
EOSINOPHIL NFR BLD: 2.4 %
GFR SERPLBLD CREATININE-BSD FMLA CKD-EPI: 41 ML/MIN/{1.73_M2}
GLUCOSE BLD-MCNC: 101 MG/DL (ref 70–99)
GLUCOSE BLD-MCNC: 103 MG/DL (ref 70–99)
GLUCOSE BLD-MCNC: 119 MG/DL (ref 70–99)
GLUCOSE BLD-MCNC: 124 MG/DL (ref 70–99)
GLUCOSE SERPL-MCNC: 104 MG/DL (ref 70–99)
HCT VFR BLD AUTO: 34.4 % (ref 40.5–52.5)
HGB BLD-MCNC: 11.4 G/DL (ref 13.5–17.5)
LYMPHOCYTES # BLD: 1.1 K/UL (ref 1–5.1)
LYMPHOCYTES NFR BLD: 23.9 %
MCH RBC QN AUTO: 31.2 PG (ref 26–34)
MCHC RBC AUTO-ENTMCNC: 33.1 G/DL (ref 31–36)
MCV RBC AUTO: 94.3 FL (ref 80–100)
MONOCYTES # BLD: 0.6 K/UL (ref 0–1.3)
MONOCYTES NFR BLD: 12.8 %
NEUTROPHILS # BLD: 2.8 K/UL (ref 1.7–7.7)
NEUTROPHILS NFR BLD: 59.5 %
PERFORMED ON: ABNORMAL
PLATELET # BLD AUTO: 287 K/UL (ref 135–450)
PMV BLD AUTO: 9.1 FL (ref 5–10.5)
POTASSIUM SERPL-SCNC: 4.2 MMOL/L (ref 3.5–5.1)
RBC # BLD AUTO: 3.65 M/UL (ref 4.2–5.9)
SODIUM SERPL-SCNC: 137 MMOL/L (ref 136–145)
WBC # BLD AUTO: 4.7 K/UL (ref 4–11)

## 2024-11-04 PROCEDURE — 97110 THERAPEUTIC EXERCISES: CPT

## 2024-11-04 PROCEDURE — 97129 THER IVNTJ 1ST 15 MIN: CPT

## 2024-11-04 PROCEDURE — 36415 COLL VENOUS BLD VENIPUNCTURE: CPT

## 2024-11-04 PROCEDURE — 6360000002 HC RX W HCPCS: Performed by: STUDENT IN AN ORGANIZED HEALTH CARE EDUCATION/TRAINING PROGRAM

## 2024-11-04 PROCEDURE — 6370000000 HC RX 637 (ALT 250 FOR IP): Performed by: STUDENT IN AN ORGANIZED HEALTH CARE EDUCATION/TRAINING PROGRAM

## 2024-11-04 PROCEDURE — 85025 COMPLETE CBC W/AUTO DIFF WBC: CPT

## 2024-11-04 PROCEDURE — 97535 SELF CARE MNGMENT TRAINING: CPT

## 2024-11-04 PROCEDURE — 1280000000 HC REHAB R&B

## 2024-11-04 PROCEDURE — 97130 THER IVNTJ EA ADDL 15 MIN: CPT

## 2024-11-04 PROCEDURE — 80048 BASIC METABOLIC PNL TOTAL CA: CPT

## 2024-11-04 PROCEDURE — 97530 THERAPEUTIC ACTIVITIES: CPT

## 2024-11-04 PROCEDURE — 97112 NEUROMUSCULAR REEDUCATION: CPT

## 2024-11-04 PROCEDURE — 97116 GAIT TRAINING THERAPY: CPT

## 2024-11-04 RX ORDER — HYDRALAZINE HYDROCHLORIDE 25 MG/1
25 TABLET, FILM COATED ORAL EVERY 8 HOURS SCHEDULED
Status: DISCONTINUED | OUTPATIENT
Start: 2024-11-04 | End: 2024-11-09 | Stop reason: HOSPADM

## 2024-11-04 RX ADMIN — SENNOSIDES AND DOCUSATE SODIUM 1 TABLET: 50; 8.6 TABLET ORAL at 20:35

## 2024-11-04 RX ADMIN — VALSARTAN 80 MG: 80 TABLET, FILM COATED ORAL at 08:16

## 2024-11-04 RX ADMIN — HYDRALAZINE HYDROCHLORIDE 25 MG: 25 TABLET ORAL at 14:11

## 2024-11-04 RX ADMIN — METFORMIN HYDROCHLORIDE 500 MG: 500 TABLET ORAL at 16:38

## 2024-11-04 RX ADMIN — LACTULOSE 20 G: 20 SOLUTION ORAL at 08:17

## 2024-11-04 RX ADMIN — HEPARIN SODIUM 5000 UNITS: 5000 INJECTION INTRAVENOUS; SUBCUTANEOUS at 05:57

## 2024-11-04 RX ADMIN — CLOPIDOGREL BISULFATE 75 MG: 75 TABLET ORAL at 08:17

## 2024-11-04 RX ADMIN — METFORMIN HYDROCHLORIDE 500 MG: 500 TABLET ORAL at 08:16

## 2024-11-04 RX ADMIN — SENNOSIDES AND DOCUSATE SODIUM 1 TABLET: 50; 8.6 TABLET ORAL at 08:16

## 2024-11-04 RX ADMIN — AMLODIPINE BESYLATE 10 MG: 5 TABLET ORAL at 08:16

## 2024-11-04 RX ADMIN — Medication 5 MG: at 20:35

## 2024-11-04 RX ADMIN — HEPARIN SODIUM 5000 UNITS: 5000 INJECTION INTRAVENOUS; SUBCUTANEOUS at 14:11

## 2024-11-04 RX ADMIN — HYDRALAZINE HYDROCHLORIDE 10 MG: 10 TABLET, FILM COATED ORAL at 05:57

## 2024-11-04 RX ADMIN — ASPIRIN 325 MG: 325 TABLET, COATED ORAL at 08:17

## 2024-11-04 RX ADMIN — HEPARIN SODIUM 5000 UNITS: 5000 INJECTION INTRAVENOUS; SUBCUTANEOUS at 20:36

## 2024-11-04 RX ADMIN — ATORVASTATIN CALCIUM 40 MG: 40 TABLET, FILM COATED ORAL at 20:35

## 2024-11-04 RX ADMIN — LACTULOSE 20 G: 20 SOLUTION ORAL at 14:11

## 2024-11-04 RX ADMIN — VALSARTAN 80 MG: 80 TABLET, FILM COATED ORAL at 20:34

## 2024-11-04 RX ADMIN — HYDRALAZINE HYDROCHLORIDE 25 MG: 25 TABLET ORAL at 20:35

## 2024-11-04 ASSESSMENT — PAIN SCALES - GENERAL
PAINLEVEL_OUTOF10: 0
PAINLEVEL_OUTOF10: 0

## 2024-11-04 NOTE — PROGRESS NOTES
Recommendations: 24 hour supervision or assist;Outpatient PT  PT Equipment Recommendations  Equipment Needed: No  Other: No DME needs anticipated    Goals  Patient Goals   Patient Goals : \"Get out of here and be able to use my R hand\"  Short Term Goals  Time Frame for Short Term Goals: 1 week 11/02/2024  Short Term Goal 1: Pt will complete bed mobility with IND--11/1 MET  Short Term Goal 2: Pt will complete functional t/f with LRAD with SBA--11/1 MET  Short Term Goal 3: Pt will ambulate >150' with LRAD with SBA --11/4 NOT MET CGA due to poor safety awareness  Short Term Goal 4: Pt will ascend/descend 12 steps with HR with SBA --progressing 10/31 16 steps with CGA  Long Term Goals  Time Frame for Long Term Goals : 2 weeks 11/09/2024  Long Term Goal 1: Pt will demonstrate functional t/f with Ciro  Long Term Goal 2: Pt will ambulate >150' with LRAD with Ciro  Long Term Goal 3: Pt will ascend 12 setps with 1-2 HR with Ciro  Long Term Goal 4: Pt will demonstrate improved score on Pena to >44 to demonstrate decreased risk of falls.    PLAN OF CARE/SAFETY  Physical Therapy Plan  General Plan: 5-7 times per week  Current Treatment Recommendations: Strengthening;Balance training;ROM;Functional mobility training;Transfer training;Cognitive/Perceptual training;Endurance training;Gait training;Stair training;Neuromuscular re-education;Cognitive reorientation;Home exercise program;Safety education & training;Patient/Caregiver education & training;Equipment evaluation, education, & procurement;Positioning;Therapeutic activities  Safety Devices  Type of Devices: Gait belt;Call light within reach;Chair alarm in place;Left in chair;Patient at risk for falls;Nurse notified;All fall risk precautions in place  Restraints  Restraints Initially in Place: No    EDUCATION  Education  Education Given To: Patient  Education Provided: Role of Therapy;Mobility Training;Plan of Care;Precautions;Safety;Transfer Training;Equipment;Energy  Conservation  Education Provided Comments: Educated on role of PT, goals and safe progression of mobility. Educated on schedule and care conferences on ARU.  Education Method: Demonstration;Verbal  Barriers to Learning: Cognition  Education Outcome: Verbalized understanding;Continued education needed        Therapy Time   Individual Concurrent Group Co-treatment   Time In 1300         Time Out 1400         Minutes 60           Timed Code Treatment Minutes: 60 Minutes       Amber Chaney, PT, 11/04/24 at 4:19 PM

## 2024-11-04 NOTE — PLAN OF CARE
Problem: Discharge Planning  Goal: Discharge to home or other facility with appropriate resources  Outcome: Progressing     Problem: Chronic Conditions and Co-morbidities  Goal: Patient's chronic conditions and co-morbidity symptoms are monitored and maintained or improved  Outcome: Progressing     Problem: Safety - Adult  Goal: Free from fall injury  11/3/2024 2236 by Joyce Melendez, RN  Outcome: Progressing  11/3/2024 1548 by Lynsey Reina RN  Outcome: Progressing     Problem: ABCDS Injury Assessment  Goal: Absence of physical injury  Outcome: Progressing     Problem: Skin/Tissue Integrity - Adult  Goal: Skin integrity remains intact  Outcome: Progressing     Problem: Nutrition Deficit:  Goal: Optimize nutritional status  Outcome: Progressing     Problem: Pain  Goal: Verbalizes/displays adequate comfort level or baseline comfort level  Outcome: Progressing

## 2024-11-04 NOTE — PROGRESS NOTES
Occupational Therapy  Facility/Department: [facility]  Rehabilitation Occupational Therapy Daily Treatment Note    Date: 24  Patient Name: Akbar Arias       Room: 0152/0152-01  MRN: [id number]  Account: [id number]   : 1935  (88 y.o.) Gender: male         Pt was bathed during OT session this date. Pt was Showered with soap/water with Stand by assistance.             Past Medical History:  has a past medical history of Hyperlipidemia and Hypertension.  Past Surgical History:   has a past surgical history that includes back surgery (); eye surgery (Right, 2013); and eye surgery (Left, 13).    Restrictions  Restrictions/Precautions: Fall Risk, General Precautions  Other position/activity restrictions: up with assistance, poor L/R distinction, impulsive    Subjective  Subjective: Pt in bed, pleasant, no pain, agreeable to OT session, /67, HR 65, O2 sats 96% on RA.  Restrictions/Precautions: Fall Risk;General Precautions             Objective     Cognition  Overall Cognitive Status: Exceptions  Arousal/Alertness: Appears intact  Following Commands: Follows one step commands with repetition;Follows one step commands with increased time  Attention Span: Attends with cues to redirect;Difficulty dividing attention;Difficulty attending to directions  Memory: Decreased recall of recent events;Decreased short term memory;Decreased recall of biographical Information  Safety Judgement: Decreased awareness of need for assistance;Decreased awareness of need for safety  Problem Solving: Assistance required to correct errors made;Assistance required to identify errors made;Assistance required to implement solutions;Assistance required to generate solutions;Decreased awareness of errors  Insights: Decreased awareness of deficits  Initiation: Requires cues for all  Sequencing: Requires cues for some  Orientation  Overall Orientation Status: Within Functional Limits         ADL  Grooming/Oral  with arms  Additional Factors: Set-up;With handrails  Sit to Stand  Assistance Level: Supervision  Stand to Sit  Assistance Level: Stand by assist  Bed To/From Chair  Technique: Stand step  Assistance Level: Contact guard assist   Neuromuscular Education  Neuromuscular education: Yes  Facilitation techniques: min/mod VCs for sequencing through bathing tasks, no VCs with setup for dressing tasks; mod VCs for sequencing through task to walk to corresponding colored dot when tossing bean bags to target; min VCs for locating Squigz on wall, especially on R side    Response to Techniques: improved use of RUE with repetition and forced use for grasping Squigz, tossing bean bags, and grasping/placing LEGOs, 1/10 Squigz and 1/8 LEGOs pt unable to process how to grasp LEGO with RUE and cues/Hopi required to complete part of task, able to hold bin of bean bags with RUE for last rep of tossing bean bags with no dropping of bin; good ability to hold large exercise ball with BUEs while ambulating with no LOB or decreased balance    OT Exercises  Resistive Exercises: 4# medicine ball for 20 reps of obliques, core rotation, shoulder flexion, elbow flexion, and chest press with BUEs     Assessment  Assessment  Assessment: Pt agreeable to OT session. Pt demonstrated improved sequencing through ADLs this date with setup for dressing, but still required min/mod VCs for bathing thoroughness. Pt completed mobility with CGA/Min A and HHA on L to gather Squigz in hallway with min VCs for locating Squigz, especially on R side. Pt required min/mod VCs for sequencing through bean bag task with poor sequencing but did improved sequencing with repetition. Pt demonstrated improved FMC for grasping Squigz and grasping/placing LEGOs with RUE, especially with repetition. Continue POC.  Activity Tolerance: Patient tolerated treatment well  Discharge Recommendations: 24 hour supervision or assist;Outpatient OT  OT Equipment Recommendations  Equipment

## 2024-11-04 NOTE — CARE COORDINATION
CM update: Spoke with patient and wife Marielos in room to evaluate any questions/ concerns they may have. Wife reports she and patient are still agreeable to discharge on 11/9 and confirms she is still planning to attend family training scheduled for 11/8 from 1:30-2:30pm. Wife reports she no longer wants to do patient's OP therapy at [facility]. She states the [facility] is a better location - Dr. Fraire made aware. Patient and wife are aware that a RW will be provided at discharge. No further questions/concerns at this time. Will continue to follow.     Sara Quach RN

## 2024-11-04 NOTE — PLAN OF CARE
Problem: Chronic Conditions and Co-morbidities  Goal: Patient's chronic conditions and co-morbidity symptoms are monitored and maintained or improved  11/4/2024 0908 by Kirsty Ellis RN  Outcome: Progressing  11/3/2024 2236 by Joyce Melendez RN  Outcome: Progressing     Problem: Safety - Adult  Goal: Free from fall injury  11/4/2024 0908 by Kirsty Ellis RN  Outcome: Progressing  11/3/2024 2236 by Joyce Melendez RN  Outcome: Progressing

## 2024-11-04 NOTE — PROGRESS NOTES
Akbar Arias  11/3/2024  [id number]    Chief Complaint: Acute CVA (cerebrovascular accident) (HCC)    Subjective:   Patient states that he is feeling well and denies any new onset complaints.    ROS: denies f/c, n/v, cp, sob    Objective:  Patient Vitals for the past 24 hrs:   BP Temp Temp src Pulse Resp SpO2 Weight   11/03/24 0830 (!) 160/70 98 °F (36.7 °C) Oral 52 16 94 % --   11/03/24 0500 (!) 148/67 -- -- (!) 48 -- -- --   11/03/24 0345 -- -- -- -- -- -- 86.3 kg (190 lb 3.2 oz)   11/02/24 2045 (!) 153/69 97.7 °F (36.5 °C) Oral 58 18 93 % --     Gen: No distress, pleasant.   HEENT: Normocephalic, atraumatic.   CV: regular rate and rhythm  Resp: No respiratory distress. On room air  Abd: Soft, nondistended, bowel sounds active  Ext: No edema.   Neuro: Alert, oriented, appropriately interactive. Speech fluent  Psych: appropriate mood and affect    Wt Readings from Last 3 Encounters:   11/03/24 86.3 kg (190 lb 3.2 oz)   06/04/18 108.4 kg (239 lb)   01/22/18 111.6 kg (246 lb)       Laboratory data:   Lab Results   Component Value Date    WBC 7.7 11/01/2024    HGB 11.0 (L) 11/01/2024    HCT 33.2 (L) 11/01/2024    MCV 95.0 11/01/2024     11/01/2024       Lab Results   Component Value Date/Time     11/01/2024 06:41 AM    K 4.1 11/01/2024 06:41 AM     11/01/2024 06:41 AM    CO2 22 11/01/2024 06:41 AM    BUN 23 11/01/2024 06:41 AM    CREATININE 1.4 11/01/2024 06:41 AM    GLUCOSE 108 11/01/2024 06:41 AM    CALCIUM 9.4 11/01/2024 06:41 AM        Therapy progress:  Physical therapy:  Bed Mobility:     Sit>supine:  Assistance Level: Modified independent  Skilled Clinical Factors: HOB elevated  Supine>sit:  Assistance Level: Modified independent  Skilled Clinical Factors: HOB elevated  Transfers:  Surface: To chair with arms, To chair without arms, To mat  Additional Factors: Verbal cues, Increased time to complete, Hand placement cues  Device: Walker (trails with RW and without  Check PVR x 3.  IMC if PVR > 200ml or if any volume is > 500 ml.      Sleep: melatonin provided prn.      PPX  DVT: heparin  GI: not indicated     Follow up appointments: PCP    Therapy progress: therapy hold today due to cardiac evaluation   Services: OP PT, OT,ST; 24 hour assistance  DME: [facility]  EDOD: 11/9    Wade Cabello DO  11/3/2024, 8:02 PM

## 2024-11-04 NOTE — PROGRESS NOTES
[facility]: ACUTE REHAB UNIT  SPEECH-LANGUAGE PATHOLOGY      [x] Daily  [] Weekly Care Conference Note  [] Discharge    Patient:Akbar Arias      :1935  MRN:[id number]  Rehab Dx/Hx: Acute CVA (cerebrovascular accident) (HCC) [I63.9]  Bradycardia [R00.1]    Precautions: falls  Home situation: lives with wife. Indep with med management. Wife manages finances.  ST Dx: [] Aphasia  [] Dysarthria  [] Apraxia   [] Oropharyngeal dysphagia [x] Cognitive Impairment  [] Other:   Date of Admit: 10/27/2024  Room #: 0152/0152-01    Current functional status (updated daily):         Pt being seen for : [] Speech/Language Treatment  [] Dysphagia Treatment [x] Cognitive Treatment  [] Other:  Communication: []WFL  [] Aphasia  [] Dysarthria  [] Apraxia  [] Pragmatic Impairment [] Non-verbal  [] Hearing Loss  [x] Other: min expressive / receptive language deficits   Cognition: [] WFL  [] Mild  [x] Moderate  [x] Severe [] Unable to Assess  [] Other:  Memory: [] WFL  [] Mild  [x] Moderate  [x] Severe [] Unable to Assess  [] Other:  Behavior: [x] Alert  [x] Cooperative  [x]  Pleasant  [] Confused  [] Agitated  [] Uncooperative  [] Distractible [] Motivated  [x] Self-Limiting [] Anxious  [] Other:  Endurance:  [x] Adequate for participation in SLP sessions  [] Reduced overall  [] Lethargic  [] Other:  Safety: [] No concerns at this time  [x] Reduced insight into deficits  [x]  Reduced safety awareness [] Not following call light procedures  [] Unable to Assess  [] Other:    Current Diet Order:ADULT ORAL NUTRITION SUPPLEMENT; Breakfast; Diabetic Oral Supplement  ADULT DIET; Regular; 4 carb choices (60 gm/meal)  Swallowing Precautions: general aspiration precautions         Date: 2024      Tx session 1  [phone]  Helene Wagner MA, CCC-SLP Tx session 2  [phone]  Helene Wagner MA, CCC-SLP   Total Timed Code Min 30 30   Total Treatment Minutes 60 60   Individual Treatment Minutes 60 60   Group Treatment Minutes 0 0   Co-Treat  Pt will complete verbal and visual reasoning tasks with 80% acc given min cues   Goal indirectly targeted in goal 2. See above  Did not directly target   Other areas targeted: N/A   N/A   Education:   SLP edu pt and wife re: medication management strategies and WRAPP memory strategies SLP edu pt and wife re: medication management strategies and WRAPP memory strategies   Safety Devices: [x] Call light within reach  [x] Chair alarm activated  [] Bed alarm activated  [x] Other: wife at bedside  [] Call light within reach  [] Chair alarm activated  [] Bed alarm activated  [] Other:    Assessment: Treatment 1:    Pt had much difficulty with executive function tasks such as use of calendar, and math word problems. Pt required maximum cueing to complete tasks. Pt struggled with simple addition and subtraction problems. Pt benefited from tailoring taks around interest such as sports.         Treatment 2:   Pt demonstrated much difficulty with recall tasks, requiring maximum cueing. Pt struggled with more abstract thought organization tasks. Using interests such as incorporating sports teams help keep pt motivated.     Plan: Continue as per plan of care.      Additional Information:     Barriers toward progress: Cognitive deficit, Impulsivity, Limited safety awareness, Decreased motivation, and Limited insight into deficits  Discharge recommendations:  [] Home independently  [] Home with assistance [x]  24 hour supervision  [] ECF [x] Other: see PT/OT recs  Continued Tx Upon Discharge: ? [x] Yes [] No [] TBD based on progress while on ARU [] Vital Stim indicated [] Other:   Estimated discharge date: TBD    Interventions used this date:  [] Speech/Language Treatment  [] Instruction in HEP [] Group [] Dysphagia Treatment [x] Cognitive Treatment   [] Other:      Total Time Breakdown / Charges    Time in Time out Total Time / units   Cognitive Tx 1230  1430 1330  1500 30 min / 2 units   30 mins / 2 units   Speech Tx

## 2024-11-04 NOTE — PROGRESS NOTES
Akbar Arias  11/4/2024  [id number]    Chief Complaint: Acute CVA (cerebrovascular accident) (HCC)    Subjective:   No acute events overnight. Today Ed is seen with his wife present. He reports feeling improved. He reports having several bowel movements over the weekend. He denies other acute complaints at this time.     Personally reviewed labs.     ROS: denies f/c, n/v, cp, sob    Objective:  Patient Vitals for the past 24 hrs:   BP Temp Temp src Pulse Resp SpO2   11/04/24 0812 (!) 167/73 97.6 °F (36.4 °C) Oral 50 16 96 %   11/04/24 0557 (!) 163/71 -- -- -- -- --   11/03/24 2015 (!) 154/65 97.8 °F (36.6 °C) Oral (!) 45 16 95 %     Gen: No distress, pleasant. Supine in bed  HEENT: Normocephalic, atraumatic.   CV: regular rate and rhythm  Resp: No respiratory distress. On room air  Abd: Soft, nondistended, bowel sounds active, nontender  Ext: No edema.   Neuro: Alert, oriented, appropriately interactive. Speech fluent  Psych: appropriate mood and affect    Wt Readings from Last 3 Encounters:   11/03/24 86.3 kg (190 lb 3.2 oz)   06/04/18 108.4 kg (239 lb)   01/22/18 111.6 kg (246 lb)       Laboratory data:   Lab Results   Component Value Date    WBC 4.7 11/04/2024    HGB 11.4 (L) 11/04/2024    HCT 34.4 (L) 11/04/2024    MCV 94.3 11/04/2024     11/04/2024       Lab Results   Component Value Date/Time     11/04/2024 06:41 AM    K 4.2 11/04/2024 06:41 AM     11/04/2024 06:41 AM    CO2 24 11/04/2024 06:41 AM    BUN 22 11/04/2024 06:41 AM    CREATININE 1.6 11/04/2024 06:41 AM    GLUCOSE 104 11/04/2024 06:41 AM    CALCIUM 9.2 11/04/2024 06:41 AM        Therapy progress:  Physical therapy:  Bed Mobility:     Sit>supine:  Assistance Level: Modified independent  Skilled Clinical Factors: HOB elevated  Supine>sit:  Assistance Level: Modified independent  Skilled Clinical Factors: HOB elevated  Transfers:  Surface: To chair with arms, To chair without arms, To mat  Additional Factors: Verbal cues,  ROME  - needs sleep study as outpatient     Bowels: adjust medications as needed for regular bowel movements      Bladder: Check PVR x 3.  IMC if PVR > 200ml or if any volume is > 500 ml.      Sleep: melatonin provided prn.      PPX  DVT: heparin  GI: not indicated     Follow up appointments: PCP    Therapy progress: pending therapies today  Services: OP PT, OT,ST; 24 hour assistance  DME: [facility]  EDOD: 11/9    Geri Fraire MD  11/4/2024, 10:52 AM

## 2024-11-05 LAB
GLUCOSE BLD-MCNC: 110 MG/DL (ref 70–99)
GLUCOSE BLD-MCNC: 127 MG/DL (ref 70–99)
GLUCOSE BLD-MCNC: 128 MG/DL (ref 70–99)
GLUCOSE BLD-MCNC: 94 MG/DL (ref 70–99)
PERFORMED ON: ABNORMAL
PERFORMED ON: NORMAL

## 2024-11-05 PROCEDURE — 97110 THERAPEUTIC EXERCISES: CPT

## 2024-11-05 PROCEDURE — 97112 NEUROMUSCULAR REEDUCATION: CPT

## 2024-11-05 PROCEDURE — 97530 THERAPEUTIC ACTIVITIES: CPT

## 2024-11-05 PROCEDURE — 1280000000 HC REHAB R&B

## 2024-11-05 PROCEDURE — 97129 THER IVNTJ 1ST 15 MIN: CPT

## 2024-11-05 PROCEDURE — 6370000000 HC RX 637 (ALT 250 FOR IP): Performed by: STUDENT IN AN ORGANIZED HEALTH CARE EDUCATION/TRAINING PROGRAM

## 2024-11-05 PROCEDURE — 97535 SELF CARE MNGMENT TRAINING: CPT

## 2024-11-05 PROCEDURE — 97116 GAIT TRAINING THERAPY: CPT

## 2024-11-05 PROCEDURE — 97130 THER IVNTJ EA ADDL 15 MIN: CPT

## 2024-11-05 PROCEDURE — 6360000002 HC RX W HCPCS: Performed by: STUDENT IN AN ORGANIZED HEALTH CARE EDUCATION/TRAINING PROGRAM

## 2024-11-05 RX ADMIN — HYDRALAZINE HYDROCHLORIDE 25 MG: 25 TABLET ORAL at 06:46

## 2024-11-05 RX ADMIN — ASPIRIN 325 MG: 325 TABLET, COATED ORAL at 08:09

## 2024-11-05 RX ADMIN — VALSARTAN 80 MG: 80 TABLET, FILM COATED ORAL at 20:34

## 2024-11-05 RX ADMIN — METFORMIN HYDROCHLORIDE 500 MG: 500 TABLET ORAL at 08:09

## 2024-11-05 RX ADMIN — CLOPIDOGREL BISULFATE 75 MG: 75 TABLET ORAL at 08:09

## 2024-11-05 RX ADMIN — SENNOSIDES AND DOCUSATE SODIUM 1 TABLET: 50; 8.6 TABLET ORAL at 20:33

## 2024-11-05 RX ADMIN — HEPARIN SODIUM 5000 UNITS: 5000 INJECTION INTRAVENOUS; SUBCUTANEOUS at 06:47

## 2024-11-05 RX ADMIN — HEPARIN SODIUM 5000 UNITS: 5000 INJECTION INTRAVENOUS; SUBCUTANEOUS at 20:36

## 2024-11-05 RX ADMIN — HEPARIN SODIUM 5000 UNITS: 5000 INJECTION INTRAVENOUS; SUBCUTANEOUS at 13:47

## 2024-11-05 RX ADMIN — HYDRALAZINE HYDROCHLORIDE 25 MG: 25 TABLET ORAL at 20:34

## 2024-11-05 RX ADMIN — ATORVASTATIN CALCIUM 40 MG: 40 TABLET, FILM COATED ORAL at 20:34

## 2024-11-05 RX ADMIN — AMLODIPINE BESYLATE 10 MG: 5 TABLET ORAL at 08:09

## 2024-11-05 RX ADMIN — VALSARTAN 80 MG: 80 TABLET, FILM COATED ORAL at 08:09

## 2024-11-05 RX ADMIN — Medication 5 MG: at 20:33

## 2024-11-05 RX ADMIN — METFORMIN HYDROCHLORIDE 500 MG: 500 TABLET ORAL at 16:44

## 2024-11-05 ASSESSMENT — PAIN SCALES - GENERAL
PAINLEVEL_OUTOF10: 0
PAINLEVEL_OUTOF10: 0

## 2024-11-05 NOTE — PROGRESS NOTES
Occupational Therapy  Facility/Department: [facility]  Rehabilitation Occupational Therapy Daily Treatment Note    Date: 24  Patient Name: Akbar Arias       Room: 0152/0152-01  MRN: [id number]  Account: [id number]   : 1935  (88 y.o.) Gender: male            Pt was bathed during OT session this date. Pt was Showered with soap/water with Supervision.          Past Medical History:  has a past medical history of Hyperlipidemia and Hypertension.  Past Surgical History:   has a past surgical history that includes back surgery (); eye surgery (Right, 2013); and eye surgery (Left, 13).    Restrictions  Restrictions/Precautions: Fall Risk, General Precautions  Other position/activity restrictions: up with assistance, poor L/R distinction, impulsive    Subjective  Subjective: Pt in bed, pleasant, no pain, agreeable to OT session, ADL requested daily per spouse, /68, HR 56, O2 sats 95% on RA.  Restrictions/Precautions: Fall Risk;General Precautions             Objective     Cognition  Overall Cognitive Status: Exceptions  Arousal/Alertness: Appears intact  Following Commands: Follows one step commands with repetition;Follows one step commands with increased time;Inconsistently follows commands  Attention Span: Attends with cues to redirect;Difficulty dividing attention;Difficulty attending to directions  Memory: Decreased recall of recent events;Decreased short term memory;Decreased recall of biographical Information  Safety Judgement: Decreased awareness of need for assistance;Decreased awareness of need for safety  Problem Solving: Assistance required to correct errors made;Assistance required to identify errors made;Assistance required to implement solutions;Assistance required to generate solutions;Decreased awareness of errors  Insights: Decreased awareness of deficits  Initiation: Requires cues for all  Sequencing: Requires cues for some  Orientation  Overall Orientation Status:  training;Strengthening;Self-Care / ADL;Safety education & training;Equipment evaluation, education, & procurement;Patient/Caregiver education & training;ROM;Neuromuscular re-education;Coordination training;Cognitive/Perceptual training    Goals  Short Term Goals  Time Frame for Short Term Goals: 6 days- 11/1/24  Short Term Goal 1: Pt will complete toileting with SPV. GOAL MET 11/5/24 Pt completed toileting with SPV.  Short Term Goal 2: Pt will perform full body dressing with SPV. GOAL MET 11/4/24 Pt performed full body dressing with SPV.  Short Term Goal 3: Pt will complete full body bathing with SBA. GOAL MET 10/31/24 Pt completed full body bathing with SBA.  Short Term Goal 4: Pt will perform grooming at sink with SPV. GOAL MET 11/5/24 Pt performed grooming with SPV.  Long Term Goals  Time Frame for Long Term Goals : 2 weeks- 11/9/24  Long Term Goal 1: Pt will complete functional transfers with mod I.  Long Term Goal 2: Pt will perform toileting with mod I.  Long Term Goal 3: Pt will complete full body dressing with mod I.  Long Term Goal 4: Pt will perform full body bathing with SPV. GOAL MET 11/5/24 Pt performed full body bathing with SPV.  Long Term Goal 5: Pt will complete grooming at sink with mod I.    Therapy Time   Individual Concurrent Group Co-treatment   Time In 1030         Time Out 1130         Minutes 60         Timed Code Treatment Minutes: 60 Minutes       Rudolph Delgado, OT

## 2024-11-05 NOTE — PROGRESS NOTES
[facility]: ACUTE REHAB UNIT  SPEECH-LANGUAGE PATHOLOGY      [x] Daily  [] Weekly Care Conference Note  [] Discharge    Patient:Akbar Arias      :1935  MRN:[id number]  Rehab Dx/Hx: Acute CVA (cerebrovascular accident) (HCC) [I63.9]  Bradycardia [R00.1]    Precautions: falls  Home situation: lives with wife. Indep with med management. Wife manages finances.  ST Dx: [] Aphasia  [] Dysarthria  [] Apraxia   [] Oropharyngeal dysphagia [x] Cognitive Impairment  [] Other:   Date of Admit: 10/27/2024  Room #: 0152/0152-01    Current functional status (updated daily):         Pt being seen for : [] Speech/Language Treatment  [] Dysphagia Treatment [x] Cognitive Treatment  [] Other:  Communication: []WFL  [] Aphasia  [] Dysarthria  [] Apraxia  [] Pragmatic Impairment [] Non-verbal  [] Hearing Loss  [x] Other: min expressive / receptive language deficits   Cognition: [] WFL  [] Mild  [x] Moderate  [x] Severe [] Unable to Assess  [] Other:  Memory: [] WFL  [] Mild  [x] Moderate  [x] Severe [] Unable to Assess  [] Other:  Behavior: [x] Alert  [x] Cooperative  [x]  Pleasant  [] Confused  [] Agitated  [] Uncooperative  [] Distractible [] Motivated  [x] Self-Limiting [] Anxious  [] Other:  Endurance:  [x] Adequate for participation in SLP sessions  [] Reduced overall  [] Lethargic  [] Other:  Safety: [] No concerns at this time  [x] Reduced insight into deficits  [x]  Reduced safety awareness [] Not following call light procedures  [] Unable to Assess  [] Other:    Current Diet Order:ADULT ORAL NUTRITION SUPPLEMENT; Breakfast; Diabetic Oral Supplement  ADULT DIET; Regular; 4 carb choices (60 gm/meal)  Swallowing Precautions: general aspiration precautions         Date: 2024      Tx session 1  1400 - 1500 Tx session 2  All tx needs met in session 1   Total Timed Code Min 60 0   Total Treatment Minutes 60 0   Individual Treatment Minutes 60 0   Group Treatment Minutes 0 0   Co-Treat Minutes 0 0   Variance/Reason:  N/A     Pain Denies     Pain Intervention [] RN notified  [] Repositioned  [] Intervention offered and patient declined  [x] N/A  [] Other: [] RN notified  [] Repositioned  [] Intervention offered and patient declined  [x] N/A  [] Other:   Subjective     Pt alert and cooperative, agreeable to tx. Pt upright in bedside chair. Pt's wife, son, and grandson at bedside.    Objective:  Goals     Short Term Goals  Time Frame for Short Term Goals: extend goals through 11/08/24    Goal 1: Pt will complete graded recall tasks using compensatory strategies with 80% acc given min cues    SLP edu pt re: recall strategies - writing info down, repetition, visualization.    Pt used repetition and visualization strategy to recall 5 previous [employer] players (given by SLP)  -immediate recall: pt recalled 4/5 items; +1 given min cues  -10 min delay: pt recalled 5/5 indep  -30 min delay: pt recalled 4/5 items indep; +1 given min cues      Goal 2: Pt will complete executive function tasks (e.g. meds, time, money, etc) with 80% acc given min cues   Pt and wife report that pt's grandson is going to take over medications at d/c. Grandson is going to fill the pill organizer, but pt reports he will remember to take them. SLP edu pt re: strategies to remember to take meds - setting an alarm.       Goal 3: Pt will complete problem solving and thought organization tasks with 80% acc given min cues   Pt able to verbalize he would call 911 in an emergency given min cues    Divergent naming task related to sports  -pt completed with 50% acc indep, improved to 75% acc given min cues     4-step sequencing   -pt completed with 56% acc given min cues, improved to 100% acc given mod-max cues       Goal 4: Pt will complete verbal and visual reasoning tasks with 80% acc given min cues   SLP provided extensive edu to pt and family re: rationale of all tx tasks. Pt with reduced reasoning skills, thus needs extensive education in order to comprehend reasoning behind

## 2024-11-05 NOTE — PROGRESS NOTES
Physical Therapy  Facility/Department: [facility]  Rehabilitation Physical Therapy Treatment Note    NAME: Akbar Arias  : 1935 (88 y.o.)  MRN: [id number]  CODE STATUS: Full Code    Date of Service: 24       Restrictions:  Restrictions/Precautions: Fall Risk, General Precautions  Position Activity Restriction  Other position/activity restrictions: up with assistance, poor L/R distinction, impulsive     SUBJECTIVE  Subjective  Subjective: pt motivated to participate, requesting to \"ride bike\"   Pain: none reported throughout session        Post Treatment Pain Screening : none          OBJECTIVE  Cognition  Overall Cognitive Status: Exceptions  Arousal/Alertness: Appears intact  Following Commands: Follows one step commands with repetition;Follows one step commands with increased time;Inconsistently follows commands  Attention Span: Attends with cues to redirect;Difficulty dividing attention;Difficulty attending to directions  Memory: Decreased recall of recent events;Decreased short term memory;Decreased recall of biographical Information  Safety Judgement: Decreased awareness of need for assistance;Decreased awareness of need for safety  Problem Solving: Assistance required to correct errors made;Assistance required to identify errors made;Assistance required to implement solutions;Assistance required to generate solutions;Decreased awareness of errors  Insights: Decreased awareness of deficits  Initiation: Requires cues for all  Sequencing: Requires cues for some  Orientation  Overall Orientation Status: Within Functional Limits  Orientation Level: Oriented to place;Disoriented to time;Disoriented to situation;Oriented to person    Functional Mobility  Balance  Sitting Balance: Modified independent   Standing Balance: Contact guard assistance  Standing Balance  Activity: BITS 2 sets of 5 mins each, see neuro section  Transfers  Surface: From chair with arms;To chair with arms;To chair without  place    EDUCATION  Education  Education Given To: Patient  Education Provided: Role of Therapy;Mobility Training;Plan of Care;Precautions;Safety;Transfer Training;Equipment;Energy Conservation  Education Provided Comments: goals and safe progression of mobility. Educated on schedule and care conferences on ARU.  Education Method: Demonstration;Verbal  Barriers to Learning: Cognition  Education Outcome: Verbalized understanding;Continued education needed        Therapy Time   Individual Concurrent Group Co-treatment   Time In 1230         Time Out 1330         Minutes 60           Timed Code Treatment Minutes: 60 Minutes       Gogo [place] PT, 11/05/24 at 3:32 PM

## 2024-11-05 NOTE — PATIENT CARE CONFERENCE
[facility]  Inpatient Rehabilitation  Weekly Team Conference Note    Date: 2024  Patient Name: Akbar Arias        MRN: [id number]    : 1935  (88 y.o.)  Gender: male   Referring Practitioner: Dr. Fraire  Diagnosis: CVA      Interventions to be utilized toward barriers to discharge, per discipline:  NURSING  Nursing observed barriers to dc: Decreased sensation, Upper extremity weakness, and Lower extremity weakness  Nursing interventions: Assist with ADLs, medication and diabetic management, vitals monitoring.  Family Education: No  Fall Risk:  Yes    Stay with me?: Yes    PHYSICAL THERAPY  Physical therapy observed barriers to dc: proprioception, safety, cognition, fall risk   Baseline: IND   Current level: CGA for ambulation and transfers    Barriers to DC: proprioception, safety, cognition, fall risk   Needs in order to achieve dc home/next level of care: 24hr A, OP PT, no DME (pt owns rollator and standard walker)       Physical therapy interventions:   Current Treatment Recommendations: Strengthening, Balance training, ROM, Functional mobility training, Transfer training, Cognitive/Perceptual training, Endurance training, Gait training, Stair training, Neuromuscular re-education, Cognitive reorientation, Home exercise program, Safety education & training, Patient/Caregiver education & training, Equipment evaluation, education, & procurement, Positioning, Therapeutic activities    PT Goals:            Short Term Goals  Time Frame for Short Term Goals: 1 week 2024  Short Term Goal 1: Pt will complete bed mobility with IND-- MET  Short Term Goal 2: Pt will complete functional t/f with LRAD with SBA-- MET  Short Term Goal 3: Pt will ambulate >150' with LRAD with SBA -- NOT MET CGA due to poor safety awareness  Short Term Goal 4: Pt will ascend/descend 12 steps with HR with SBA --progressing 10/31 16 steps with CGA            Long Term Goals  Time Frame for  POC.  Activity Tolerance: Patient tolerated treatment well  Discharge Recommendations: 24 hour supervision or assist;Outpatient OT  OT Equipment Recommendations  Equipment Needed: No       SPEECH THERAPY:  Speech therapy observed barriers to dc:               Baseline:  lives with wife. Indep with med management. Wife manages finances.               Current level: moderate cognitive deficits,  mild receptive and expressive language deficits               Barriers to DC: reduced insight into deficits, reduced safety               Needs in order to achieve dc home/next level of care: carryover of compensatory strategies, 24 hr supervision, assist with med management      Speech Therapy interventions:  Dysphagia:  N/A  Speech/Language/Cognition: Compensatory strategy training and carryover, recall/STM, problem solving, reasoning, exec function, thought organization, attention.      Speech/Language/Cog Goals:  Time Frame for Long Term Goals: 14 Days (11/09/24)  Goal 1: Pt will improve cognitive-linguistic abilities to promote safe and independent return home PLOF - 11/05 - ongoing, slowly progressing         Short Term Goals  Time Frame for Short Term Goals:  extend goals through 11/08/24  Goal 1: Pt will complete graded recall tasks using compensatory strategies with 80% acc given min cues - 11/05 - ongoing, slowly progressing   Goal 2: Pt will complete executive function tasks (e.g. meds, time, money, etc) with 80% acc given min cues - 11/05 - ongoing, slowly progressing   Goal 3: Pt will complete problem solving and thought organization tasks with 80% acc given min cues - 11/05 - ongoing, slowly progressing   Goal 4: Pt will complete verbal and visual reasoning tasks with 80% acc given min cues - 11/05 - ongoing, slowly progressing       ST Assessment:   Pt alert and cooperative, agreeable to tx. SLP provided extensive edu to pt and family re: rationale of all tx tasks. Pt with reduced reasoning skills, thus needs

## 2024-11-05 NOTE — PROGRESS NOTES
Akbar Arias  11/5/2024  [id number]    Chief Complaint: Acute CVA (cerebrovascular accident) (HCC)    Subjective:   No acute events overnight. Today Ed is seen with his wife and family present. He reports feeling well and states that he is sleeping well at night. His last bowel movement was last night. He reports continued weakness in his leg.     Personally reviewed labs.     ROS: denies f/c, n/v, cp, sob    Objective:  Patient Vitals for the past 24 hrs:   BP Temp Temp src Pulse Resp SpO2   11/05/24 0806 130/65 98.1 °F (36.7 °C) Oral 61 16 97 %   11/05/24 0646 (!) 138/47 -- -- -- -- --   11/04/24 2033 (!) 152/71 98.4 °F (36.9 °C) Oral 62 16 94 %   11/04/24 1400 (!) 113/58 -- -- 80 -- --     Gen: No distress, pleasant.   HEENT: Normocephalic, atraumatic.   CV: RRR  Resp: No respiratory distress. Lungs CTAB  Abd: Soft, nondistended  Ext: No edema.   Neuro: Alert, oriented, appropriately interactive. Speech fluent  Psych: appropriate mood and affect    Wt Readings from Last 3 Encounters:   11/03/24 86.3 kg (190 lb 3.2 oz)   06/04/18 108.4 kg (239 lb)   01/22/18 111.6 kg (246 lb)       Laboratory data:   Lab Results   Component Value Date    WBC 4.7 11/04/2024    HGB 11.4 (L) 11/04/2024    HCT 34.4 (L) 11/04/2024    MCV 94.3 11/04/2024     11/04/2024       Lab Results   Component Value Date/Time     11/04/2024 06:41 AM    K 4.2 11/04/2024 06:41 AM     11/04/2024 06:41 AM    CO2 24 11/04/2024 06:41 AM    BUN 22 11/04/2024 06:41 AM    CREATININE 1.6 11/04/2024 06:41 AM    GLUCOSE 104 11/04/2024 06:41 AM    CALCIUM 9.2 11/04/2024 06:41 AM        Therapy progress:  Physical therapy:  Bed Mobility:     Sit>supine:  Assistance Level: Modified independent  Skilled Clinical Factors: HOB elevated  Supine>sit:  Assistance Level: Modified independent  Skilled Clinical Factors: HOB elevated  Transfers:  Surface: To chair without arms, From chair without arms, From chair with arms, To chair with  arms  Additional Factors: Verbal cues, Increased time to complete, Hand placement cues  Device: Walker  Sit>stand:  Assistance Level: Stand by assist, Contact guard assist  Skilled Clinical Factors: cues for safe hand placement, with RW; progress to SBA and demonstrates understanding of hand placement post education with no further cues  Stand>sit:  Assistance Level: Stand by assist, Contact guard assist  Bed<>chair  Technique: Stand step  Assistance Level: Contact guard assist  Stand Pivot:     Lateral transfer:     Car transfer:     Ambulation:  Surface: Level surface  Device: Rolling walker  Distance: 200 ft CGA due to impulsivity and directional challenges, 120 for cone activity, 150 ft  Activity: Within Room, Within Unit, Other (Comment)  Activity Comments: visual scanning for cones R and L and placing them back around hallway, CGA  Additional Factors: Verbal cues, Hand placement cues, Increased time to complete  Assistance Level: Contact guard assist  Gait Deviations: Wide base of support, Path deviations  Skilled Clinical Factors: Pt requires max cues for pathfinding and unable to follow directions to turn Left and Right, cues for pacing/safety with speed  Stairs:  Stair Height: 6''  Device: Bilateral handrails  Number of Stairs: 16  Assistance Level: Contact guard assist  Skilled Clinical Factors: reciprocal pattern , occ cues for RUE management on rail; SBA when using non-reciprocal pattern  Curb:     Wheelchair:       Occupational therapy:   Feeding     Grooming/Oral Hygiene  Assistance Level: Stand by assist  Skilled Clinical Factors: standing at sink to brush teeth and comb hair, min VCs for locating items on sink and initiation of task  UE Bathing  Assistance Level: Supervision  Skilled Clinical Factors: mod VCs for sequencing through bathing  LE Bathing  Assistance Level: Stand by assist  Skilled Clinical Factors: min VCs for sequencing, SBA in stance to bathe buttocks, cues to sit back down after

## 2024-11-06 LAB
ANION GAP SERPL CALCULATED.3IONS-SCNC: 10 MMOL/L (ref 3–16)
BASOPHILS # BLD: 0.1 K/UL (ref 0–0.2)
BASOPHILS NFR BLD: 1.4 %
BUN SERPL-MCNC: 31 MG/DL (ref 7–20)
CALCIUM SERPL-MCNC: 9.4 MG/DL (ref 8.3–10.6)
CHLORIDE SERPL-SCNC: 106 MMOL/L (ref 99–110)
CO2 SERPL-SCNC: 21 MMOL/L (ref 21–32)
CREAT SERPL-MCNC: 1.6 MG/DL (ref 0.8–1.3)
DEPRECATED RDW RBC AUTO: 13.6 % (ref 12.4–15.4)
EOSINOPHIL # BLD: 0.2 K/UL (ref 0–0.6)
EOSINOPHIL NFR BLD: 3.6 %
GFR SERPLBLD CREATININE-BSD FMLA CKD-EPI: 41 ML/MIN/{1.73_M2}
GLUCOSE BLD-MCNC: 107 MG/DL (ref 70–99)
GLUCOSE BLD-MCNC: 111 MG/DL (ref 70–99)
GLUCOSE BLD-MCNC: 118 MG/DL (ref 70–99)
GLUCOSE BLD-MCNC: 122 MG/DL (ref 70–99)
GLUCOSE SERPL-MCNC: 108 MG/DL (ref 70–99)
HCT VFR BLD AUTO: 34.2 % (ref 40.5–52.5)
HGB BLD-MCNC: 11.2 G/DL (ref 13.5–17.5)
LYMPHOCYTES # BLD: 1.2 K/UL (ref 1–5.1)
LYMPHOCYTES NFR BLD: 25.4 %
MCH RBC QN AUTO: 31.5 PG (ref 26–34)
MCHC RBC AUTO-ENTMCNC: 32.9 G/DL (ref 31–36)
MCV RBC AUTO: 95.8 FL (ref 80–100)
MONOCYTES # BLD: 0.5 K/UL (ref 0–1.3)
MONOCYTES NFR BLD: 11.2 %
NEUTROPHILS # BLD: 2.7 K/UL (ref 1.7–7.7)
NEUTROPHILS NFR BLD: 58.4 %
PERFORMED ON: ABNORMAL
PLATELET # BLD AUTO: 272 K/UL (ref 135–450)
PMV BLD AUTO: 9.2 FL (ref 5–10.5)
POTASSIUM SERPL-SCNC: 4.2 MMOL/L (ref 3.5–5.1)
RBC # BLD AUTO: 3.56 M/UL (ref 4.2–5.9)
SODIUM SERPL-SCNC: 137 MMOL/L (ref 136–145)
WBC # BLD AUTO: 4.7 K/UL (ref 4–11)

## 2024-11-06 PROCEDURE — 97130 THER IVNTJ EA ADDL 15 MIN: CPT

## 2024-11-06 PROCEDURE — 6370000000 HC RX 637 (ALT 250 FOR IP): Performed by: STUDENT IN AN ORGANIZED HEALTH CARE EDUCATION/TRAINING PROGRAM

## 2024-11-06 PROCEDURE — 97112 NEUROMUSCULAR REEDUCATION: CPT

## 2024-11-06 PROCEDURE — 6360000002 HC RX W HCPCS: Performed by: STUDENT IN AN ORGANIZED HEALTH CARE EDUCATION/TRAINING PROGRAM

## 2024-11-06 PROCEDURE — 85025 COMPLETE CBC W/AUTO DIFF WBC: CPT

## 2024-11-06 PROCEDURE — 36415 COLL VENOUS BLD VENIPUNCTURE: CPT

## 2024-11-06 PROCEDURE — 97129 THER IVNTJ 1ST 15 MIN: CPT

## 2024-11-06 PROCEDURE — 80048 BASIC METABOLIC PNL TOTAL CA: CPT

## 2024-11-06 PROCEDURE — 97530 THERAPEUTIC ACTIVITIES: CPT

## 2024-11-06 PROCEDURE — 97110 THERAPEUTIC EXERCISES: CPT

## 2024-11-06 PROCEDURE — 97535 SELF CARE MNGMENT TRAINING: CPT

## 2024-11-06 PROCEDURE — 97116 GAIT TRAINING THERAPY: CPT

## 2024-11-06 PROCEDURE — 1280000000 HC REHAB R&B

## 2024-11-06 RX ADMIN — HEPARIN SODIUM 5000 UNITS: 5000 INJECTION INTRAVENOUS; SUBCUTANEOUS at 14:49

## 2024-11-06 RX ADMIN — VALSARTAN 80 MG: 80 TABLET, FILM COATED ORAL at 10:00

## 2024-11-06 RX ADMIN — METFORMIN HYDROCHLORIDE 500 MG: 500 TABLET ORAL at 17:41

## 2024-11-06 RX ADMIN — POLYETHYLENE GLYCOL 3350 17 G: 17 POWDER, FOR SOLUTION ORAL at 10:00

## 2024-11-06 RX ADMIN — HYDRALAZINE HYDROCHLORIDE 25 MG: 25 TABLET ORAL at 06:51

## 2024-11-06 RX ADMIN — VALSARTAN 80 MG: 80 TABLET, FILM COATED ORAL at 20:00

## 2024-11-06 RX ADMIN — ATORVASTATIN CALCIUM 40 MG: 40 TABLET, FILM COATED ORAL at 20:01

## 2024-11-06 RX ADMIN — LACTULOSE 20 G: 20 SOLUTION ORAL at 14:49

## 2024-11-06 RX ADMIN — METFORMIN HYDROCHLORIDE 500 MG: 500 TABLET ORAL at 09:59

## 2024-11-06 RX ADMIN — HYDRALAZINE HYDROCHLORIDE 25 MG: 25 TABLET ORAL at 20:00

## 2024-11-06 RX ADMIN — Medication 5 MG: at 20:00

## 2024-11-06 RX ADMIN — CLOPIDOGREL BISULFATE 75 MG: 75 TABLET ORAL at 10:00

## 2024-11-06 RX ADMIN — ASPIRIN 325 MG: 325 TABLET, COATED ORAL at 10:00

## 2024-11-06 RX ADMIN — HEPARIN SODIUM 5000 UNITS: 5000 INJECTION INTRAVENOUS; SUBCUTANEOUS at 06:51

## 2024-11-06 RX ADMIN — HEPARIN SODIUM 5000 UNITS: 5000 INJECTION INTRAVENOUS; SUBCUTANEOUS at 20:01

## 2024-11-06 RX ADMIN — LACTULOSE 20 G: 20 SOLUTION ORAL at 20:01

## 2024-11-06 RX ADMIN — LACTULOSE 20 G: 20 SOLUTION ORAL at 10:00

## 2024-11-06 RX ADMIN — AMLODIPINE BESYLATE 10 MG: 5 TABLET ORAL at 10:00

## 2024-11-06 RX ADMIN — SENNOSIDES AND DOCUSATE SODIUM 1 TABLET: 50; 8.6 TABLET ORAL at 20:01

## 2024-11-06 ASSESSMENT — PAIN SCALES - GENERAL
PAINLEVEL_OUTOF10: 0

## 2024-11-06 NOTE — PROGRESS NOTES
Akbar Arias  11/6/2024  [id number]    Chief Complaint: Acute CVA (cerebrovascular accident) (HCC)    Subjective:   No acute events overnight. Today Ed is seen without family present. He reports sleeping well. He feels that he is progressing with therapies. He denies acute complaints at this time.     Personally reviewed labs.     ROS: denies f/c, n/v, cp, sob    Objective:  Patient Vitals for the past 24 hrs:   BP Temp Temp src Pulse Resp SpO2   11/06/24 0957 (!) 147/63 97.5 °F (36.4 °C) Oral 56 16 93 %   11/06/24 0651 (!) 138/58 -- -- -- -- --   11/05/24 2032 (!) 144/69 97.4 °F (36.3 °C) Oral 55 16 100 %   11/05/24 1343 (!) 107/53 -- -- 60 -- --     Gen: No distress, pleasant.   HEENT: Normocephalic, atraumatic.   CV: regular rate and rhythm  Resp: No respiratory distress. On room air  Abd: Soft, nondistended  Ext: No edema.   Neuro: Alert, oriented, appropriately interactive. Speech fluent  Psych: appropriate mood and affect    Wt Readings from Last 3 Encounters:   11/03/24 86.3 kg (190 lb 3.2 oz)   06/04/18 108.4 kg (239 lb)   01/22/18 111.6 kg (246 lb)       Laboratory data:   Lab Results   Component Value Date    WBC 4.7 11/06/2024    HGB 11.2 (L) 11/06/2024    HCT 34.2 (L) 11/06/2024    MCV 95.8 11/06/2024     11/06/2024       Lab Results   Component Value Date/Time     11/06/2024 06:30 AM    K 4.2 11/06/2024 06:30 AM     11/06/2024 06:30 AM    CO2 21 11/06/2024 06:30 AM    BUN 31 11/06/2024 06:30 AM    CREATININE 1.6 11/06/2024 06:30 AM    GLUCOSE 108 11/06/2024 06:30 AM    CALCIUM 9.4 11/06/2024 06:30 AM        Therapy progress:  Physical therapy:  Bed Mobility:     Sit>supine:  Assistance Level: Modified independent  Skilled Clinical Factors: HOB elevated  Supine>sit:  Assistance Level: Modified independent  Skilled Clinical Factors: HOB elevated  Transfers:  Surface: From chair with arms, To chair with arms, To chair without arms, From chair without arms  Additional Factors: Verbal  with SPV  LE Dressing  Assistance Level: Supervision  Skilled Clinical Factors: standing to manage pants/underwear over hips, no VCs for sequencing this date  Putting On/Taking Off Footwear  Assistance Level: Set-up  Skilled Clinical Factors: to don/doff B socks and don shoes  Toileting  Assistance Level: Supervision  Skilled Clinical Factors: seated on toilet to urinate prior to shower    Speech therapy:    ADULT ORAL NUTRITION SUPPLEMENT; Breakfast; Diabetic Oral Supplement  ADULT DIET; Regular; 4 carb choices (60 gm/meal)    Body mass index is 25.8 kg/m².    Assessment and Plan:  Akbar Arias is an 88 year old male with a past medical history significant for HTN and HLD who presented to [facility] on 10/[facility]/24 with 3-4 weeks of RUE weakness and decreased responsiveness with aphasia, found to have left frontoparietal stroke. He was admitted to [facility] on 10/27 due to functional deficits below his baseline.      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   - external heart monitor per Cardiology    Constipation  - continue bowel regimen    Prediabetes  - HbA1c 5.9  - metformin   - SSI     CKD Stage 3  - unclear baseline Cr  - 1.4 on discharge from   - Cr relatively stable  - follow up with PCP     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  IMC if PVR > 200ml or if any volume is > 500 ml.      Sleep: melatonin provided prn.      PPX  DVT: heparin  GI: not indicated     Follow up appointments: PCP    Therapy progress: demonstrated

## 2024-11-06 NOTE — CARE COORDINATION
Weekly team care conference with interdisciplinary team on: 11/6/24    Chart reviewed for length of stay. IP day #  10 Unit: ARU   Diagnosis and current status as per MD progress: Acute CVA  Consultants Following: Physical Medicine  Planned Discharge Date: 11/9/24  Durable medical equipment needed: RW  Discharge Plan: Home with OP therapy    Sara Quach RN

## 2024-11-06 NOTE — PROGRESS NOTES
[facility]: ACUTE REHAB UNIT  SPEECH-LANGUAGE PATHOLOGY      [x] Daily  [] Weekly Care Conference Note  [] Discharge    Patient:Akbar Arias      :1935  MRN:[id number]  Rehab Dx/Hx: Acute CVA (cerebrovascular accident) (HCC) [I63.9]  Bradycardia [R00.1]    Precautions: falls  Home situation: lives with wife. Indep with med management. Wife manages finances.  ST Dx: [] Aphasia  [] Dysarthria  [] Apraxia   [] Oropharyngeal dysphagia [x] Cognitive Impairment  [] Other:   Date of Admit: 10/27/2024  Room #: 0152/0152-01    Current functional status (updated daily):         Pt being seen for : [] Speech/Language Treatment  [] Dysphagia Treatment [x] Cognitive Treatment  [] Other:  Communication: []WFL  [] Aphasia  [] Dysarthria  [] Apraxia  [] Pragmatic Impairment [] Non-verbal  [] Hearing Loss  [x] Other: min expressive / receptive language deficits   Cognition: [] WFL  [] Mild  [x] Moderate  [x] Severe [] Unable to Assess  [] Other:  Memory: [] WFL  [] Mild  [x] Moderate  [x] Severe [] Unable to Assess  [] Other:  Behavior: [x] Alert  [x] Cooperative  [x]  Pleasant  [] Confused  [] Agitated  [] Uncooperative  [] Distractible [] Motivated  [x] Self-Limiting [] Anxious  [] Other:  Endurance:  [x] Adequate for participation in SLP sessions  [] Reduced overall  [] Lethargic  [] Other:  Safety: [] No concerns at this time  [x] Reduced insight into deficits  [x]  Reduced safety awareness [] Not following call light procedures  [] Unable to Assess  [] Other:    Current Diet Order:ADULT ORAL NUTRITION SUPPLEMENT; Breakfast; Diabetic Oral Supplement  ADULT DIET; Regular; 4 carb choices (60 gm/meal)  Swallowing Precautions: general aspiration precautions         Date: 2024      Tx session 1  5522-7609 Tx session 2  [id number]   Total Timed Code Min 30 30   Total Treatment Minutes 30 30   Individual Treatment Minutes 30 30   Group Treatment Minutes 0 0   Co-Treat Minutes 0 0   Variance/Reason:  N/A N/A   Pain Denies  N/A  motivation, and Limited insight into deficits  Discharge recommendations:  [] Home independently  [] Home with assistance [x]  24 hour supervision  [] ECF [x] Other: see PT/OT recs  Continued Tx Upon Discharge: ? [x] Yes [] No [] TBD based on progress while on ARU [] Vital Stim indicated [] Other:   Estimated discharge date: 11/09/24    Interventions used this date:  [] Speech/Language Treatment  [] Instruction in HEP [] Group [] Dysphagia Treatment [x] Cognitive Treatment   [] Other:      Total Time Breakdown / Charges    Time in Time out Total Time / units   Cognitive Tx 1100  1230 1130  1300 30 min / 2 units   30 mins / 2 units   Speech Tx      Dysphagia Tx          Electronically Signed by     Keesha Baxter MA CF-SLP   Speech Language Pathologist

## 2024-11-06 NOTE — PLAN OF CARE
Problem: Safety - Adult  Goal: Free from fall injury  Outcome: Progressing     Problem: Discharge Planning  Goal: Discharge to home or other facility with appropriate resources  Outcome: Progressing     Problem: Skin/Tissue Integrity - Adult  Goal: Skin integrity remains intact  Outcome: Progressing

## 2024-11-06 NOTE — PROGRESS NOTES
Physical Therapy  Facility/Department: [facility]  Rehabilitation Physical Therapy Treatment Note    NAME: Akbar Arias  : 1935 (88 y.o.)  MRN: [id number]  CODE STATUS: Full Code    Date of Service: 24       Restrictions:  Restrictions/Precautions: Fall Risk, General Precautions  Position Activity Restriction  Other position/activity restrictions: up with assistance, poor L/R distinction, impulsive     SUBJECTIVE  Subjective  Subjective: Pt sitting up in chair upon arrival, wife at bedside. Agreeable to PT tx  Pain: none reported throughout session    OBJECTIVE  Cognition  Overall Cognitive Status: Exceptions  Arousal/Alertness: Appears intact  Following Commands: Follows one step commands with repetition;Follows one step commands with increased time;Inconsistently follows commands  Attention Span: Attends with cues to redirect;Difficulty dividing attention;Difficulty attending to directions  Memory: Decreased recall of recent events;Decreased short term memory;Decreased recall of biographical Information  Safety Judgement: Decreased awareness of need for assistance;Decreased awareness of need for safety  Problem Solving: Assistance required to correct errors made;Assistance required to identify errors made;Assistance required to implement solutions;Assistance required to generate solutions;Decreased awareness of errors  Insights: Decreased awareness of deficits  Initiation: Requires cues for all  Sequencing: Requires cues for some  Cognition Comment: impulsive during ADL session and needing cues for safety  Orientation  Overall Orientation Status: Impaired  Orientation Level: Oriented to place;Disoriented to time;Disoriented to situation;Oriented to person (Pt unable to state birth date this session and current year)    Functional Mobility  Transfers  Additional Factors: Verbal cues;Increased time to complete;Hand placement cues  Device: Walker  Sit to Stand  Assistance Level: Stand by  performed standing on foam surface and RUE support on RW while using LUE to select numbers. Rotator changing directions between CW/CCW randomly    ASSESSMENT/PROGRESS TOWARDS GOALS  Vitals:    11/06/24 0957   BP: (!) 147/63   Pulse: 56   Resp: 16   Temp: 97.5 °F (36.4 °C)   SpO2: 93%     Assessment  Assessment: Pt continues to progress well towards PT goals. Limited by impulsiveness and poor balance reactions. Session focused on standing balance on uneven surfaces, reaching outside ADIN and endurance training. Pt will benefit from continued Skilled PT to address functional mobility deficits, maximize independence and safety  Activity Tolerance: Patient tolerated treatment well  Discharge Recommendations: 24 hour supervision or assist;Outpatient PT  PT Equipment Recommendations  Equipment Needed: No  Other: No DME needs anticipated    Goals  Patient Goals   Patient Goals : \"Get out of here and be able to use my R hand\"  Short Term Goals  Time Frame for Short Term Goals: 1 week 11/02/2024  Short Term Goal 1: Pt will complete bed mobility with IND--11/1 MET  Short Term Goal 2: Pt will complete functional t/f with LRAD with SBA--11/1 MET  Short Term Goal 3: Pt will ambulate >150' with LRAD with SBA--11/6 MET  Short Term Goal 4: Pt will ascend/descend 12 steps with HR with SBA  Long Term Goals  Time Frame for Long Term Goals : 2 weeks 11/09/2024  Long Term Goal 1: Pt will demonstrate functional t/f with Ciro  Long Term Goal 2: Pt will ambulate >150' with LRAD with Ciro  Long Term Goal 3: Pt will ascend 12 setps with 1-2 HR with Ciro  Long Term Goal 4: Pt will demonstrate improved score on Pena to >44 to demonstrate decreased risk of falls.    PLAN OF CARE/SAFETY  Physical Therapy Plan  General Plan: 5-7 times per week  Therapy Duration: 2 Weeks  Specific Instructions for Next Treatment: Progress mobility as tolerated  Current Treatment Recommendations: Strengthening;Balance training;ROM;Functional mobility

## 2024-11-06 NOTE — PROGRESS NOTES
Occupational Therapy  Facility/Department: [facility]  Rehabilitation Occupational Therapy Daily Treatment Note    Date: 24  Patient Name: Akbar Arias       Room: 0152/0152-01  MRN: [id number]  Account: [id number]   : 1935  (88 y.o.) Gender: male              Pt was bathed during OT session this date. Pt was Showered with soap/water with Supervision.        Past Medical History:  has a past medical history of Hyperlipidemia and Hypertension.  Past Surgical History:   has a past surgical history that includes back surgery (); eye surgery (Right, 2013); and eye surgery (Left, 13).    Restrictions  Restrictions/Precautions: Fall Risk, General Precautions  Other position/activity restrictions: up with assistance, poor L/R distinction, impulsive    Subjective  Subjective: Pt in bed, pleasant, no pain, agreeable to OT session, ADL requested daily per spouse, /63, HR 56, O2 sats 93% on RA.  Restrictions/Precautions: Fall Risk;General Precautions             Objective     Cognition  Overall Cognitive Status: Exceptions  Arousal/Alertness: Appears intact  Following Commands: Follows one step commands with repetition;Follows one step commands with increased time;Inconsistently follows commands  Attention Span: Attends with cues to redirect;Difficulty dividing attention;Difficulty attending to directions  Memory: Decreased recall of recent events;Decreased short term memory;Decreased recall of biographical Information  Safety Judgement: Decreased awareness of need for assistance;Decreased awareness of need for safety  Problem Solving: Assistance required to correct errors made;Assistance required to identify errors made;Assistance required to implement solutions;Assistance required to generate solutions;Decreased awareness of errors  Insights: Decreased awareness of deficits  Initiation: Requires cues for all  Sequencing: Requires cues for some  Cognition Comment: impulsive during ADL  bed raised;With handrails  Supine to Sit  Assistance Level: Modified independent  Scooting  Assistance Level: Modified independent  Skilled Clinical Factors: to EOB  Transfers  Surface: From bed;To chair with arms;From chair with arms;Standard toilet;From chair without arms;To chair without arms  Additional Factors: Set-up;With handrails  Sit to Stand  Assistance Level: Supervision  Stand to Sit  Assistance Level: Stand by assist     Neuromuscular Education  Neuromuscular education: Yes  NDT Treatment: Upper extremity  Functional Movement Patterns: Pt stood at table to engage in bimanual coordination task. He used LUE to grasp yellow, red, and green clips from L side of table to transfer to RUE to grasp/release onto pole on R side of table. Pt demronnie's significant difficulty with transferring clips from LUE to RUE at midline and required max verbal cues for sequencing/completing task. He required physical assistance to orient clips in R hand to be able to release onto pole. He needed verbal cues for >75% of task, but demo'd improvement with bimanual coordination/ability to transfer clips ~[facility] through task. Poor-fair FMC with R hand when grasping/pinching all clips. Pt verbalized frustration with activity and was educated on the importance of completing task during session to iprove ability to use RUE.     Assessment  Assessment  Assessment: Pt agreeable to OT session. Pt ania's improved use of RUE during ADL tasks and progressed to SPV for UB ADLs, LB ADLs, and grooming tasks in stance at sink. He performs all funcitonal transfers with SPV-SBA and required CGA for mobility to/from bathroom and to/from gym. Pt completed a bimanual coordination exercise with fair tolerance; he requried max verbal cues for sequencing during task. Initially, pt demo'd poor bimanual coordination, but improved to fair during task after repetition. He expressed frustration with current deficits and was educated on importance of

## 2024-11-07 ENCOUNTER — ANCILLARY PROCEDURE (OUTPATIENT)
Dept: CARDIOLOGY CLINIC | Age: 89
End: 2024-11-07

## 2024-11-07 ENCOUNTER — TELEPHONE (OUTPATIENT)
Dept: CARDIAC CATH/INVASIVE PROCEDURES | Age: 89
End: 2024-11-07

## 2024-11-07 DIAGNOSIS — R00.1 BRADYCARDIA: ICD-10-CM

## 2024-11-07 LAB
GLUCOSE BLD-MCNC: 102 MG/DL (ref 70–99)
GLUCOSE BLD-MCNC: 114 MG/DL (ref 70–99)
GLUCOSE BLD-MCNC: 147 MG/DL (ref 70–99)
GLUCOSE BLD-MCNC: 99 MG/DL (ref 70–99)
PERFORMED ON: ABNORMAL
PERFORMED ON: NORMAL

## 2024-11-07 PROCEDURE — 97130 THER IVNTJ EA ADDL 15 MIN: CPT

## 2024-11-07 PROCEDURE — 97112 NEUROMUSCULAR REEDUCATION: CPT

## 2024-11-07 PROCEDURE — 97110 THERAPEUTIC EXERCISES: CPT

## 2024-11-07 PROCEDURE — 97530 THERAPEUTIC ACTIVITIES: CPT

## 2024-11-07 PROCEDURE — 97129 THER IVNTJ 1ST 15 MIN: CPT

## 2024-11-07 PROCEDURE — 6360000002 HC RX W HCPCS: Performed by: STUDENT IN AN ORGANIZED HEALTH CARE EDUCATION/TRAINING PROGRAM

## 2024-11-07 PROCEDURE — 1280000000 HC REHAB R&B

## 2024-11-07 PROCEDURE — 97535 SELF CARE MNGMENT TRAINING: CPT

## 2024-11-07 PROCEDURE — 97116 GAIT TRAINING THERAPY: CPT

## 2024-11-07 PROCEDURE — 6370000000 HC RX 637 (ALT 250 FOR IP): Performed by: STUDENT IN AN ORGANIZED HEALTH CARE EDUCATION/TRAINING PROGRAM

## 2024-11-07 RX ADMIN — HEPARIN SODIUM 5000 UNITS: 5000 INJECTION INTRAVENOUS; SUBCUTANEOUS at 15:24

## 2024-11-07 RX ADMIN — POLYETHYLENE GLYCOL 3350 17 G: 17 POWDER, FOR SOLUTION ORAL at 09:46

## 2024-11-07 RX ADMIN — HYDRALAZINE HYDROCHLORIDE 25 MG: 25 TABLET ORAL at 05:58

## 2024-11-07 RX ADMIN — HYDRALAZINE HYDROCHLORIDE 25 MG: 25 TABLET ORAL at 21:05

## 2024-11-07 RX ADMIN — HEPARIN SODIUM 5000 UNITS: 5000 INJECTION INTRAVENOUS; SUBCUTANEOUS at 05:58

## 2024-11-07 RX ADMIN — SENNOSIDES AND DOCUSATE SODIUM 1 TABLET: 50; 8.6 TABLET ORAL at 21:04

## 2024-11-07 RX ADMIN — LACTULOSE 20 G: 20 SOLUTION ORAL at 09:46

## 2024-11-07 RX ADMIN — VALSARTAN 80 MG: 80 TABLET, FILM COATED ORAL at 21:04

## 2024-11-07 RX ADMIN — ASPIRIN 325 MG: 325 TABLET, COATED ORAL at 09:46

## 2024-11-07 RX ADMIN — AMLODIPINE BESYLATE 10 MG: 5 TABLET ORAL at 09:47

## 2024-11-07 RX ADMIN — ATORVASTATIN CALCIUM 40 MG: 40 TABLET, FILM COATED ORAL at 21:04

## 2024-11-07 RX ADMIN — SENNOSIDES AND DOCUSATE SODIUM 1 TABLET: 50; 8.6 TABLET ORAL at 09:47

## 2024-11-07 RX ADMIN — LACTULOSE 20 G: 20 SOLUTION ORAL at 21:04

## 2024-11-07 RX ADMIN — VALSARTAN 80 MG: 80 TABLET, FILM COATED ORAL at 09:47

## 2024-11-07 RX ADMIN — METFORMIN HYDROCHLORIDE 500 MG: 500 TABLET ORAL at 09:47

## 2024-11-07 RX ADMIN — LACTULOSE 20 G: 20 SOLUTION ORAL at 15:24

## 2024-11-07 RX ADMIN — METFORMIN HYDROCHLORIDE 500 MG: 500 TABLET ORAL at 17:01

## 2024-11-07 RX ADMIN — HEPARIN SODIUM 5000 UNITS: 5000 INJECTION INTRAVENOUS; SUBCUTANEOUS at 21:04

## 2024-11-07 RX ADMIN — CLOPIDOGREL BISULFATE 75 MG: 75 TABLET ORAL at 09:47

## 2024-11-07 ASSESSMENT — PAIN SCALES - GENERAL
PAINLEVEL_OUTOF10: 0
PAINLEVEL_OUTOF10: 0

## 2024-11-07 NOTE — PROGRESS NOTES
[facility]: ACUTE REHAB UNIT  SPEECH-LANGUAGE PATHOLOGY      [x] Daily  [] Weekly Care Conference Note  [] Discharge    Patient:Akbar Arias      :1935  MRN:[id number]  Rehab Dx/Hx: Acute CVA (cerebrovascular accident) (HCC) [I63.9]  Bradycardia [R00.1]    Precautions: falls  Home situation: lives with wife. Indep with med management. Wife manages finances.  ST Dx: [] Aphasia  [] Dysarthria  [] Apraxia   [] Oropharyngeal dysphagia [x] Cognitive Impairment  [] Other:   Date of Admit: 10/27/2024  Room #: 0152/0152-01    Current functional status (updated daily):         Pt being seen for : [] Speech/Language Treatment  [] Dysphagia Treatment [x] Cognitive Treatment  [] Other:  Communication: []WFL  [] Aphasia  [] Dysarthria  [] Apraxia  [] Pragmatic Impairment [] Non-verbal  [x] Hearing Loss  [x] Other: min expressive / receptive language deficits   Cognition: [] WFL  [] Mild  [] Moderate  [x] Severe [] Unable to Assess  [] Other:  Memory: [] WFL  [] Mild  [] Moderate  [x] Severe [] Unable to Assess  [] Other:  Behavior: [x] Alert  [x] Cooperative  []  Pleasant  [] Confused  [] Agitated  [] Uncooperative  [] Distractible [] Motivated  [x] Self-Limiting [] Anxious  [] Other:  Endurance:  [x] Adequate for participation in SLP sessions  [] Reduced overall  [] Lethargic  [] Other:  Safety: [] No concerns at this time  [x] Reduced insight into deficits  [x]  Reduced safety awareness [] Not following call light procedures  [] Unable to Assess  [] Other:    Current Diet Order:ADULT ORAL NUTRITION SUPPLEMENT; Breakfast; Diabetic Oral Supplement  ADULT DIET; Regular; 4 carb choices (60 gm/meal)  Swallowing Precautions: general aspiration precautions         Date: 2024      Tx session 1  1230 - 1330 Tx session 2  All tx needs met in session 1   Total Timed Code Min 60 0   Total Treatment Minutes 60 0   Individual Treatment Minutes 60 0   Group Treatment Minutes 0 0   Co-Treat Minutes 0 0   Variance/Reason:  N/A N/A

## 2024-11-07 NOTE — TELEPHONE ENCOUNTER
Monitor company [employer]  Length of monitor 30 days  Monitor ordered by  Desiree Mai MD   Patch ID [id number]  Device number [id number]  Monitor given to Carroll Chen RN   Nurse to apply at the time of discharge.

## 2024-11-07 NOTE — PROGRESS NOTES
Akbar Arias  11/7/2024  [id number]    Chief Complaint: Acute CVA (cerebrovascular accident) (HCC)    Subjective:   No acute events overnight. Today Ed is seen with family and nursing present. He reports feeling well. He denies any chest pain or shortness of breath. He reports that therapy is going well.     Personally reviewed labs.     ROS: denies f/c, n/v, cp, sob    Objective:  Patient Vitals for the past 24 hrs:   BP Temp Temp src Pulse Resp SpO2 Height   11/07/24 0957 -- -- -- -- -- -- 1.829 m (6')   11/07/24 0943 (!) 146/67 97.8 °F (36.6 °C) Oral 55 16 97 % --   11/07/24 0558 129/69 -- -- -- -- -- --   11/06/24 1959 (!) 140/76 98 °F (36.7 °C) Oral 55 16 98 % --   11/06/24 1446 (!) 107/52 -- -- 70 -- -- --     Gen: No distress, very pleasant.   HEENT: Normocephalic, atraumatic.   CV: extremities well perfused  Resp: No respiratory distress. On room air  Abd: Soft, nondistended  Ext: No edema.   Neuro: Alert, oriented, appropriately interactive. Speech fluent  Psych: appropriate mood and affect    Wt Readings from Last 3 Encounters:   11/03/24 86.3 kg (190 lb 3.2 oz)   06/04/18 108.4 kg (239 lb)   01/22/18 111.6 kg (246 lb)       Laboratory data:   Lab Results   Component Value Date    WBC 4.7 11/06/2024    HGB 11.2 (L) 11/06/2024    HCT 34.2 (L) 11/06/2024    MCV 95.8 11/06/2024     11/06/2024       Lab Results   Component Value Date/Time     11/06/2024 06:30 AM    K 4.2 11/06/2024 06:30 AM     11/06/2024 06:30 AM    CO2 21 11/06/2024 06:30 AM    BUN 31 11/06/2024 06:30 AM    CREATININE 1.6 11/06/2024 06:30 AM    GLUCOSE 108 11/06/2024 06:30 AM    CALCIUM 9.4 11/06/2024 06:30 AM        Therapy progress:  Physical therapy:  Bed Mobility:     Sit>supine:  Assistance Level: Modified independent  Skilled Clinical Factors: HOB elevated  Supine>sit:  Assistance Level: Modified independent  Skilled Clinical Factors: HOB elevated  Transfers:  Surface: From chair with arms, To chair with arms, To  chair without arms, From chair without arms  Additional Factors: Verbal cues, Increased time to complete, Hand placement cues  Device: Walker  Sit>stand:  Assistance Level: Stand by assist, Supervision  Skilled Clinical Factors: intermittent cues for safe hand placement, progressing to supervision  Stand>sit:  Assistance Level: Stand by assist, Supervision  Bed<>chair  Technique: Stand step  Assistance Level: Stand by assist  Skilled Clinical Factors: RW via ambulation  Stand Pivot:     Lateral transfer:     Car transfer:     Ambulation:  Surface: Level surface  Device: Rolling walker  Distance: 200 ft  Activity: Within Unit  Activity Comments: visual scanning for cones R and L and placing them back around hallway, CGA  Additional Factors: Verbal cues, Hand placement cues, Increased time to complete  Assistance Level: Stand by assist  Gait Deviations: Wide base of support, Path deviations  Skilled Clinical Factors: Pt requires max cues for pathfinding and unable to follow directions to turn Left and Right, cues for pacing/safety with speed. no overt LOB this date  Stairs:  Stair Height: 6''  Device: Bilateral handrails  Number of Stairs: 12  Additional Factors: Non-reciprocal going down, Non-reciprocal going up  Assistance Level: Stand by assist  Skilled Clinical Factors: non-reciprocal pattern, pt demo good awareness of RUE management rail  Curb:     Wheelchair:       Occupational therapy:   Feeding     Grooming/Oral Hygiene  Assistance Level: Supervision  Skilled Clinical Factors: standing at sink to brush teeth and comb hair, min VCs for sequencing and assist to place toothpaste on toothbrush  UE Bathing  Assistance Level: Supervision  Skilled Clinical Factors: min VCs for sequencing and thoroughness  LE Bathing  Assistance Level: Supervision  Skilled Clinical Factors: min VCs for sequencing, SPV in stance to bathe buttocks,  UE Dressing  Assistance Level: Minimal assistance  Skilled Clinical Factors: assist to

## 2024-11-07 NOTE — CARE COORDINATION
CM update: Received information that the [facility] OP therapy location does not have speech therapy. Spoke with wife Marielos over the phone and she states she will choose the [facility] location for her - Dr. Fraire notified of location change.    Sara Quach RN

## 2024-11-07 NOTE — PROGRESS NOTES
Comprehensive Nutrition Assessment    Type and Reason for Visit:  Reassess    Nutrition Recommendations/Plan:   Continue 60g/meal CHO diet.  Continue Glucerna shake once/day.   Encourage PO intakes.   Monitor nutrition adequacy, pertinent labs, bowel habits, wt changes, and clinical progress.      Malnutrition Assessment:  Malnutrition Status:  At risk for malnutrition (10/31/24 1212)    Context:  Acute Illness     Findings of the 6 clinical characteristics of malnutrition:  Energy Intake:  Mild decrease in energy intake  Weight Loss:  Unable to assess     Body Fat Loss:  No body fat loss Triceps   Muscle Mass Loss:  No muscle mass loss Temples (temporalis)  Fluid Accumulation:  No fluid accumulation     Strength:  Not Performed    Nutrition Assessment:    Follow up: Pt remains nutritionally at risk AEB fair appetite. Pt consuming % of meals. Reports appetite is \"fine.\" Family bringing in meals/snacks as needed. Drinking sips of Glucerna shakes. Declined any nutrition questions or concerns at this time. Will conitnue to monitor.    Nutrition Related Findings:    BUN 31, creatinine 1.6, GFR 41, glucose 108-122. Trace edema RUE. LBM 11/4. Wound Type: None       Current Nutrition Intake & Therapies:    Average Meal Intake: 51-75%, %  Average Supplements Intake: Unable to assess  ADULT ORAL NUTRITION SUPPLEMENT; Breakfast; Diabetic Oral Supplement  ADULT DIET; Regular; 4 carb choices (60 gm/meal)    Anthropometric Measures:  Height: 182.9 cm (6')  Ideal Body Weight (IBW): 178 lbs (81 kg)    Admission Body Weight: 87.2 kg (192 lb 3.9 oz)  Current Body Weight: 86.3 kg (190 lb 4.1 oz) (11/3/24), 106.9 % IBW. Weight Source: Standing scale  Current BMI (kg/m2): 25.8  Usual Body Weight: 92.1 kg (203 lb)     % Weight Change (Calculated): -5.3  Weight Adjustment For: No Adjustment       BMI Categories: Overweight (BMI 25.0-29.9)    Estimated Daily Nutrient Needs:  Energy Requirements Based On: Kcal/kg  (25-30)  Weight Used for Energy Requirements: Ideal  Energy (kcal/day): 2025 - 2430kcals/day  Weight Used for Protein Requirements: Ideal (1.2-1.4)  Protein (g/day): 97 - 113g/day  Method Used for Fluid Requirements: 1 ml/kcal  Fluid (ml/day): 2025 - 2430ml/day    Nutrition Diagnosis:   Increased nutrient needs related to inadequate protein-energy intake as evidenced by intake 51-75%, rehab for strength and conditioning    Nutrition Interventions:   Food and/or Nutrient Delivery: Continue Current Diet, Continue Oral Nutrition Supplement  Nutrition Education/Counseling: No recommendation at this time, Education/Counseling declined  Coordination of Nutrition Care: Continue to monitor while inpatient, Interdisciplinary Rounds       Goals:  Goals: Meet at least 75% of estimated needs, by next RD assessment  Type of Goal: New goal  Previous Goal Met: New Goal    Nutrition Monitoring and Evaluation:   Behavioral-Environmental Outcomes: None Identified  Food/Nutrient Intake Outcomes: Food and Nutrient Intake, Supplement Intake  Physical Signs/Symptoms Outcomes: Biochemical Data, Nutrition Focused Physical Findings, Weight    Discharge Planning:    Continue current diet     Veronika Mandel RD  Contact: [phone]

## 2024-11-07 NOTE — PROGRESS NOTES
Physical Therapy  Facility/Department: [facility]  Rehabilitation Physical Therapy Treatment Note    NAME: Akbar Arias  : 1935 (88 y.o.)  MRN: [id number]  CODE STATUS: Full Code    Date of Service: 24       Restrictions:  Restrictions/Precautions: Fall Risk, General Precautions  Position Activity Restriction  Other position/activity restrictions: up with assistance, poor L/R distinction, impulsive     SUBJECTIVE  Subjective  Subjective: Pt sitting up in chair upon arrival, ageeable to PT tx  Pain: none reported throughout session    OBJECTIVE  Cognition  Overall Cognitive Status: Exceptions  Arousal/Alertness: Appears intact  Following Commands: Follows one step commands with repetition;Follows one step commands with increased time;Inconsistently follows commands  Attention Span: Attends with cues to redirect;Difficulty dividing attention;Difficulty attending to directions  Memory: Decreased recall of recent events;Decreased short term memory;Decreased recall of biographical Information  Safety Judgement: Decreased awareness of need for assistance;Decreased awareness of need for safety  Problem Solving: Assistance required to correct errors made;Assistance required to identify errors made;Assistance required to implement solutions;Assistance required to generate solutions;Decreased awareness of errors  Insights: Decreased awareness of deficits  Initiation: Requires cues for all  Sequencing: Requires cues for some  Orientation  Overall Orientation Status: Impaired  Orientation Level: Oriented to place;Disoriented to time;Oriented to person;Oriented to situation    Functional Mobility  Balance  Standing Balance: Stand by assistance  Transfers  Additional Factors: Verbal cues;Increased time to complete;Hand placement cues  Device: Walker  Sit to Stand  Assistance Level: Supervision  Skilled Clinical Factors: intermittent cues for safe hand placement  Stand to Sit  Assistance Level: Supervision  Skilled  Clinical Factors: VC for hand placement    Environmental Mobility  Ambulation  Surface: Level surface  Device: Rolling walker  Activity Comments: Pt ambulated 5 minutes within hospital, VC for L and R, directions and pathfinding  Additional Factors: Verbal cues;Hand placement cues;Increased time to complete  Assistance Level: Stand by assist  Gait Deviations: Wide base of support;Path deviations    PT Exercises  Dynamic Standing Balance Exercises: Pt performed x15 reps step ups onto balance board with BUE support on RA and min(A) progressing to CGA; Pt then performed standing on level surface tossing bean bag at board x10, then standing on foam surface x10. Pt retrieved all 20 bean bags from floor with CGA. Pt requires intermittent cues on attention to task  Disease-specific Exercises: Pt performed repeated STS with 6# medicine ball from elevated therapy mat x10, + x10 with chest press, + x10 with overhead press, + x10 lateral step + bicep curl, + x10 squat to 6\" step      ASSESSMENT/PROGRESS TOWARDS GOALS  Vitals:    11/07/24 0943   BP: (!) 146/67   Pulse: 55   Resp: 16   Temp: 97.8 °F (36.6 °C)   SpO2: 97%            Goals  Patient Goals   Patient Goals : \"Get out of here and be able to use my R hand\"  Short Term Goals  Time Frame for Short Term Goals: 1 week 11/02/2024  Short Term Goal 1: Pt will complete bed mobility with IND--11/1 MET  Short Term Goal 2: Pt will complete functional t/f with LRAD with SBA--11/1 MET  Short Term Goal 3: Pt will ambulate >150' with LRAD with SBA--11/6 MET  Short Term Goal 4: Pt will ascend/descend 12 steps with HR with SBA  Long Term Goals  Time Frame for Long Term Goals : 2 weeks 11/09/2024  Long Term Goal 1: Pt will demonstrate functional t/f with Ciro  Long Term Goal 2: Pt will ambulate >150' with LRAD with Ciro  Long Term Goal 3: Pt will ascend 12 setps with 1-2 HR with Ciro  Long Term Goal 4: Pt will demonstrate improved score on Pena to >44 to demonstrate decreased risk of

## 2024-11-07 NOTE — CARE COORDINATION
CM update: Spoke with wife Marielos in room to discuss discharge planning. Explained to wife that she will need to choose a different OP therapy location. [facility] [address] [facility] can no longer provide OP therapy services. Wife reports the [facility] location on [address] would be next best preferred option - Dr. Fraire made aware. Wife confirms that she is still planning to attend scheduled Family Training on 11/8 from 1:30-2:30pm. Wife understands that a RW will be provided at discharge. No further questions/concerns. Will continue to follow.    Sara Quach RN

## 2024-11-07 NOTE — TELEPHONE ENCOUNTER
Patient is currently wearing a [employer] brand event monitor which was placed at .  [employer] monitor was not connected and order will be cancelled.  Thanks

## 2024-11-07 NOTE — PROGRESS NOTES
correct 1 LOB when looking up when searching for birds in hallway, CGA for majority of mobility, SBA in bathroom, standing for 3-4 minutes for grooming, 6 minutes for IADL, 8 minutes for searching for birds in hallway  Bed Mobility  Overall Assistance Level: Modified Independent  Additional Factors: Head of bed raised;With handrails  Supine to Sit  Assistance Level: Modified independent  Scooting  Assistance Level: Modified independent  Skilled Clinical Factors: to EOB  Transfers  Surface: From bed;To chair with arms;From chair with arms;Standard toilet;From chair without arms;To chair without arms  Additional Factors: Set-up;With handrails  Sit to Stand  Assistance Level: Supervision  Stand to Sit  Assistance Level: Supervision  Bed To/From Chair  Technique: Stand step  Assistance Level: Contact guard assist   Neuromuscular Education  Neuromuscular education: Yes  Functional Movement Patterns: Pt demonstrated fair coordination to grasp coins from stereognosis bin with RUE, but only able to locate 5/16 coins and assist to locate remainder of coins when becoming frustrated. Pt able to remove all coins with no dropping of items with RUE. Pt demonstrated fair incorporation of RUE into folding clothes initially but decreased use as task progressed. Pt able to hold paper in R hand and marker in L hand to check off birds as locating them in hallway. Max VCs for visual scanning to locate birds, missing all birds on R side and 75% on L side. Pt unable to match birds for 90% of birds.  Response to Techniques: Pt demonstrated fair ability to pour ice from cup to cup with BUEs and major spilling on first rep, but able to count 1-26 and A-Z while pouring with good rhythm and coordination.     Assessment  Assessment  Assessment: Pt agreeable to OT session. Pt demonstrated improved balance for mobility in bathroom with SBA and min VCs for sequencing through bathing and dressing. Pt still requires cues for progression through  bathing and dressing, including min A to thread RUE. Pt completed FMC tasks with fair use of RUE and improved incorporation with repetition of pouring ice from cups but poor ability to use RUE with folding laundry. Pt demonstrated poor visual scanning to locate birds in hallway with assist to locate 10/12 birds. Continue POC.  Activity Tolerance: Patient tolerated treatment well  Discharge Recommendations: 24 hour supervision or assist;Outpatient OT  OT Equipment Recommendations  Equipment Needed: No  Safety Devices  Safety Devices in place: Yes  Type of devices: Gait belt;Call light within reach;Chair alarm in place;Left in chair;Nurse notified    Patient Education  Education  Education Given To: Patient  Education Provided: Role of Therapy;ADL Function;Precautions;Safety;Transfer Training;Equipment;Mobility Training;Cognition;Plan of Care;IADL Function  Education Provided Comments: forced use of RUE, safety with transfers, visual scanning, inattention with visual scanning (to R side in particular), ADL sequencing, obstacle avoidance and balance, FMC, GMC  Education Method: Demonstration;Verbal  Barriers to Learning: Cognition  Education Outcome: Verbalized understanding;Continued education needed    Plan  Occupational Therapy Plan  Times Per Week: 5-7x  Current Treatment Recommendations: Balance training;Functional mobility training;Endurance training;Strengthening;Self-Care / ADL;Safety education & training;Equipment evaluation, education, & procurement;Patient/Caregiver education & training;ROM;Neuromuscular re-education;Coordination training;Cognitive/Perceptual training    Goals  Short Term Goals  Time Frame for Short Term Goals: 6 days- 11/1/24  Short Term Goal 1: Pt will complete toileting with SPV. GOAL MET 11/5/24 Pt completed toileting with SPV.  Short Term Goal 2: Pt will perform full body dressing with SPV. GOAL MET 11/4/24 Pt performed full body dressing with SPV.  Short Term Goal 3: Pt will complete

## 2024-11-08 LAB
GLUCOSE BLD-MCNC: 111 MG/DL (ref 70–99)
GLUCOSE BLD-MCNC: 135 MG/DL (ref 70–99)
GLUCOSE BLD-MCNC: 86 MG/DL (ref 70–99)
GLUCOSE BLD-MCNC: 93 MG/DL (ref 70–99)
PERFORMED ON: ABNORMAL
PERFORMED ON: ABNORMAL
PERFORMED ON: NORMAL
PERFORMED ON: NORMAL

## 2024-11-08 PROCEDURE — 6370000000 HC RX 637 (ALT 250 FOR IP): Performed by: STUDENT IN AN ORGANIZED HEALTH CARE EDUCATION/TRAINING PROGRAM

## 2024-11-08 PROCEDURE — 97112 NEUROMUSCULAR REEDUCATION: CPT

## 2024-11-08 PROCEDURE — 6360000002 HC RX W HCPCS: Performed by: STUDENT IN AN ORGANIZED HEALTH CARE EDUCATION/TRAINING PROGRAM

## 2024-11-08 PROCEDURE — 97530 THERAPEUTIC ACTIVITIES: CPT

## 2024-11-08 PROCEDURE — 97130 THER IVNTJ EA ADDL 15 MIN: CPT

## 2024-11-08 PROCEDURE — 97129 THER IVNTJ 1ST 15 MIN: CPT

## 2024-11-08 PROCEDURE — 97116 GAIT TRAINING THERAPY: CPT

## 2024-11-08 PROCEDURE — 1280000000 HC REHAB R&B

## 2024-11-08 PROCEDURE — 97535 SELF CARE MNGMENT TRAINING: CPT

## 2024-11-08 PROCEDURE — 97110 THERAPEUTIC EXERCISES: CPT

## 2024-11-08 RX ORDER — VALSARTAN 80 MG/1
80 TABLET ORAL 2 TIMES DAILY
Qty: 60 TABLET | Refills: 1 | Status: SHIPPED | OUTPATIENT
Start: 2024-11-08

## 2024-11-08 RX ORDER — AMLODIPINE BESYLATE 10 MG/1
10 TABLET ORAL DAILY
Qty: 30 TABLET | Refills: 1 | Status: SHIPPED | OUTPATIENT
Start: 2024-11-08

## 2024-11-08 RX ORDER — HYDRALAZINE HYDROCHLORIDE 25 MG/1
25 TABLET, FILM COATED ORAL EVERY 8 HOURS SCHEDULED
Qty: 90 TABLET | Refills: 1 | Status: SHIPPED | OUTPATIENT
Start: 2024-11-08

## 2024-11-08 RX ORDER — CLOPIDOGREL BISULFATE 75 MG/1
75 TABLET ORAL DAILY
Qty: 30 TABLET | Refills: 1 | Status: SHIPPED | OUTPATIENT
Start: 2024-11-08

## 2024-11-08 RX ORDER — ASPIRIN 325 MG
325 TABLET, DELAYED RELEASE (ENTERIC COATED) ORAL DAILY
Qty: 30 TABLET | Refills: 2 | Status: SHIPPED | OUTPATIENT
Start: 2024-11-09

## 2024-11-08 RX ORDER — ATORVASTATIN CALCIUM 40 MG/1
40 TABLET, FILM COATED ORAL NIGHTLY
Qty: 30 TABLET | Refills: 2 | Status: SHIPPED | OUTPATIENT
Start: 2024-11-08

## 2024-11-08 RX ORDER — SENNA AND DOCUSATE SODIUM 50; 8.6 MG/1; MG/1
1 TABLET, FILM COATED ORAL 2 TIMES DAILY
Qty: 60 TABLET | Refills: 1 | Status: SHIPPED | OUTPATIENT
Start: 2024-11-08

## 2024-11-08 RX ORDER — METFORMIN HYDROCHLORIDE 500 MG/1
500 TABLET, EXTENDED RELEASE ORAL 2 TIMES DAILY
Qty: 60 TABLET | Refills: 1 | Status: SHIPPED | OUTPATIENT
Start: 2024-11-08

## 2024-11-08 RX ADMIN — HYDRALAZINE HYDROCHLORIDE 25 MG: 25 TABLET ORAL at 05:52

## 2024-11-08 RX ADMIN — METFORMIN HYDROCHLORIDE 500 MG: 500 TABLET ORAL at 10:31

## 2024-11-08 RX ADMIN — METFORMIN HYDROCHLORIDE 500 MG: 500 TABLET ORAL at 17:34

## 2024-11-08 RX ADMIN — AMLODIPINE BESYLATE 10 MG: 5 TABLET ORAL at 10:21

## 2024-11-08 RX ADMIN — HEPARIN SODIUM 5000 UNITS: 5000 INJECTION INTRAVENOUS; SUBCUTANEOUS at 20:23

## 2024-11-08 RX ADMIN — SENNOSIDES AND DOCUSATE SODIUM 1 TABLET: 50; 8.6 TABLET ORAL at 10:21

## 2024-11-08 RX ADMIN — SENNOSIDES AND DOCUSATE SODIUM 1 TABLET: 50; 8.6 TABLET ORAL at 20:21

## 2024-11-08 RX ADMIN — HEPARIN SODIUM 5000 UNITS: 5000 INJECTION INTRAVENOUS; SUBCUTANEOUS at 05:52

## 2024-11-08 RX ADMIN — ASPIRIN 325 MG: 325 TABLET, COATED ORAL at 10:22

## 2024-11-08 RX ADMIN — HEPARIN SODIUM 5000 UNITS: 5000 INJECTION INTRAVENOUS; SUBCUTANEOUS at 14:40

## 2024-11-08 RX ADMIN — VALSARTAN 80 MG: 80 TABLET, FILM COATED ORAL at 20:21

## 2024-11-08 RX ADMIN — CLOPIDOGREL BISULFATE 75 MG: 75 TABLET ORAL at 10:21

## 2024-11-08 RX ADMIN — VALSARTAN 80 MG: 80 TABLET, FILM COATED ORAL at 10:22

## 2024-11-08 RX ADMIN — HYDRALAZINE HYDROCHLORIDE 25 MG: 25 TABLET ORAL at 20:21

## 2024-11-08 RX ADMIN — ATORVASTATIN CALCIUM 40 MG: 40 TABLET, FILM COATED ORAL at 20:21

## 2024-11-08 ASSESSMENT — PAIN SCALES - GENERAL: PAINLEVEL_OUTOF10: 0

## 2024-11-08 NOTE — DISCHARGE INSTR - COC
Continuity of Care Form    Patient Name: Akbar Arias   :  1935  MRN:  [id number]    Admit date:  10/27/2024  Discharge date:  2024    Code Status Order: Full Code   Advance Directives:   Advance Care Flowsheet Documentation             Admitting Physician:  Geri Fraire MD  PCP: Damian Smiley MD    [facility] 0[facility]/0152-01  Discharging Unit Phone Number: [phone]    Emergency Contact:   Extended Emergency Contact Information  Primary Emergency Contact: Marielos Arias  Address: [address]           [phone]  LOCAL [address]  Mobile Phone: [phone]  Relation: Spouse  Secondary Emergency Contact: Gildardo Arias  Mobile Phone: [phone]  Relation: Child    Past Surgical History:  Past Surgical History:   Procedure Laterality Date    BACK SURGERY  1972    lumbar    EYE SURGERY Right 2013    cataract extraction with IOL implant    EYE SURGERY Left 13    phaco with IOL       Immunization History:     There is no immunization history on file for this patient.    Active Problems:  Patient Active Problem List   Diagnosis Code    Acute CVA (cerebrovascular accident) ([facility]) I63.9    Bradycardia R00.1       Isolation/Infection:   Isolation            No Isolation          Patient Infection Status       None to display            Nurse Assessment:  Last Vital Signs: BP (!) 159/67   Pulse 60   Temp 97.8 °F (36.6 °C) (Oral)   Resp 16   Ht 1.829 m (6')   Wt 86.3 kg (190 lb 3.2 oz)   SpO2 96%   BMI 25.80 kg/m²     Last documented pain score (0-10 scale): Pain Level: 0  Last Weight:   Wt Readings from Last 1 Encounters:   24 86.3 kg (190 lb 3.2 oz)     Mental Status:  oriented and alert    IV Access:  - None    Nursing Mobility/ADLs:  Walking   Independent  Transfer  Assisted  Bathing  Assisted  Dressing  Assisted  Toileting  Assisted  Feeding   or Other Treatments After Discharge:   - Continue PT, OT, and Speech  - Follow up with Neurology about your stroke  - Arrange to see a Sleep Medicine doctor to evaluate for sleep apnea  - Follow up with your family doctor  - No driving until cleared by a physician     Physician Certification: I certify the above information and transfer of Akbar Arias  is necessary for the continuing treatment of the diagnosis listed and that he requires Home Care for less 30 days.     Update Admission H&P: No change in H&P    PHYSICIAN SIGNATURE:  Electronically signed by Geri Fraire MD on 11/8/24 at 11:33 AM EST

## 2024-11-08 NOTE — PROGRESS NOTES
[facility]: ACUTE REHAB UNIT  SPEECH-LANGUAGE PATHOLOGY      [x] Daily  [] Weekly Care Conference Note  [x] Discharge    Patient:Akbar Arias      :1935  MRN:[id number]  Rehab Dx/Hx: Acute CVA (cerebrovascular accident) (HCC) [I63.9]  Bradycardia [R00.1]    Precautions: falls  Home situation: lives with wife. Indep with med management. Wife manages finances.  ST Dx: [] Aphasia  [] Dysarthria  [] Apraxia   [] Oropharyngeal dysphagia [x] Cognitive Impairment  [] Other:   Date of Admit: 10/27/2024  Room #: 0152/0152-01    Current functional status (updated daily):         Pt being seen for : [] Speech/Language Treatment  [] Dysphagia Treatment [x] Cognitive Treatment  [] Other:  Communication: []WFL  [] Aphasia  [] Dysarthria  [] Apraxia  [] Pragmatic Impairment [] Non-verbal  [x] Hearing Loss  [x] Other: min expressive / receptive language deficits   Cognition: [] WFL  [] Mild  [] Moderate  [x] Severe [] Unable to Assess  [] Other:  Memory: [] WFL  [] Mild  [] Moderate  [x] Severe [] Unable to Assess  [] Other:  Behavior: [x] Alert  [x] Cooperative  []  Pleasant  [] Confused  [] Agitated  [] Uncooperative  [] Distractible [] Motivated  [x] Self-Limiting [] Anxious  [] Other:  Endurance:  [x] Adequate for participation in SLP sessions  [] Reduced overall  [] Lethargic  [] Other:  Safety: [] No concerns at this time  [x] Reduced insight into deficits  [x]  Reduced safety awareness [] Not following call light procedures  [] Unable to Assess  [] Other:    Current Diet Order:ADULT ORAL NUTRITION SUPPLEMENT; Breakfast; Diabetic Oral Supplement  ADULT DIET; Regular; 4 carb choices (60 gm/meal)  Swallowing Precautions: general aspiration precautions         Date: 2024       Tx session 1  4088-2132 Tx session 2  2164-4021 DISCHARGE SUMMARY   Total Timed Code Min 30 30    Total Treatment Minutes 30 30    Individual Treatment Minutes 30 30    Group Treatment Minutes 0 0    Co-Treat Minutes 0 0    Variance/Reason:  N/A  difficulty this date      Reasoning - Similarities 5 - 8  6 WFL Initial eval score: 5; improvement this date     Reasoning - Judgement  4 - 6 5 WFL Initial eval score: 4; improvement this date       Barriers toward progress: Cognitive deficit, Impulsivity, Limited safety awareness, Decreased motivation, and Limited insight into deficits  Discharge recommendations:  [] Home independently  [] Home with assistance [x]  24 hour supervision  [] ECF [x] Other: see PT/OT recs  Continued Tx Upon Discharge: ? [x] Yes [] No [] TBD based on progress while on ARU [] Vital Stim indicated [] Other:   Estimated discharge date: 11/09/24    Interventions used this date:  [] Speech/Language Treatment  [] Instruction in HEP [] Group [] Dysphagia Treatment [x] Cognitive Treatment   [] Other:      Total Time Breakdown / Charges    Time in Time out Total Time / units   Cognitive Tx 1100  1230 1130  1300 30 min / 2 units  30 min / 2 units    Speech Tx      Dysphagia Tx          Electronically Signed by     Gogo Benitez MA CCC-SLP #[id number]  Speech Language Pathologist

## 2024-11-08 NOTE — PROGRESS NOTES
Occupational Therapy  Facility/Department: [facility]  Rehabilitation Occupational Therapy Daily Treatment Note    Date: 24  Patient Name: Akbar Arias       Room: 0152/0152-01  MRN: [id number]  Account: [id number]   : 1935  (88 y.o.) Gender: male            Pt was bathed during OT session this date. Pt was Showered with soap/water with Supervision.          Past Medical History:  has a past medical history of Hyperlipidemia and Hypertension.  Past Surgical History:   has a past surgical history that includes back surgery (); eye surgery (Right, 2013); and eye surgery (Left, 13).    Restrictions  Restrictions/Precautions: Fall Risk, General Precautions  Other position/activity restrictions: up with assistance, poor L/R distinction, impulsive    Subjective  Subjective: Pt in bed, spouse present for training, no pain, agreeable to OT session and ADL, /67, HR 61, O2 sats 96% on RA.  Restrictions/Precautions: Fall Risk;General Precautions             Objective     Cognition  Overall Cognitive Status: Exceptions  Arousal/Alertness: Appears intact  Following Commands: Follows one step commands with repetition;Follows one step commands with increased time;Inconsistently follows commands  Attention Span: Attends with cues to redirect;Difficulty dividing attention;Difficulty attending to directions  Memory: Decreased recall of recent events;Decreased short term memory;Decreased recall of biographical Information  Safety Judgement: Decreased awareness of need for assistance;Decreased awareness of need for safety  Problem Solving: Assistance required to correct errors made;Assistance required to identify errors made;Assistance required to implement solutions;Assistance required to generate solutions;Decreased awareness of errors  Insights: Decreased awareness of deficits  Initiation: Requires cues for all  Sequencing: Requires cues for some  Orientation  Overall Orientation Status:

## 2024-11-08 NOTE — PROGRESS NOTES
Occupational Therapy  Discharge Summary     Name:Akbar Arias  MRN:[id number]  :1935     Diagnosis: CVA    Restrictions/Precautions  Restrictions/Precautions: Fall Risk, General Precautions           Position Activity Restriction  Other position/activity restrictions: up with assistance, poor L/R distinction, impulsive     Goals:   Short Term Goals  Time Frame for Short Term Goals: 6 days- 24  Short Term Goal 1: Pt will complete toileting with SPV. GOAL MET 24 Pt completed toileting with SPV.  Short Term Goal 2: Pt will perform full body dressing with SPV. GOAL MET 24 Pt performed full body dressing with SPV.  Short Term Goal 3: Pt will complete full body bathing with SBA. GOAL MET 10/31/24 Pt completed full body bathing with SBA.  Short Term Goal 4: Pt will perform grooming at sink with SPV. GOAL MET 24 Pt performed grooming with SPV.  Long Term Goals  Time Frame for Long Term Goals : 2 weeks- 24  Long Term Goal 1: Pt will complete functional transfers with mod I. Goal Not Met Pt requires SBA/CGA for functional transfers.  Long Term Goal 2: Pt will perform toileting with mod I. Goal Not Met. Pt requires SPV for toileting.  Long Term Goal 3: Pt will complete full body dressing with mod I. Goal Not Met. Pt requires SPV for FB dressing and at times min A for UB dressing.  Long Term Goal 4: Pt will perform full body bathing with SPV. GOAL MET 24 Pt performed full body bathing with SPV.  Long Term Goal 5: Pt will complete grooming at sink with mod I. Goal Not Met. Pt requires SPV for grooming at sink with cues.    Pt. Met 4/4 short term goals and 1/5 long term goals. Pt will have assistance from family as needed for ADLs and transfers. Family has been educated on assist level and cues which pt requires to complete ADLs and transfers safely. Family has verbalized understanding of all recommendations.    ADL:  Feeding  Assistance Level: Independent  Grooming/Oral

## 2024-11-08 NOTE — CARE COORDINATION
CM update: Spoke with Patient and wife at bedside. Wife reports family training went well. They report they feel good about discharging home tomorrow. They plan on doing OP therapy at [facility] location- script sent electronically as well as a paper copy provided to wife. Wife reports Enrique from [employer] has already delivered RW for patient to take home. Wife confirms that grandson will be providing transportation home. No further questions/concerns from patient or wife. I reinforced that the nurse will be providing discharge instructions related to medications and follow-up appointments. Wife is aware that nursing will provide instruction related to heart monitor. I anticipate no further needs from Case Management.    Spoke with Hyun HO and made aware that patient and wife need heart monitor instruction before discharging home.    Sara Quach, RN

## 2024-11-08 NOTE — CARE COORDINATION
Case Management Discharge Summary  [facility] - Acute Rehab Unit    Patient:Akbar Arias     Rehab Dx/Hx: Acute CVA (cerebrovascular accident) (HCC) [I63.9]  Bradycardia [R00.1]       Today's Date: 11/09/2024    Discharge to:  Home with Outpatient Therapy at [facility]   Pre-certification completed:  [] No       [] Yes     [x] N/A   Hospital Exemption Notification (HENS) completed:  [] No       [] Yes     [x] N/A   IMM given:  Yes 11/8/24       New Durable Medical Equipment ordered/agency:  RW provided by Enrique through [employer]   Transportation:  Medical Transport explained to pt/family. Pt/family voice no agency preference.    Agency used: private car via grandson  Ambulance form completed: [] No       [] Yes   [x] N/A       Confirmed discharge plan with:  Patient: [] No       [x] Yes  Family:  [] No       [x] Yes      Name:wife Marielos Arias  Contact number: [phone]  Facility/Agency, name:[facility]  -script sent electronically as well as paper copy provided to wife  Phone number for report to facility: N/A  RN, name: Hyun HO       Has lack of transportation kept you from medical appointments, meetings, work, or ADL's? [] Yes, it has kept me from medical appointments or from getting my meds  [] Yes, It has kept me from non-medical meetings, appointments, work, or getting things I need  [x] No  [] Pt unable to respond  [] Pt declines to respond     How often do you need to have someone help you when you read instructions, pamphlets, or other written material from your doctor or pharmacy? [] Never                             [x] Always  [] Rarely                            [] Patient declines to respond  [] Sometimes                    [] Patient unable to respond  [] Often       Note: Discharging nurse to complete SAADIA, reconcile AVS, and place final copy with patient's discharge packet. RN to ensure that written prescriptions for  Level II

## 2024-11-08 NOTE — PLAN OF CARE
Assess knowledge and continue to work with client until date of discharge and reinforce teaching.

## 2024-11-08 NOTE — PROGRESS NOTES
Akbar Arias  11/8/2024  [id number]    Chief Complaint: Acute CVA (cerebrovascular accident) (HCC)    Subjective:   No acute events overnight. Today Ed is seen in hs room without family present. He reports feeling well and denies acute complaints at this time. His family is coming in for family training later today. He is looking forward to discharge home tomorrow.     Personally reviewed labs.     ROS: denies f/c, n/v, cp, sob    Objective:  Patient Vitals for the past 24 hrs:   BP Temp Temp src Pulse Resp SpO2   11/08/24 1018 (!) 159/67 97.8 °F (36.6 °C) Oral 60 16 96 %   11/08/24 0552 (!) 128/59 -- -- -- -- --   11/07/24 2105 (!) 158/61 97.9 °F (36.6 °C) Oral 60 16 97 %   11/07/24 2104 (!) 158/61 -- -- -- -- --   11/07/24 1525 91/61 -- -- -- -- --   11/07/24 1523 (!) 89/56 -- -- -- -- --     Gen: No distress, very pleasant.   HEENT: Normocephalic, atraumatic.   CV: RRR  Resp: No respiratory distress. Lungs CTAB  Abd: Soft, nondistended  Ext: No edema.   Neuro: Alert, oriented, appropriately interactive. Speech fluent  Psych: appropriate mood and affect    Wt Readings from Last 3 Encounters:   11/03/24 86.3 kg (190 lb 3.2 oz)   06/04/18 108.4 kg (239 lb)   01/22/18 111.6 kg (246 lb)       Laboratory data:   Lab Results   Component Value Date    WBC 4.7 11/06/2024    HGB 11.2 (L) 11/06/2024    HCT 34.2 (L) 11/06/2024    MCV 95.8 11/06/2024     11/06/2024       Lab Results   Component Value Date/Time     11/06/2024 06:30 AM    K 4.2 11/06/2024 06:30 AM     11/06/2024 06:30 AM    CO2 21 11/06/2024 06:30 AM    BUN 31 11/06/2024 06:30 AM    CREATININE 1.6 11/06/2024 06:30 AM    GLUCOSE 108 11/06/2024 06:30 AM    CALCIUM 9.4 11/06/2024 06:30 AM        Therapy progress:  Physical therapy:  Bed Mobility:     Sit>supine:  Assistance Level: Modified independent  Skilled Clinical Factors: HOB elevated  Supine>sit:  Assistance Level: Modified independent  Skilled Clinical Factors: HOB  melatonin provided prn.      PPX  DVT: heparin  GI: not indicated     Follow up appointments: PCP    Therapy progress: pending therapies today  Services: OP PT, OT,ST; 24 hour assistance  DME: GABRIEL  EDOD: 11/9    Geri Fraire MD  11/8/2024, 11:29 AM

## 2024-11-08 NOTE — PROGRESS NOTES
Physical Therapy  Facility/Department: [facility]  Rehabilitation Physical Therapy Treatment Note    NAME: Akbar Arias  : 1935 (88 y.o.)  MRN: [id number]  CODE STATUS: Full Code    Date of Service: 24       Restrictions:  Restrictions/Precautions: Fall Risk, General Precautions  Position Activity Restriction  Other position/activity restrictions: up with assistance, poor L/R distinction, impulsive     SUBJECTIVE  Subjective  Subjective: Pt in gym with OT upon arrival, son and grandson present for family training  Pain: none reported throughout session    OBJECTIVE  Cognition  Overall Cognitive Status: Exceptions  Arousal/Alertness: Appears intact  Following Commands: Follows one step commands with repetition;Follows one step commands with increased time;Inconsistently follows commands  Attention Span: Attends with cues to redirect;Difficulty dividing attention;Difficulty attending to directions  Memory: Decreased recall of recent events;Decreased short term memory;Decreased recall of biographical Information  Safety Judgement: Decreased awareness of need for assistance;Decreased awareness of need for safety  Problem Solving: Assistance required to correct errors made;Assistance required to identify errors made;Assistance required to implement solutions;Assistance required to generate solutions;Decreased awareness of errors  Insights: Decreased awareness of deficits  Initiation: Requires cues for all  Sequencing: Requires cues for some  Orientation  Overall Orientation Status: Impaired  Orientation Level: Oriented to place;Disoriented to time;Oriented to person;Oriented to situation    Functional Mobility  Roll Left  Assistance Level: Independent  Roll Right  Assistance Level: Independent  Sit to Supine  Assistance Level: Independent  Skilled Clinical Factors: HOB flat, without use of bedrail  Supine to Sit  Assistance Level: Independent  Skilled Clinical Factors: HOB flat, without use of  11/08/24 at 3:52 PM

## 2024-11-09 VITALS
RESPIRATION RATE: 18 BRPM | BODY MASS INDEX: 25.76 KG/M2 | OXYGEN SATURATION: 94 % | TEMPERATURE: 97.9 F | SYSTOLIC BLOOD PRESSURE: 171 MMHG | WEIGHT: 190.2 LBS | DIASTOLIC BLOOD PRESSURE: 71 MMHG | HEART RATE: 63 BPM | HEIGHT: 72 IN

## 2024-11-09 LAB
GLUCOSE BLD-MCNC: 104 MG/DL (ref 70–99)
PERFORMED ON: ABNORMAL

## 2024-11-09 PROCEDURE — 6370000000 HC RX 637 (ALT 250 FOR IP): Performed by: STUDENT IN AN ORGANIZED HEALTH CARE EDUCATION/TRAINING PROGRAM

## 2024-11-09 PROCEDURE — 6360000002 HC RX W HCPCS: Performed by: STUDENT IN AN ORGANIZED HEALTH CARE EDUCATION/TRAINING PROGRAM

## 2024-11-09 RX ADMIN — METFORMIN HYDROCHLORIDE 500 MG: 500 TABLET ORAL at 08:51

## 2024-11-09 RX ADMIN — SENNOSIDES AND DOCUSATE SODIUM 1 TABLET: 50; 8.6 TABLET ORAL at 08:51

## 2024-11-09 RX ADMIN — AMLODIPINE BESYLATE 10 MG: 5 TABLET ORAL at 08:51

## 2024-11-09 RX ADMIN — CLOPIDOGREL BISULFATE 75 MG: 75 TABLET ORAL at 08:51

## 2024-11-09 RX ADMIN — HEPARIN SODIUM 5000 UNITS: 5000 INJECTION INTRAVENOUS; SUBCUTANEOUS at 04:44

## 2024-11-09 RX ADMIN — ASPIRIN 325 MG: 325 TABLET, COATED ORAL at 08:51

## 2024-11-09 RX ADMIN — VALSARTAN 80 MG: 80 TABLET, FILM COATED ORAL at 08:51

## 2024-11-09 RX ADMIN — HYDRALAZINE HYDROCHLORIDE 25 MG: 25 TABLET ORAL at 04:45

## 2024-11-09 ASSESSMENT — PAIN SCALES - GENERAL: PAINLEVEL_OUTOF10: 0

## 2024-11-09 NOTE — PROGRESS NOTES
[facility] Discharge Assessment  [facility] Acute Rehab Unit    Patient:Akbar Arias     Rehab Dx/Hx: Acute CVA (cerebrovascular accident) (HCC) [I63.9]  Bradycardia [R00.1]   :1935  MRN:[id number]  Date of Admit: 10/27/2024     Pain Effect on Sleep:  Over the past 5 days, how much of the time has pain made it hard for you to sleep at night?  []  0. Does not apply - I have not had any pain or hurting in the past 5 days  [x]  1. Rarely or not at all  []  2. Occasionally  []  3. Frequently  []  4. Almost constantly  []  8. Unable to answer    Over the past 5 days, how often have you limited your participation in rehabilitation therapy sessions due to pain?  []  0. Does not apply - I have not received rehabilitation therapy in the past 5 days  [x]  1. Rarely or not at all  []  2. Occasionally  []  3. Frequently  []  4. Almost constantly  []  8. Unable to answer    Pain Interference with Day-to-Day Activities:  Over the past 5 days, how often have you limited your day-to-day activities (excluding rehabilitation therapy session)?  [x]  1. Rarely or not at all  []  2. Occasionally  []  3. Frequently  []  4. Almost constantly  []  8. Unable to answer      High-Risk Drug Classes: Use and Indication   Is taking: Check if the pt is taking any medications by pharmacological classification  Indication noted: If column 1 is checked, check if there is an indication noted for all meds in the drug class Is taking  (check all that apply) Indication noted (check all that apply)   Antipsychotic [] []   Anticoagulant [x] [x]   Antibiotic [] []   Opioid [] []   Antiplatelet [x] [x]   Hypoglycemic (including insulin) [] []   None of the above [] []     Special Treatments, Procedures, and Programs   Check all of the following treatments, procedures, and programs that apply on discharge.  On discharge (check all that apply)   Cancer Treatments    A1. Chemotherapy []   A2. IV []   A3. Oral []   A10. Other []   B1. Radiation []

## 2024-11-09 NOTE — PROGRESS NOTES
ACUTE REHAB UNIT: DISCHARGE  [facility]    Patient:Akbar Arias     Rehab Dx/Hx: Acute CVA (cerebrovascular accident) (HCC) [I63.9]  Bradycardia [R00.1]   :1935  MRN:[id number]  Date of Admit: 10/27/2024   Date of Discharge: 2024    Subjective:   Order for patient discharge.  Reviewed discharge summary (AVS) with patient and family including medications, physical instructions (safety) and diet. Pt in stable condition.     Reconciled Medication List - Patient:   Medications were reviewed by RN at time of discharge with patient and/or family:  []  Via EMR   []  Via health information exchange  [x]  Verbal (e.g. in person, telephone, video conferencing)  [x]  Paper-based (e.g. fax, copies, printouts)   []  Other Methods (e.g. texting, email, CDs)    Reconciled Medication List - Subsequent provider:   [] No, current reconciled medication list not provided to the subsequent provider.  [] Yes, current reconciled medication list provided to the subsequent provider.   [x] Via EMR    [] Via health information exchange  [] Verbal (e.g. in person, telephone, video conferencing)  [] Paper-based (e.g. fax, copies, printouts)   [] Other Methods (e.g. texting, email, CDs)    Discharge disposition:  Pt was discharged via:  [x]  Wheelchair  []  Stretcher  []  Safe ambulation and use of assistive device  []  Other:     Pt was discharged to:  [x]  Private car  []  Transportation service to next destination  []  Other:     Discharge destination:  [x]  Home  []  Skilled Nursing Facility  []  Long Term Care  []  Other:        Discharge BIMS:  Number of word repeated after first attempt:  []  0. None []  1. One [x]  2. Two []  3. Three    Able to report correct year:  [x]  0. Missed by >5 years, or no answer  []  1. Missed by 2-5 years  []  2. Missed by 1 year  []  3. Correct    Able to report correct month:   [x]  0. Missed by >1 month, or no answer  []  1. Missed by 6 days to 1 month  []  2.  Signature:  Kimberlee MCCULLOUGH RN

## 2024-11-11 NOTE — DISCHARGE SUMMARY
Physical Medicine & Rehabilitation  Discharge Summary     Patient Identification:  Akbar Arias  : 1935  Admit date: 10/27/2024  Discharge date:  24  Attending provider: Katherin Izaguirre DO        Primary care provider: Damian Smiley MD     Discharge Diagnoses:   Active Hospital Problems    Diagnosis     Bradycardia [R00.1]     Acute CVA (cerebrovascular accident) (HCC) [I63.9]      History of Present Illness/Acute Hospital Course:  Akbar Arias is an 88 year old male with a past medical history significant for HTN and HLD who presented to [facility] on 10/20/24 with 3-4 weeks of RUE weakness and decreased responsiveness with aphasia. CT Head showed hypoattenuation in the left frontal lobe concerning for early MCA CVA, late acute to subacute left parietal infarct, age-indeterminate right thalamic lacunar infarct, small low-density left cerebral convexity subdural collection, and small right posterior scalp hematoma. CTA head and neck showed distal M2 occlusion and left ICA thrombus. MRI brain showed left frontoparietal CVA. He was not a candidate for tPA due to being outside of the window. Course was notable for HTN and JUAQUIN. He was admitted to [facility] on 10/27 due to functional deficits below his baseline. Today he is seen with his wife present. He reports continued right arm weakness. He reports previously being independent. He lives with his wife, son, and grandson. They live in a 2 level house, but he stays on the main floor. There are 2 ALIA. 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     Inpatient Rehabilitation Course:   Akbar Arias is a 88 y.o. male admitted to inpatient rehabilitation on 10/27/2024 with Acute CVA (cerebrovascular accident) (HCC).  The patient participated in an aggressive multidisciplinary inpatient rehabilitation program involving 3 hours of therapy per day, at least 5 days per week.     Acute discharge: Patient with intermittent bradycardia with heart rates down 
follow-up with PCP, Neurology, Sleep Medicine.     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   - external heart monitor per Cardiology     Constipation  - continue bowel regimen     Prediabetes  - HbA1c 5.9  - metformin   - SSI while admitted     CKD Stage 3  - unclear baseline Cr  - 1.4 on discharge from   - Cr relatively stable  - follow up with PCP     Suspected ROME  - needs sleep study as outpatient    Discharge Exam:  From 11/8  Gen: No distress, very pleasant.   HEENT: Normocephalic, atraumatic.   CV: RRR  Resp: No respiratory distress. Lungs CTAB  Abd: Soft, nondistended  Ext: No edema.   Neuro: Alert, oriented, appropriately interactive. Speech fluent  Psych: appropriate mood and affect     Discharge Functional Status:    Physical therapy:  Bed Mobility:     Sit>supine:  Assistance Level: Independent  Skilled Clinical Factors: HOB flat, without use of bedrail  Supine>sit:  Assistance Level: Independent  Skilled Clinical Factors: HOB flat, without use of bedrail  Transfers:  Surface: From chair with arms, To chair with arms, To chair without arms, From chair without arms  Additional Factors: Verbal cues, Increased time to complete, Hand placement cues  Device: Walker  Sit>stand:  Assistance Level: Independent  Skilled Clinical Factors: intermittent cues for safe hand placement  Stand>sit:  Assistance Level: Independent  Skilled Clinical Factors: VC for hand placement  Bed<>chair  Technique: Stand

## 2024-11-19 ENCOUNTER — HOSPITAL ENCOUNTER (OUTPATIENT)
Dept: SPEECH THERAPY | Age: 89
Setting detail: THERAPIES SERIES
Discharge: HOME | End: 2024-11-19
Attending: STUDENT IN AN ORGANIZED HEALTH CARE EDUCATION/TRAINING PROGRAM
Payer: MEDICARE

## 2024-11-19 PROCEDURE — 96125 COGNITIVE TEST BY HC PRO: CPT

## 2024-11-19 NOTE — PLAN OF CARE
(W=Write it down/Set alarm in your phone, R=Repeat/Recite, A=Association, P=Picture it/Visualization).  - Activities to improve his memory recall were given.    Education re: results and recommendations from evaluation, including plan of care with therapeutic goals.   Pt verbalized understanding and agreement.    GOALS     Patient stated goal: To improve his memory and calculations  [] Progressing: [] Met: [] Not Met: [] Adjusted    Therapist goals for Patient:   Short Term Goals:   1. The pt will recall 3 pieces of new information after a 5 minute delay, max cues to encode and no cues to recall  [] Progressing: [] Met: [] Not Met: [] Adjusted    2. The pt will complete graded attention based tasks with 90% accuracy min-mod cues  [] Progressing: [] Met: [] Not Met: [] Adjusted    3. The pt will complete functional math based tasks with 90% accuracy, mod cues  [] Progressing: [] Met: [] Not Met: [] Adjusted    Long Term Goals:   1.Patient will demonstrate improved cognitive-linguistic function to highest functional level as demonstrated by improved score on Neuro-QOL Cognitive Function Short Form (Sergo 2014)  [] Progressing: [] Met: [] Not Met: [] Adjusted        Total Treatment Time / Charges     Time in Time out Total Time / units   Standardized Cognitive Performance Testing 1300  1400  60 min / 1 unit    Swallow Eval         Behav Qualitative Analysis of Voice      Eval Speech Sound Production      Cognitive Tx      Speech Tx      Dysphagia Tx               Electronically Signed by YUNG CAPPS  Date: 11/19/2024     Electronically Signed by:   Hugh Chambers MA, CCC-SLP  SP#[id number]  Speech-Language Pathologist  Phone #: [phone]  Fax #: [phone]      Note: If patient does not return for scheduled/ recommended follow up visits, this note will serve as a discharge from care along with most recent update on progress.

## 2024-11-21 ENCOUNTER — HOSPITAL ENCOUNTER (OUTPATIENT)
Dept: SPEECH THERAPY | Age: 89
Setting detail: THERAPIES SERIES
Discharge: HOME | End: 2024-11-21
Attending: STUDENT IN AN ORGANIZED HEALTH CARE EDUCATION/TRAINING PROGRAM
Payer: MEDICARE

## 2024-11-21 PROCEDURE — 97130 THER IVNTJ EA ADDL 15 MIN: CPT

## 2024-11-21 PROCEDURE — 97129 THER IVNTJ 1ST 15 MIN: CPT

## 2024-11-21 NOTE — FLOWSHEET NOTE
[facility]- Outpatient Rehabilitation and Therapy  [address] [place] [address] office: [phone] fax: [phone]    Speech Therapy: Daily Note   Patient: Akbar Arias (88 y.o. male)   Examination Date: 2024   :  1935 MRN: [id number]   Visit #: 2  Insurance Allowable Auth Needed    [x]Yes    []No    Insurance: Payor: [facility] MEDICARE / Plan: [facility] MEDICARE COMPLETE / Product Type: *No Product type* /   Insurance ID: [id number] - (Medicare Managed) 12 visits approved 24 to 24  Secondary Insurance (if applicable):    Treatment Diagnosis:  Cognitive Deficits [I69.319]    Medical Diagnosis:  Acute CVA (cerebrovascular accident) (HCC) [I63.9]   Referring Physician: Geri Fraire MD  PCP: Damian Smiley MD     Plan of care signed: YES    Functional Outcome Measure: Neuro-QOL Item Bank v2.0 - Cognition Function- Short Form (Sergo 2014):       Progress Report/POC: NO  POC update due: (10 visits /OR 30 days /OR duration of POC, whichever is less): 2/10 or 24    Precautions/ Contra-indications:   Latex allergy: No  Pacemaker: No  Other:  Significant cognitive deficits       Preferred Language for Healthcare:   [x]English       []other:    SUBJECTIVE EXAMINATION     Patient Report/Comments:   The pt was joined by his wife and grandson. All were in overall good spirits. His The [facility] Memory Test-Third Edition (RBMT3) and MOCA BLIND results from last session were reviewed at length.    Location: Pt seen in therapy office  Demeanor: Pleasant  and Cooperative  Motivated: []  Yes  []  No   Caregivers Present: [x]  Yes  []  No       OBJECTIVE EXAMINATION   Exercises/Interventions:   Cognitive Function (91242 & 09436) Accuracy Cues Notes   Orientation        Attention           Sustained         Selective         Alternating - Alternating attention task requiring the pt to look at 20 given note cards with different kinds of fruits written on

## 2024-11-25 ENCOUNTER — APPOINTMENT (OUTPATIENT)
Dept: OCCUPATIONAL THERAPY | Age: 89
End: 2024-11-25
Attending: STUDENT IN AN ORGANIZED HEALTH CARE EDUCATION/TRAINING PROGRAM
Payer: MEDICARE

## 2024-12-02 ENCOUNTER — HOSPITAL ENCOUNTER (OUTPATIENT)
Dept: PHYSICAL THERAPY | Age: 89
Setting detail: THERAPIES SERIES
Discharge: HOME | End: 2024-12-02
Attending: STUDENT IN AN ORGANIZED HEALTH CARE EDUCATION/TRAINING PROGRAM
Payer: MEDICARE

## 2024-12-02 ENCOUNTER — HOSPITAL ENCOUNTER (OUTPATIENT)
Dept: SPEECH THERAPY | Age: 89
Setting detail: THERAPIES SERIES
Discharge: HOME | End: 2024-12-02
Attending: STUDENT IN AN ORGANIZED HEALTH CARE EDUCATION/TRAINING PROGRAM
Payer: MEDICARE

## 2024-12-02 DIAGNOSIS — R29.898 BILATERAL LEG WEAKNESS: ICD-10-CM

## 2024-12-02 DIAGNOSIS — R68.89 IMPAIRED TOLERANCE OF ACTIVITY: ICD-10-CM

## 2024-12-02 DIAGNOSIS — R26.89 IMPAIRED GAIT AND MOBILITY: Primary | ICD-10-CM

## 2024-12-02 PROCEDURE — 97130 THER IVNTJ EA ADDL 15 MIN: CPT

## 2024-12-02 PROCEDURE — 97162 PT EVAL MOD COMPLEX 30 MIN: CPT

## 2024-12-02 PROCEDURE — 97129 THER IVNTJ 1ST 15 MIN: CPT

## 2024-12-02 PROCEDURE — 97530 THERAPEUTIC ACTIVITIES: CPT

## 2024-12-02 NOTE — FLOWSHEET NOTE
[facility]- Outpatient Rehabilitation and Therapy  [address] [place] [address] office: [phone] fax: [phone]    Speech Therapy: Daily Note   Patient: Akbar Arias (89 y.o. male)   Examination Date: 2024   :  1935 MRN: [id number]   Visit #: 3  Insurance Allowable Auth Needed    [x]Yes    []No    Insurance: Payor: [facility] MEDICARE / Plan: [facility] MEDICARE COMPLETE / Product Type: *No Product type* /   Insurance ID: [id number] - (Medicare Managed) 12 visits approved 24 to 24  Secondary Insurance (if applicable):    Treatment Diagnosis:  Cognitive Deficits [I69.319]    Medical Diagnosis:  Acute CVA (cerebrovascular accident) (HCC) [I63.9]   Referring Physician: Geri Fraire MD  PCP: Damian Smiley MD     Plan of care signed: YES    Functional Outcome Measure: Neuro-QOL Item Bank v2.0 - Cognition Function- Short Form (Sergo 2014):       Progress Report/POC: NO  POC update due: (10 visits /OR 30 days /OR duration of POC, whichever is less): 3/10 or 24    Precautions/ Contra-indications:   Latex allergy: No  Pacemaker: No  Other:  Significant cognitive deficits       Preferred Language for Healthcare:   [x]English       []other:    SUBJECTIVE EXAMINATION     Patient Report/Comments:   The pt was joined by his grandson. Both were in good spirits.     Location: Pt seen in therapy office  Demeanor: Pleasant  and Cooperative  Motivated: []  Yes  []  No   Caregivers Present: [x]  Yes  []  No       OBJECTIVE EXAMINATION   Exercises/Interventions:   Cognitive Function (29698 & 30182) Accuracy Cues Notes   Orientation        Attention           Sustained         Selective         Alternating         Divided Task requiring him to listen to a new story while also keeping a mental tally of the # of times a target word was said: 84% Min-mod cues    Memory          Immediate/Working         Short term Recall of 3 items from list of errands after 5 minute

## 2024-12-02 NOTE — FLOWSHEET NOTE
and/or supervision  []1 needs one person to assist  []0 needs two people to assist or supervise to be safe  Score:     6. STANDING UNSUPPORTED WITH EYES CLOSED  INSTRUCTIONS: Please close your eyes and stand still for 10 seconds.  []4 able to stand 10 seconds safely  [x]3 able to stand 10 seconds with supervision  []2 able to stand 3 seconds  []1 unable to keep eyes closed 3 seconds but stays steady  []0 needs help to keep from falling  Score:     7. STANDING UNSUPPORTED WITH FEET TOGETHER  INSTRUCTIONS: Place your feet together and stand without holding.  []4 able to place feet together independently and stand 1 minute safely  [x]3 able to place feet together independently and stand for 1 minute with  supervision  []2 able to place feet together independently but unable to hold for 30 seconds  []1 needs help to attain position but able to stand 15 seconds feet together  []0 needs help to attain position and unable to hold for 15 seconds  Score:     8. REACHING FORWARD WITH OUTSTRETCHED ARM WHILE  STANDING  INSTRUCTIONS: Lift arm to 90 degrees. Stretch out your fingers and reach  forward as far as you can. (Examiner places a ruler at end of fingertips when  arm is at 90 degrees. Fingers should not touch the ruler while reaching  forward. The recorded measure is the distance forward that the finger reaches  while the subject is in the most forward lean position. When possible, ask  subject to use both arms when reaching to avoid rotation of the trunk.).  []4 can reach forward confidently >25 cm (10 inches)  [x]3 can reach forward >12 cm safely (5 inches)  []2 can reach forward >5 cm safely (2 inches)  []1 reaches forward but needs supervision  []0 loses balance while trying/requires external support  Score:     9.  OBJECT FROM FLOOR FROM A STANDING POSITION  INSTRUCTIONS:  shoe/slipper which is placed in front of your feet.  []4 able to  slipper safely and easily  [x]3 able to  slipper but

## 2024-12-02 NOTE — PLAN OF CARE
interventions:    Interventions:  Therapeutic Exercise (92325) including: strength training, ROM, and functional mobility  Therapeutic Activities (51505) including: functional mobility training and education.  Neuromuscular Re-education (60836) activation and proprioception, including postural re-education.    Gait Training (12220) for normalization of ambulation patterns and AD training.   Modalities as needed that may include: Cryotherapy, Electrical Stimulation, and Thermal Agents  Patient education on joint protection, postural re-education, activity modification, and progression of HEP    Plan: POC initiated as per evaluation    Electronically Signed by Elsa Stallworth PT  Date: 12/02/2024      Note: Portions of this note have been templated and/or copied from initial evaluation, reassessments and prior notes for documentation efficiency.      Note: If patient does not return for scheduled/recommended follow up visits, this note will serve as a discharge from care along with the most recent update on progress.    Neuro Evaluation

## 2024-12-04 ENCOUNTER — HOSPITAL ENCOUNTER (OUTPATIENT)
Dept: SPEECH THERAPY | Age: 89
Setting detail: THERAPIES SERIES
Discharge: HOME | End: 2024-12-04
Attending: STUDENT IN AN ORGANIZED HEALTH CARE EDUCATION/TRAINING PROGRAM
Payer: MEDICARE

## 2024-12-04 ENCOUNTER — HOSPITAL ENCOUNTER (OUTPATIENT)
Dept: OCCUPATIONAL THERAPY | Age: 89
Setting detail: THERAPIES SERIES
Discharge: HOME | End: 2024-12-04
Attending: STUDENT IN AN ORGANIZED HEALTH CARE EDUCATION/TRAINING PROGRAM
Payer: MEDICARE

## 2024-12-04 ENCOUNTER — HOSPITAL ENCOUNTER (OUTPATIENT)
Dept: PHYSICAL THERAPY | Age: 89
Setting detail: THERAPIES SERIES
Discharge: HOME | End: 2024-12-04
Attending: STUDENT IN AN ORGANIZED HEALTH CARE EDUCATION/TRAINING PROGRAM
Payer: MEDICARE

## 2024-12-04 PROCEDURE — 97116 GAIT TRAINING THERAPY: CPT

## 2024-12-04 PROCEDURE — 97110 THERAPEUTIC EXERCISES: CPT

## 2024-12-04 PROCEDURE — 97130 THER IVNTJ EA ADDL 15 MIN: CPT

## 2024-12-04 PROCEDURE — 97129 THER IVNTJ 1ST 15 MIN: CPT

## 2024-12-04 PROCEDURE — 97112 NEUROMUSCULAR REEDUCATION: CPT

## 2024-12-04 PROCEDURE — 97166 OT EVAL MOD COMPLEX 45 MIN: CPT

## 2024-12-04 PROCEDURE — 97530 THERAPEUTIC ACTIVITIES: CPT

## 2024-12-04 NOTE — PLAN OF CARE
patient's age range   Box and Blocks Test: Seated 21 below mean compared to norms for patient's age range 34 below mean compared to norms for patient's age range   Handwriting Impaired Impaired letter sizing  Impaired letter spacing  Decreased legibility  Decreased functional activity tolerance to writing  Decreased speed Impaired Impaired letter sizing  Impaired letter spacing  Decreased legibility  Decreased functional activity tolerance to writing  Decreased speed     Functional Mobility    Device used: None  Level of assist: Contact guard assist    Balance    Static Sitting: Good        Dynamic Sitting: Good    Static Stance: Fair        Dynamic Stance: Poor     Observation: see above    Test measurements:      Test used Initial Assessment  12/04/2024 Progress Note   Pain Summary VAS 0          Functional questionnaire Quick DASH 61% disability          Functional outcome measures Nine Hole Peg Test (seconds)  Seated  Bilateral   R: 73  L: 34     Box and Blocks (blocks/minute)  Seated  Bilateral R: 21  L: 34      Strength (#)  Bilateral   R: 46#  L: 72.8#                                      Exercises/Interventions:     Manual Intervention (35711)             Therapeutic Exercise (14493)  Resistance Sets/Time Reps Notes/Cues/Progressions                        NMR (39785)                 Therapeutic Activity (48336)    Nut and bolts - increased cues and increased time for accuracy and attention to task       Slow sign - review to techniques for improving independence with impulsivity including verbal cues which can get lost, tactile cues for alerting, and carrying a note for improving awareness        Self-Care/Home Management (88686)                     Education: patient and caregiver  Role of OT in OP setting -  verb understanding  POC and goals - verb understanding  Purpose of assessment and various areas to be assessed - verb understanding    Modality Settings:    Home Exercise/Activities Program: HEP

## 2024-12-04 NOTE — FLOWSHEET NOTE
improve muscular strength or increase flexibility, following either an injury or surgery.   (75306) NEUROMUSCULAR RE-EDUCATION - Therapeutic procedure, 1 or more areas, each 15 minutes; neuromuscular reeducation of movement, balance, coordination, kinesthetic sense, posture, and/or proprioception for sitting and/or standing activities  (15377) GAIT RE-EDUCATION - Provided training and instruction to the patient for proper joint and muscle recruitment and positioning and eccentric body weight control with ambulation re-education including up and down stairs. Therapeutic procedure, one or more areas, each 15 minutes;     GOALS     Patient stated goal: \"Walk normally\"   [] Progressing: [] Met: [] Not Met: [] Adjusted    Therapist goals for Patient:   Short Term Goals: To be achieved in: 4 weeks   1.  Independent in HEP and progression per patient tolerance, in order to increase independence with functional mobility.  [] Progressing: [] Met: [] Not Met: [] Adjusted  2. Patient will improve 5x sit to 15 seconds to demonstrate improvement in LE muscle endurance.  [] Progressing: [] Met: [] Not Met: [] Adjusted  3. Pt to perform transfers via stand-pivot technique with use of no AD Independent  [] Progressing: [] Met: [] Not Met: [] Adjusted  4. Pt will improve TUG to 8 seconds to demonstrate improved gait speed and reduce risk for falls.  [] Progressing: [] Met: [] Not Met: [] Adjusted    Long Term Goals: To be achieved in: 8 weeks  1. Disability index score of 50% or less for the LEFS to assist with reaching prior level of function with activities such as walking 2 blocks.  [] Progressing: [] Met: [] Not Met: [] Adjusted  2. Patient will walk 5 minutes without SOB with supervision (patient specific functional goal)   [] Progressing: [] Met: [] Not Met: [] Adjusted  3. Pt will improve BETANCOURT to 45/56 to demonstrate increased safety at home and in the community and reduce risk for falls.  [] Progressing: [] Met: [] Not Met:

## 2024-12-04 NOTE — FLOWSHEET NOTE
[facility]- Outpatient Rehabilitation and Therapy  [address] [place] [address] office: [phone] fax: [phone]    Speech Therapy: Daily Note   Patient: Akbar Arias (89 y.o. male)   Examination Date: 2024   :  1935 MRN: [id number]   Visit #: 4  Insurance Allowable Auth Needed   3/12 [x]Yes    []No    Insurance: Payor: [facility] MEDICARE / Plan: [facility] MEDICARE COMPLETE / Product Type: *No Product type* /   Insurance ID: [id number] - (Medicare Managed) 12 visits approved 24 to 24  Secondary Insurance (if applicable):    Treatment Diagnosis:  Cognitive Deficits [I69.319]    Medical Diagnosis:  Acute CVA (cerebrovascular accident) (HCC) [I63.9]   Referring Physician: Geri Fraire MD  PCP: Damian Smiley MD     Plan of care signed: YES    Functional Outcome Measure: Neuro-QOL Item Bank v2.0 - Cognition Function- Short Form (Sergo 2014):       Progress Report/POC: NO  POC update due: (10 visits /OR 30 days /OR duration of POC, whichever is less): 4/10 or 24    Precautions/ Contra-indications:   Latex allergy: No  Pacemaker: No  Other:  Significant cognitive deficits       Preferred Language for Healthcare:   [x]English       []other:    SUBJECTIVE EXAMINATION     Patient Report/Comments:   The pt was again joined by his grandson. Both were in good spirits. The pt continues to complain of his difficulty with functional math.    Location: Pt seen in therapy office  Demeanor: Pleasant  and Cooperative  Motivated: []  Yes  []  No   Caregivers Present: [x]  Yes  []  No       OBJECTIVE EXAMINATION   Exercises/Interventions:   Cognitive Function (67708 & 06596) Accuracy Cues Notes   Orientation        Attention           Sustained         Selective         Alternating         Divided Task requiring him to sort playing cards and listen to given words, repeating only words that fall in 1 of 2 categories: 67% Min-mod cues    Memory          Immediate/Working

## 2024-12-05 NOTE — FLOWSHEET NOTE
tasks).  Splinting including custom or pre-fabricated    Plan: Cont per POC    Electronically Signed by Jodi Matthews  Date: 12/04/2024

## 2024-12-09 ENCOUNTER — HOSPITAL ENCOUNTER (OUTPATIENT)
Dept: PHYSICAL THERAPY | Age: 89
Setting detail: THERAPIES SERIES
Discharge: HOME | End: 2024-12-09
Attending: STUDENT IN AN ORGANIZED HEALTH CARE EDUCATION/TRAINING PROGRAM
Payer: MEDICARE

## 2024-12-09 ENCOUNTER — TELEPHONE (OUTPATIENT)
Dept: CARDIOLOGY CLINIC | Age: 89
End: 2024-12-09

## 2024-12-09 ENCOUNTER — HOSPITAL ENCOUNTER (OUTPATIENT)
Dept: SPEECH THERAPY | Age: 89
Setting detail: THERAPIES SERIES
Discharge: HOME | End: 2024-12-09
Attending: STUDENT IN AN ORGANIZED HEALTH CARE EDUCATION/TRAINING PROGRAM
Payer: MEDICARE

## 2024-12-09 PROCEDURE — 97110 THERAPEUTIC EXERCISES: CPT

## 2024-12-09 PROCEDURE — 97112 NEUROMUSCULAR REEDUCATION: CPT

## 2024-12-09 PROCEDURE — 97129 THER IVNTJ 1ST 15 MIN: CPT

## 2024-12-09 PROCEDURE — 97130 THER IVNTJ EA ADDL 15 MIN: CPT

## 2024-12-09 NOTE — TELEPHONE ENCOUNTER
JUAN M was consulted for bradycardia 11/2024. JUAN M, you recommended a 30 day monitor at DC but it does not appear that this was done. Do you want to order and have patient follow up in January 2025 once monitor has resulted or do you want patient to follow up with you sooner?

## 2024-12-09 NOTE — FLOWSHEET NOTE
Provided training and instruction to the patient for proper joint and muscle recruitment and positioning and eccentric body weight control with ambulation re-education including up and down stairs. Therapeutic procedure, one or more areas, each 15 minutes;     GOALS     Patient stated goal: \"Walk normally\"   [] Progressing: [] Met: [] Not Met: [] Adjusted    Therapist goals for Patient:   Short Term Goals: To be achieved in: 4 weeks   1.  Independent in HEP and progression per patient tolerance, in order to increase independence with functional mobility.  [] Progressing: [] Met: [] Not Met: [] Adjusted  2. Patient will improve 5x sit to 15 seconds to demonstrate improvement in LE muscle endurance.  [] Progressing: [] Met: [] Not Met: [] Adjusted  3. Pt to perform transfers via stand-pivot technique with use of no AD Independent  [] Progressing: [] Met: [] Not Met: [] Adjusted  4. Pt will improve TUG to 8 seconds to demonstrate improved gait speed and reduce risk for falls.  [] Progressing: [] Met: [] Not Met: [] Adjusted    Long Term Goals: To be achieved in: 8 weeks  1. Disability index score of 50% or less for the LEFS to assist with reaching prior level of function with activities such as walking 2 blocks.  [] Progressing: [] Met: [] Not Met: [] Adjusted  2. Patient will walk 5 minutes without SOB with supervision (patient specific functional goal)   [] Progressing: [] Met: [] Not Met: [] Adjusted  3. Pt will improve BETANCOURT to 45/56 to demonstrate increased safety at home and in the community and reduce risk for falls.  [] Progressing: [] Met: [] Not Met: [] Adjusted  4. Patient will improve 6 Min Walk Test to 1000' with no AD to demonstrate improved endurance for community ambulation.  [] Progressing: [] Met: [] Not Met: [] Adjusted    Overall Progression Towards Functional goals/ Treatment Progress Update:  [] Patient is progressing as expected towards functional goals listed.    [] Progression is slowed due to

## 2024-12-09 NOTE — TELEPHONE ENCOUNTER
Pt wife stated he's in out patent therapy and they are concerned about his heart rate for a week or more. Has been 47,54-60s. Pt has also been experiencing dizziness. Outpatient wants him seen asap. Pt saw arcadio in hospital   [phone] or [phone]

## 2024-12-09 NOTE — FLOWSHEET NOTE
[facility]- Outpatient Rehabilitation and Therapy  [address] [place] [address] office: [phone] fax: [phone]    Speech Therapy: Daily Note   Patient: Akbar Arias (89 y.o. male)   Examination Date: 2024   :  1935 MRN: [id number]   Visit #: 5  Insurance Allowable Auth Needed    [x]Yes    []No    Insurance: Payor: [facility] MEDICARE / Plan: [facility] MEDICARE COMPLETE / Product Type: *No Product type* /   Insurance ID: [id number] - (Medicare Managed) 12 visits approved 24 to 24  Secondary Insurance (if applicable):    Treatment Diagnosis:  Cognitive Deficits [I69.319]    Medical Diagnosis:  Acute CVA (cerebrovascular accident) (HCC) [I63.9]   Referring Physician: Geri Fraire MD  PCP: Damian Smiley MD     Plan of care signed: YES    Functional Outcome Measure: Neuro-QOL Item Bank v2.0 - Cognition Function- Short Form (Sergo 2014):       Progress Report/POC: NO  POC update due: (10 visits /OR 30 days /OR duration of POC, whichever is less): 5/10 or 24    Precautions/ Contra-indications:   Latex allergy: No  Pacemaker: No  Other:  Significant cognitive deficits       Preferred Language for Healthcare:   [x]English       []other:    SUBJECTIVE EXAMINATION     Patient Report/Comments:   The pt was again joined by his grandson like usual. The pt reported feeling tired from having PT prior. He stated that he continues to struggle with functional math.    Location: Pt seen in therapy office  Demeanor: Pleasant  and Cooperative  Motivated: []  Yes  []  No   Caregivers Present: [x]  Yes  []  No       OBJECTIVE EXAMINATION   Exercises/Interventions:   Cognitive Function (89757 & 34224) Accuracy Cues Notes   Orientation        Attention           Sustained         Selective         Alternating - Mental trail making task requiring the pt to count from 1 to 12 while also saying a corresponding letter of the alphabet in order (1-A to 12-1): 33% (4)

## 2024-12-10 NOTE — TELEPHONE ENCOUNTER
Desiree Mai MD Defillippo, Amanda J RN; [facility] Ep10 hours ago (9:07 PM)       We recommended an event monitor after seeing him in hospital. Appears he wore an event monitor from . See Grady note from November 7. Please see if we can get that report and review it. Further action will depend on findings. If no event monitor report can be obtained, then recommend repeat event monitor with one of ours.    Thank you   Carol, are you able to obtain the cardiac event monitor report from ?

## 2024-12-10 NOTE — TELEPHONE ENCOUNTER
LMOV asking for call back from patient to inform them of plan per KXA. KXA would like to review monitor first and then go from there.

## 2024-12-11 ENCOUNTER — HOSPITAL ENCOUNTER (OUTPATIENT)
Age: 89
Setting detail: OBSERVATION
Discharge: HOME | End: 2024-12-13
Attending: EMERGENCY MEDICINE | Admitting: STUDENT IN AN ORGANIZED HEALTH CARE EDUCATION/TRAINING PROGRAM
Payer: MEDICARE

## 2024-12-11 ENCOUNTER — HOSPITAL ENCOUNTER (OUTPATIENT)
Dept: OCCUPATIONAL THERAPY | Age: 89
Setting detail: THERAPIES SERIES
Discharge: HOME | End: 2024-12-11
Attending: STUDENT IN AN ORGANIZED HEALTH CARE EDUCATION/TRAINING PROGRAM
Payer: MEDICARE

## 2024-12-11 ENCOUNTER — APPOINTMENT (OUTPATIENT)
Dept: GENERAL RADIOLOGY | Age: 89
End: 2024-12-11
Payer: MEDICARE

## 2024-12-11 ENCOUNTER — HOSPITAL ENCOUNTER (OUTPATIENT)
Dept: SPEECH THERAPY | Age: 89
Setting detail: THERAPIES SERIES
End: 2024-12-11
Attending: STUDENT IN AN ORGANIZED HEALTH CARE EDUCATION/TRAINING PROGRAM
Payer: MEDICARE

## 2024-12-11 ENCOUNTER — HOSPITAL ENCOUNTER (OUTPATIENT)
Dept: PHYSICAL THERAPY | Age: 89
Setting detail: THERAPIES SERIES
Discharge: HOME | End: 2024-12-11
Attending: STUDENT IN AN ORGANIZED HEALTH CARE EDUCATION/TRAINING PROGRAM
Payer: MEDICARE

## 2024-12-11 DIAGNOSIS — R00.1 BRADYCARDIA: Primary | ICD-10-CM

## 2024-12-11 DIAGNOSIS — I49.5 SINUS NODE DYSFUNCTION (HCC): ICD-10-CM

## 2024-12-11 LAB
ALBUMIN SERPL-MCNC: 3.5 G/DL (ref 3.4–5)
ALBUMIN/GLOB SERPL: 1.5 {RATIO} (ref 1.1–2.2)
ALP SERPL-CCNC: 69 U/L (ref 40–129)
ALT SERPL-CCNC: 14 U/L (ref 10–40)
ANION GAP SERPL CALCULATED.3IONS-SCNC: 12 MMOL/L (ref 3–16)
AST SERPL-CCNC: 18 U/L (ref 15–37)
BASOPHILS # BLD: 0.1 K/UL (ref 0–0.2)
BASOPHILS NFR BLD: 1.1 %
BILIRUB SERPL-MCNC: 0.3 MG/DL (ref 0–1)
BUN SERPL-MCNC: 22 MG/DL (ref 7–20)
CALCIUM SERPL-MCNC: 9.5 MG/DL (ref 8.3–10.6)
CHLORIDE SERPL-SCNC: 106 MMOL/L (ref 99–110)
CO2 SERPL-SCNC: 23 MMOL/L (ref 21–32)
CREAT SERPL-MCNC: 1.7 MG/DL (ref 0.8–1.3)
DEPRECATED RDW RBC AUTO: 13.3 % (ref 12.4–15.4)
EKG ATRIAL RATE: 58 BPM
EKG DIAGNOSIS: NORMAL
EKG P AXIS: 49 DEGREES
EKG P-R INTERVAL: 200 MS
EKG Q-T INTERVAL: 432 MS
EKG QRS DURATION: 106 MS
EKG QTC CALCULATION (BAZETT): 424 MS
EKG R AXIS: 30 DEGREES
EKG T AXIS: 37 DEGREES
EKG VENTRICULAR RATE: 58 BPM
EOSINOPHIL # BLD: 0.1 K/UL (ref 0–0.6)
EOSINOPHIL NFR BLD: 1.7 %
GFR SERPLBLD CREATININE-BSD FMLA CKD-EPI: 38 ML/MIN/{1.73_M2}
GLUCOSE SERPL-MCNC: 108 MG/DL (ref 70–99)
HCT VFR BLD AUTO: 33.3 % (ref 40.5–52.5)
HGB BLD-MCNC: 10.9 G/DL (ref 13.5–17.5)
LYMPHOCYTES # BLD: 0.8 K/UL (ref 1–5.1)
LYMPHOCYTES NFR BLD: 13.1 %
MCH RBC QN AUTO: 31.4 PG (ref 26–34)
MCHC RBC AUTO-ENTMCNC: 32.7 G/DL (ref 31–36)
MCV RBC AUTO: 96 FL (ref 80–100)
MONOCYTES # BLD: 0.7 K/UL (ref 0–1.3)
MONOCYTES NFR BLD: 10.8 %
NEUTROPHILS # BLD: 4.5 K/UL (ref 1.7–7.7)
NEUTROPHILS NFR BLD: 73.3 %
NT-PROBNP SERPL-MCNC: 980 PG/ML (ref 0–449)
PLATELET # BLD AUTO: 264 K/UL (ref 135–450)
PMV BLD AUTO: 9.3 FL (ref 5–10.5)
POTASSIUM SERPL-SCNC: 4.5 MMOL/L (ref 3.5–5.1)
PROT SERPL-MCNC: 5.8 G/DL (ref 6.4–8.2)
RBC # BLD AUTO: 3.47 M/UL (ref 4.2–5.9)
SODIUM SERPL-SCNC: 141 MMOL/L (ref 136–145)
TROPONIN, HIGH SENSITIVITY: 41 NG/L (ref 0–22)
TROPONIN, HIGH SENSITIVITY: 42 NG/L (ref 0–22)
WBC # BLD AUTO: 6.1 K/UL (ref 4–11)

## 2024-12-11 PROCEDURE — 6370000000 HC RX 637 (ALT 250 FOR IP): Performed by: STUDENT IN AN ORGANIZED HEALTH CARE EDUCATION/TRAINING PROGRAM

## 2024-12-11 PROCEDURE — 97110 THERAPEUTIC EXERCISES: CPT

## 2024-12-11 PROCEDURE — 71045 X-RAY EXAM CHEST 1 VIEW: CPT

## 2024-12-11 PROCEDURE — 83880 ASSAY OF NATRIURETIC PEPTIDE: CPT

## 2024-12-11 PROCEDURE — 36415 COLL VENOUS BLD VENIPUNCTURE: CPT

## 2024-12-11 PROCEDURE — 97530 THERAPEUTIC ACTIVITIES: CPT

## 2024-12-11 PROCEDURE — 97112 NEUROMUSCULAR REEDUCATION: CPT

## 2024-12-11 PROCEDURE — 6360000002 HC RX W HCPCS: Performed by: STUDENT IN AN ORGANIZED HEALTH CARE EDUCATION/TRAINING PROGRAM

## 2024-12-11 PROCEDURE — 97116 GAIT TRAINING THERAPY: CPT

## 2024-12-11 PROCEDURE — G0378 HOSPITAL OBSERVATION PER HR: HCPCS

## 2024-12-11 PROCEDURE — 96372 THER/PROPH/DIAG INJ SC/IM: CPT

## 2024-12-11 PROCEDURE — 93005 ELECTROCARDIOGRAM TRACING: CPT | Performed by: EMERGENCY MEDICINE

## 2024-12-11 PROCEDURE — 99285 EMERGENCY DEPT VISIT HI MDM: CPT

## 2024-12-11 PROCEDURE — 85025 COMPLETE CBC W/AUTO DIFF WBC: CPT

## 2024-12-11 PROCEDURE — 80053 COMPREHEN METABOLIC PANEL: CPT

## 2024-12-11 PROCEDURE — 84484 ASSAY OF TROPONIN QUANT: CPT

## 2024-12-11 PROCEDURE — 93010 ELECTROCARDIOGRAM REPORT: CPT | Performed by: INTERNAL MEDICINE

## 2024-12-11 PROCEDURE — 2580000003 HC RX 258: Performed by: STUDENT IN AN ORGANIZED HEALTH CARE EDUCATION/TRAINING PROGRAM

## 2024-12-11 RX ORDER — ASPIRIN 81 MG/1
81 TABLET, CHEWABLE ORAL DAILY
Status: DISCONTINUED | OUTPATIENT
Start: 2024-12-11 | End: 2024-12-11

## 2024-12-11 RX ORDER — CLOPIDOGREL BISULFATE 75 MG/1
75 TABLET ORAL DAILY
Status: DISCONTINUED | OUTPATIENT
Start: 2024-12-12 | End: 2024-12-13 | Stop reason: HOSPADM

## 2024-12-11 RX ORDER — ENOXAPARIN SODIUM 100 MG/ML
40 INJECTION SUBCUTANEOUS DAILY
Status: DISCONTINUED | OUTPATIENT
Start: 2024-12-11 | End: 2024-12-13 | Stop reason: HOSPADM

## 2024-12-11 RX ORDER — SODIUM CHLORIDE 0.9 % (FLUSH) 0.9 %
5-40 SYRINGE (ML) INJECTION EVERY 12 HOURS SCHEDULED
Status: DISCONTINUED | OUTPATIENT
Start: 2024-12-11 | End: 2024-12-13 | Stop reason: HOSPADM

## 2024-12-11 RX ORDER — SODIUM CHLORIDE 0.9 % (FLUSH) 0.9 %
5-40 SYRINGE (ML) INJECTION PRN
Status: DISCONTINUED | OUTPATIENT
Start: 2024-12-11 | End: 2024-12-13 | Stop reason: HOSPADM

## 2024-12-11 RX ORDER — AMLODIPINE BESYLATE 5 MG/1
10 TABLET ORAL DAILY
Status: DISCONTINUED | OUTPATIENT
Start: 2024-12-12 | End: 2024-12-13 | Stop reason: HOSPADM

## 2024-12-11 RX ORDER — ONDANSETRON 4 MG/1
4 TABLET, ORALLY DISINTEGRATING ORAL EVERY 8 HOURS PRN
Status: DISCONTINUED | OUTPATIENT
Start: 2024-12-11 | End: 2024-12-13 | Stop reason: HOSPADM

## 2024-12-11 RX ORDER — ACETAMINOPHEN 650 MG/1
650 SUPPOSITORY RECTAL EVERY 6 HOURS PRN
Status: DISCONTINUED | OUTPATIENT
Start: 2024-12-11 | End: 2024-12-13 | Stop reason: HOSPADM

## 2024-12-11 RX ORDER — HYDRALAZINE HYDROCHLORIDE 25 MG/1
25 TABLET, FILM COATED ORAL EVERY 8 HOURS SCHEDULED
Status: DISCONTINUED | OUTPATIENT
Start: 2024-12-11 | End: 2024-12-13 | Stop reason: HOSPADM

## 2024-12-11 RX ORDER — SODIUM CHLORIDE 9 MG/ML
INJECTION, SOLUTION INTRAVENOUS PRN
Status: DISCONTINUED | OUTPATIENT
Start: 2024-12-11 | End: 2024-12-13 | Stop reason: HOSPADM

## 2024-12-11 RX ORDER — VALSARTAN 80 MG/1
80 TABLET ORAL 2 TIMES DAILY
Status: DISCONTINUED | OUTPATIENT
Start: 2024-12-11 | End: 2024-12-13 | Stop reason: HOSPADM

## 2024-12-11 RX ORDER — ASPIRIN 325 MG
325 TABLET, DELAYED RELEASE (ENTERIC COATED) ORAL DAILY
Status: DISCONTINUED | OUTPATIENT
Start: 2024-12-12 | End: 2024-12-13 | Stop reason: HOSPADM

## 2024-12-11 RX ORDER — ACETAMINOPHEN 325 MG/1
650 TABLET ORAL EVERY 6 HOURS PRN
Status: DISCONTINUED | OUTPATIENT
Start: 2024-12-11 | End: 2024-12-13 | Stop reason: HOSPADM

## 2024-12-11 RX ORDER — ONDANSETRON 2 MG/ML
4 INJECTION INTRAMUSCULAR; INTRAVENOUS EVERY 6 HOURS PRN
Status: DISCONTINUED | OUTPATIENT
Start: 2024-12-11 | End: 2024-12-13 | Stop reason: HOSPADM

## 2024-12-11 RX ORDER — ATORVASTATIN CALCIUM 40 MG/1
40 TABLET, FILM COATED ORAL NIGHTLY
Status: DISCONTINUED | OUTPATIENT
Start: 2024-12-11 | End: 2024-12-13 | Stop reason: HOSPADM

## 2024-12-11 RX ORDER — POLYETHYLENE GLYCOL 3350 17 G/17G
17 POWDER, FOR SOLUTION ORAL DAILY PRN
Status: DISCONTINUED | OUTPATIENT
Start: 2024-12-11 | End: 2024-12-13 | Stop reason: HOSPADM

## 2024-12-11 RX ADMIN — ATORVASTATIN CALCIUM 40 MG: 40 TABLET, FILM COATED ORAL at 21:02

## 2024-12-11 RX ADMIN — VALSARTAN 80 MG: 80 TABLET, FILM COATED ORAL at 21:02

## 2024-12-11 RX ADMIN — HYDRALAZINE HYDROCHLORIDE 25 MG: 25 TABLET ORAL at 21:02

## 2024-12-11 RX ADMIN — SODIUM CHLORIDE, PRESERVATIVE FREE 5 ML: 5 INJECTION INTRAVENOUS at 21:03

## 2024-12-11 RX ADMIN — ENOXAPARIN SODIUM 40 MG: 100 INJECTION SUBCUTANEOUS at 21:02

## 2024-12-11 ASSESSMENT — LIFESTYLE VARIABLES
HOW OFTEN DO YOU HAVE A DRINK CONTAINING ALCOHOL: NEVER
HOW MANY STANDARD DRINKS CONTAINING ALCOHOL DO YOU HAVE ON A TYPICAL DAY: PATIENT DOES NOT DRINK

## 2024-12-11 ASSESSMENT — PAIN - FUNCTIONAL ASSESSMENT: PAIN_FUNCTIONAL_ASSESSMENT: NONE - DENIES PAIN

## 2024-12-11 NOTE — ED PROVIDER NOTES
g/dL    Albumin/Globulin Ratio 1.5 1.1 - 2.2    Total Bilirubin 0.3 0.0 - 1.0 mg/dL    Alkaline Phosphatase 69 40 - 129 U/L    ALT 14 10 - 40 U/L    AST 18 15 - 37 U/L   Troponin   Result Value Ref Range    Troponin, High Sensitivity 42 (H) 0 - 22 ng/L   BNP   Result Value Ref Range    NT Pro- (H) 0 - 449 pg/mL   Troponin   Result Value Ref Range    Troponin, High Sensitivity 41 (H) 0 - 22 ng/L   EKG 12 Lead   Result Value Ref Range    Ventricular Rate 58 BPM    Atrial Rate 58 BPM    P-R Interval 200 ms    QRS Duration 106 ms    Q-T Interval 432 ms    QTc Calculation (Bazett) 424 ms    P Axis 49 degrees    R Axis 30 degrees    T Axis 37 degrees    Diagnosis       Sinus bradycardia with marked sinus arrhythmia with occasional Premature ventricular complexesInferior infarct (cited on or before 24-MAY-2013)Abnormal ECGWhen compared with ECG of 01-NOV-2024 09:09,No significant change was foundConfirmed by AMANDA WHARTON ([id number]) on 12/11/2024 2:56:24 PM         Procedures  Procedures    ED Course       Upon reassessment, Akbar Arias is resting comfortably, denies lightheadedness    When patient went from lying to standing and his heart rate increased to 75.  Denies symptoms at this time.    Diagnostic studies reviewed.  Patient has an elevated BNP and troponin-which is flat.  Chest x-ray is clear.  No chest pain or dyspnea.  No clinical signs of ischemia or acute heart failure    I discussed case with cardiology on-call does recommend overnight observation on cardiac monitor, cardiology evaluation in a.m.    I discussed the results with patient/family including incidental findings.    Is this patient to be included in the SEP-1 core measure? No Exclusion criteria - the patient is NOT to be included for SEP-1 Core Measure due to: Infection is not suspected    Consults/discussion with other professionals: IP CONSULT TO CARDIOLOGY    Clinical Impression:  1. Bradycardia          Disposition / Plan:  Decision To

## 2024-12-11 NOTE — ED NOTES
Akbar Arias is a 89 y.o. male admitted for  Principal Problem:    Asymptomatic bradycardia  Resolved Problems:    * No resolved hospital problems. *  .   Patient Home via EMS transportation with   Chief Complaint   Patient presents with    Bradycardia     Patient was at cardiac rehab and after he stood up became dizzy, HR 36 and bp was 70/40 per staff. Pt placed in trendelenburg and became asymptomatic. HR now 48 with PACs and bp returned to 140/70, given 200 cc of NS bolus by EMS   .  Patient is alert and Person, Place, Time, and Situation  Patient's baseline mobility: Baseline Mobility: Independent   Code Status: Full Code   Cardiac Rhythm:       Is patient on baseline Oxygen: no how many Liters:   Abnormal Assessment Findings: Patient has no acute complaints    Isolation: None      NIH Score:    C-SSRS: Risk of Suicide: No Risk  Bedside swallow:        Active LDA's:   Peripheral IV 12/11/24 Right Antecubital (Active)   Site Assessment Clean, dry & intact 12/11/24 1444   Line Status Brisk blood return;Flushed;Normal saline locked;Specimen collected 12/11/24 1444   Phlebitis Assessment No symptoms 12/11/24 1444   Infiltration Assessment 0 12/11/24 1444   Dressing Status New dressing applied 12/11/24 1444   Dressing Type Transparent 12/11/24 1444   Dressing Intervention New 12/11/24 1444           Family/Caregiver Present yes Any Concerns: no   Restraints no  Sitter no         Vitals:      Vitals:    12/11/24 1642 12/11/24 1651 12/11/24 1706 12/11/24 1721   BP:  134/67 (!) 144/58 (!) 152/53   Pulse:  58 60 59   Resp: 15 19 19 20   Temp:       TempSrc:       SpO2: 97% 97% 97% 97%   Weight:       Height:           Last documented pain score (0-10 scale)    Pain medication administered No.    Pertinent or High Risk Medications/Drips: No.    Pending Blood Product Administration: no    Abnormal labs:   Abnormal Labs Reviewed   CBC WITH AUTO DIFFERENTIAL - Abnormal; Notable for the following components:       Result

## 2024-12-11 NOTE — FLOWSHEET NOTE
[facility]- Outpatient Rehabilitation and Therapy  [address] [place] [address] office: [phone] fax: [phone]     Physical Therapy: TREATMENT/PROGRESS NOTE   Patient: Akbar Arias (89 y.o. male)   Examination Date: 2024   :  1935 MRN: [id number]   Visit #: 4   Insurance Allowable Auth Needed   BMN  [x]Yes    []No    Insurance: Payor: [facility] MEDICARE / Plan: [facility] MEDICARE COMPLETE / Product Type: *No Product type* /   Insurance ID: [id number] - (Medicare Managed)  Secondary Insurance (if applicable):    Treatment Diagnosis:   1. Impaired gait and mobility  R26.89         2. Impaired tolerance of activity  R68.89         3. Bilateral leg weakness  R29.898        Medical Diagnosis:  Acute CVA (cerebrovascular accident) (HCC) [I63.9]   Referring Physician: Geri Fraire MD  PCP: Damian Smiley MD     Plan of care signed (Y/N): yes    Date of Patient follow up with Physician: cardiology not yet scheduled, pt has called but is waiting for call back.      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   Contraindications for Manipulation: blood thinners (relative)     Red Flags:  None    Suicide Screening:   The patient did not verbalize a primary behavioral concern, suicidal ideation, suicidal intent, or demonstrate suicidal behaviors.    Preferred Language for Healthcare:   [x] English       []

## 2024-12-11 NOTE — TELEPHONE ENCOUNTER
Pt spouse returned call, spouse not on HIPAA, pt gave verbal permission to speak w/ her. Relayed KXA message, spouse vu and will await a return call w/ next steps.

## 2024-12-11 NOTE — H&P
Hospital Medicine History & Physical      Date of Admission: 12/11/2024    Date of Service:  Pt seen/examined on 12/11/2024    []Admitted to Inpatient with expected LOS greater than two midnights due to medical therapy.  [x]Placed in Observation status.    Chief Admission Complaint: Sinus bradycardia bradycardia    Presenting Admission History:      89 y.o. male who presented to [facility] with bradycardia that was noted during cardiac rehab.  PMHx significant for essential hypertension, recent CVA on full dose aspirin and Plavix but supposed to be on for 90 days and then aspirin full dose monotherapy, bradycardia, CKD 3.  Patient was in cardiac rehab.  He stood up and felt slightly lightheaded and heart rate at the time was 38.  Patient was referred to ED for further evaluation.  In ED heart rate ranged from mid 40s to low 50s however heart rate with have good orthodromic response when he would get up heart rate will rise to 70s without any symptoms.  Cardiology was contacted in the ED and recommended observation overnight.  Patient is being admitted for observation.  Patient is hemodynamically stable and workup in ED was otherwise unremarkable.    Assessment/Plan:      Current Principal Problem:  Asymptomatic bradycardia    Sinus bradycardia, asymptomatic-on not sure if symptoms of lightheadedness and cardiac rehab was related to bradycardia however this possible.  Patient is not on any AV iona blockers.  Patient was supposed to be on heart monitor but the results of that are pending.Cardiology consulted and appreciated.  Recommended observation.  Would defer to cardiology whether echocardiogram is needed.  Will obtain TSH to rule out reversible causes.  Given lack of symptoms and positive orthodromic response will likely will not need pacemaker.  Monitor on telemetry and monitor hemodynamics closely.    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

## 2024-12-11 NOTE — ED NOTES
ED CARDIOLOGY CONSULT  RE-denisse  1718-Paged [facility] Cardio  1730- returned page, transferred to

## 2024-12-11 NOTE — FLOWSHEET NOTE
[facility]- Outpatient Rehabilitation and Therapy  [address] [place] [address] office: [phone] fax: [phone]       Daily Treatment Note/Progress Note   Patient: Akbar Arias (89 y.o. male)   Examination Date: 2024   :  1935 MRN: [id number]   Visit #: 2   Insurance Allowable:  BMN  Auth Needed: Yes   Insurance: Payor: [facility] MEDICARE / Plan: [facility] MEDICARE COMPLETE / Product Type: *No Product type* /   Insurance ID: [id number] - (Medicare Managed)  Secondary Insurance (if applicable):    Treatment Diagnosis:  M62.81 (ICD-10-CM) - Muscle weakness (generalized), R27.8 (ICD-10-CM) - Other lack of coordination, R53.1 (ICD-10-CM) - Weakness, Z74.1 (ICD-10-CM) - Need for assistance with personal care, and R41.9 (ICD-10-CM) - Unspecified symptoms and signs involving cognitive functions and awareness   Medical Diagnosis:  Acute CVA (cerebrovascular accident) (HCC) [I63.9]   Referring Provider: Geri Fraire MD  PCP: Damian Smiley MD       Plan of care signed: Geri Fields 24    Date of Patient follow up with Physician:  TBBEVERLY    POC update note: NO  POC update due: (OR 10 visits /OR AUTH LIMITS, whichever is less) -  1/3/25  Recertification due: 1/3/25                                                          Precautions/ Contra-indications:   Latex allergy:   No  Pacemaker:     No  Other: NA    Red Flags:  None    Suicide Screening:   The patient did not verbalize a primary behavioral concern, suicidal ideation, suicidal intent, or demonstrate suicidal behaviors.    Preferred Language for Healthcare: English    Review Of Systems (ROS):  [x] Performed Review of systems (Integumentary, CardioPulmonary, Neurological) by intake and observation. Intake form has been scanned into medical record. Patient has been instructed to contact their primary care physician regarding ROS issues if not already being addressed at this time.    [x] Patient history,

## 2024-12-12 ENCOUNTER — ANESTHESIA EVENT (OUTPATIENT)
Dept: CARDIAC CATH/INVASIVE PROCEDURES | Age: 89
End: 2024-12-12
Payer: MEDICARE

## 2024-12-12 ENCOUNTER — ANESTHESIA (OUTPATIENT)
Dept: CARDIAC CATH/INVASIVE PROCEDURES | Age: 89
End: 2024-12-12
Payer: MEDICARE

## 2024-12-12 ENCOUNTER — NURSE ONLY (OUTPATIENT)
Dept: CARDIOLOGY CLINIC | Age: 89
End: 2024-12-12

## 2024-12-12 ENCOUNTER — APPOINTMENT (OUTPATIENT)
Dept: GENERAL RADIOLOGY | Age: 89
End: 2024-12-12
Payer: MEDICARE

## 2024-12-12 DIAGNOSIS — Z95.0 PACEMAKER: Primary | ICD-10-CM

## 2024-12-12 PROBLEM — I10 PRIMARY HYPERTENSION: Status: ACTIVE | Noted: 2024-12-12

## 2024-12-12 PROBLEM — I49.3 FREQUENT PVCS: Status: ACTIVE | Noted: 2024-12-12

## 2024-12-12 PROBLEM — I49.5 SINUS NODE DYSFUNCTION (HCC): Status: ACTIVE | Noted: 2024-12-12

## 2024-12-12 LAB
ANION GAP SERPL CALCULATED.3IONS-SCNC: 7 MMOL/L (ref 3–16)
BASOPHILS # BLD: 0.1 K/UL (ref 0–0.2)
BASOPHILS NFR BLD: 1.5 %
BUN SERPL-MCNC: 24 MG/DL (ref 7–20)
CALCIUM SERPL-MCNC: 9.5 MG/DL (ref 8.3–10.6)
CHLORIDE SERPL-SCNC: 106 MMOL/L (ref 99–110)
CO2 SERPL-SCNC: 27 MMOL/L (ref 21–32)
CREAT SERPL-MCNC: 1.4 MG/DL (ref 0.8–1.3)
DEPRECATED RDW RBC AUTO: 13.1 % (ref 12.4–15.4)
ECHO BSA: 2.07 M2
EKG ATRIAL RATE: 62 BPM
EKG DIAGNOSIS: NORMAL
EKG P AXIS: 40 DEGREES
EKG P-R INTERVAL: 228 MS
EKG Q-T INTERVAL: 438 MS
EKG QRS DURATION: 116 MS
EKG QTC CALCULATION (BAZETT): 444 MS
EKG R AXIS: -2 DEGREES
EKG T AXIS: 20 DEGREES
EKG VENTRICULAR RATE: 62 BPM
EOSINOPHIL # BLD: 0.2 K/UL (ref 0–0.6)
EOSINOPHIL NFR BLD: 3.3 %
GFR SERPLBLD CREATININE-BSD FMLA CKD-EPI: 48 ML/MIN/{1.73_M2}
GLUCOSE SERPL-MCNC: 98 MG/DL (ref 70–99)
HCT VFR BLD AUTO: 32.8 % (ref 40.5–52.5)
HGB BLD-MCNC: 10.9 G/DL (ref 13.5–17.5)
LYMPHOCYTES # BLD: 1.3 K/UL (ref 1–5.1)
LYMPHOCYTES NFR BLD: 24 %
MCH RBC QN AUTO: 31.7 PG (ref 26–34)
MCHC RBC AUTO-ENTMCNC: 33.2 G/DL (ref 31–36)
MCV RBC AUTO: 95.5 FL (ref 80–100)
MONOCYTES # BLD: 0.7 K/UL (ref 0–1.3)
MONOCYTES NFR BLD: 12.5 %
NEUTROPHILS # BLD: 3.1 K/UL (ref 1.7–7.7)
NEUTROPHILS NFR BLD: 58.7 %
PLATELET # BLD AUTO: 245 K/UL (ref 135–450)
PMV BLD AUTO: 9.2 FL (ref 5–10.5)
POTASSIUM SERPL-SCNC: 4.9 MMOL/L (ref 3.5–5.1)
RBC # BLD AUTO: 3.43 M/UL (ref 4.2–5.9)
SODIUM SERPL-SCNC: 140 MMOL/L (ref 136–145)
TSH SERPL DL<=0.005 MIU/L-ACNC: 1.31 UIU/ML (ref 0.27–4.2)
WBC # BLD AUTO: 5.3 K/UL (ref 4–11)

## 2024-12-12 PROCEDURE — 6370000000 HC RX 637 (ALT 250 FOR IP): Performed by: STUDENT IN AN ORGANIZED HEALTH CARE EDUCATION/TRAINING PROGRAM

## 2024-12-12 PROCEDURE — 36415 COLL VENOUS BLD VENIPUNCTURE: CPT

## 2024-12-12 PROCEDURE — 2709999900 HC NON-CHARGEABLE SUPPLY: Performed by: INTERNAL MEDICINE

## 2024-12-12 PROCEDURE — 2580000003 HC RX 258: Performed by: STUDENT IN AN ORGANIZED HEALTH CARE EDUCATION/TRAINING PROGRAM

## 2024-12-12 PROCEDURE — 71045 X-RAY EXAM CHEST 1 VIEW: CPT

## 2024-12-12 PROCEDURE — C1892 INTRO/SHEATH,FIXED,PEEL-AWAY: HCPCS | Performed by: INTERNAL MEDICINE

## 2024-12-12 PROCEDURE — 2580000003 HC RX 258: Performed by: INTERNAL MEDICINE

## 2024-12-12 PROCEDURE — 3700000001 HC ADD 15 MINUTES (ANESTHESIA): Performed by: INTERNAL MEDICINE

## 2024-12-12 PROCEDURE — G0378 HOSPITAL OBSERVATION PER HR: HCPCS

## 2024-12-12 PROCEDURE — 7100000011 HC PHASE II RECOVERY - ADDTL 15 MIN: Performed by: INTERNAL MEDICINE

## 2024-12-12 PROCEDURE — 99223 1ST HOSP IP/OBS HIGH 75: CPT | Performed by: INTERNAL MEDICINE

## 2024-12-12 PROCEDURE — C1898 LEAD, PMKR, OTHER THAN TRANS: HCPCS | Performed by: INTERNAL MEDICINE

## 2024-12-12 PROCEDURE — 3700000000 HC ANESTHESIA ATTENDED CARE: Performed by: INTERNAL MEDICINE

## 2024-12-12 PROCEDURE — 33208 INSRT HEART PM ATRIAL & VENT: CPT | Performed by: INTERNAL MEDICINE

## 2024-12-12 PROCEDURE — 84443 ASSAY THYROID STIM HORMONE: CPT

## 2024-12-12 PROCEDURE — 80048 BASIC METABOLIC PNL TOTAL CA: CPT

## 2024-12-12 PROCEDURE — 7100000010 HC PHASE II RECOVERY - FIRST 15 MIN: Performed by: INTERNAL MEDICINE

## 2024-12-12 PROCEDURE — C1894 INTRO/SHEATH, NON-LASER: HCPCS | Performed by: INTERNAL MEDICINE

## 2024-12-12 PROCEDURE — C1785 PMKR, DUAL, RATE-RESP: HCPCS | Performed by: INTERNAL MEDICINE

## 2024-12-12 PROCEDURE — 2580000003 HC RX 258: Performed by: NURSE ANESTHETIST, CERTIFIED REGISTERED

## 2024-12-12 PROCEDURE — 6370000000 HC RX 637 (ALT 250 FOR IP): Performed by: INTERNAL MEDICINE

## 2024-12-12 PROCEDURE — 85025 COMPLETE CBC W/AUTO DIFF WBC: CPT

## 2024-12-12 PROCEDURE — 6360000002 HC RX W HCPCS: Performed by: NURSE ANESTHETIST, CERTIFIED REGISTERED

## 2024-12-12 PROCEDURE — 6360000002 HC RX W HCPCS: Performed by: INTERNAL MEDICINE

## 2024-12-12 PROCEDURE — 93005 ELECTROCARDIOGRAM TRACING: CPT | Performed by: INTERNAL MEDICINE

## 2024-12-12 PROCEDURE — 93010 ELECTROCARDIOGRAM REPORT: CPT | Performed by: INTERNAL MEDICINE

## 2024-12-12 DEVICE — IMPLANTABLE DEVICE
Type: IMPLANTABLE DEVICE | Site: CHEST | Status: FUNCTIONAL
Brand: AMVIA EDGE DR-T

## 2024-12-12 DEVICE — IMPLANTABLE DEVICE
Type: IMPLANTABLE DEVICE | Site: HEART | Status: FUNCTIONAL
Brand: SOLIA S 60

## 2024-12-12 DEVICE — IMPLANTABLE DEVICE
Type: IMPLANTABLE DEVICE | Site: HEART | Status: FUNCTIONAL
Brand: SOLIA

## 2024-12-12 RX ORDER — ONDANSETRON 2 MG/ML
4 INJECTION INTRAMUSCULAR; INTRAVENOUS ONCE
Status: DISCONTINUED | OUTPATIENT
Start: 2024-12-12 | End: 2024-12-13 | Stop reason: HOSPADM

## 2024-12-12 RX ORDER — DIPHENHYDRAMINE HYDROCHLORIDE 50 MG/ML
6.25 INJECTION INTRAMUSCULAR; INTRAVENOUS
Status: DISCONTINUED | OUTPATIENT
Start: 2024-12-12 | End: 2024-12-13 | Stop reason: HOSPADM

## 2024-12-12 RX ORDER — PHENYLEPHRINE HCL IN 0.9% NACL 1 MG/10 ML
SYRINGE (ML) INTRAVENOUS
Status: DISCONTINUED | OUTPATIENT
Start: 2024-12-12 | End: 2024-12-12 | Stop reason: SDUPTHER

## 2024-12-12 RX ORDER — PROPOFOL 10 MG/ML
INJECTION, EMULSION INTRAVENOUS
Status: DISCONTINUED | OUTPATIENT
Start: 2024-12-12 | End: 2024-12-12 | Stop reason: SDUPTHER

## 2024-12-12 RX ORDER — SODIUM CHLORIDE 0.9 % (FLUSH) 0.9 %
5-40 SYRINGE (ML) INJECTION PRN
Status: DISCONTINUED | OUTPATIENT
Start: 2024-12-12 | End: 2024-12-12 | Stop reason: HOSPADM

## 2024-12-12 RX ORDER — NALOXONE HYDROCHLORIDE 0.4 MG/ML
INJECTION, SOLUTION INTRAMUSCULAR; INTRAVENOUS; SUBCUTANEOUS PRN
Status: DISCONTINUED | OUTPATIENT
Start: 2024-12-12 | End: 2024-12-13 | Stop reason: HOSPADM

## 2024-12-12 RX ORDER — SODIUM CHLORIDE 0.9 % (FLUSH) 0.9 %
5-40 SYRINGE (ML) INJECTION EVERY 12 HOURS SCHEDULED
Status: DISCONTINUED | OUTPATIENT
Start: 2024-12-12 | End: 2024-12-13 | Stop reason: HOSPADM

## 2024-12-12 RX ORDER — SODIUM CHLORIDE 0.9 % (FLUSH) 0.9 %
5-40 SYRINGE (ML) INJECTION PRN
Status: DISCONTINUED | OUTPATIENT
Start: 2024-12-12 | End: 2024-12-13 | Stop reason: HOSPADM

## 2024-12-12 RX ORDER — SODIUM CHLORIDE 9 MG/ML
INJECTION, SOLUTION INTRAVENOUS PRN
Status: DISCONTINUED | OUTPATIENT
Start: 2024-12-12 | End: 2024-12-13 | Stop reason: HOSPADM

## 2024-12-12 RX ORDER — SODIUM CHLORIDE 0.9 % (FLUSH) 0.9 %
5-40 SYRINGE (ML) INJECTION EVERY 12 HOURS SCHEDULED
Status: DISCONTINUED | OUTPATIENT
Start: 2024-12-12 | End: 2024-12-12 | Stop reason: HOSPADM

## 2024-12-12 RX ORDER — SODIUM CHLORIDE 9 MG/ML
INJECTION, SOLUTION INTRAVENOUS PRN
Status: DISCONTINUED | OUTPATIENT
Start: 2024-12-12 | End: 2024-12-12 | Stop reason: HOSPADM

## 2024-12-12 RX ORDER — CEFAZOLIN SODIUM 1 G/3ML
INJECTION, POWDER, FOR SOLUTION INTRAMUSCULAR; INTRAVENOUS
Status: DISCONTINUED | OUTPATIENT
Start: 2024-12-12 | End: 2024-12-12 | Stop reason: SDUPTHER

## 2024-12-12 RX ORDER — BUPIVACAINE HYDROCHLORIDE 5 MG/ML
INJECTION, SOLUTION EPIDURAL; INTRACAUDAL PRN
Status: DISCONTINUED | OUTPATIENT
Start: 2024-12-12 | End: 2024-12-12 | Stop reason: HOSPADM

## 2024-12-12 RX ORDER — OXYCODONE HYDROCHLORIDE 5 MG/1
10 TABLET ORAL PRN
Status: DISCONTINUED | OUTPATIENT
Start: 2024-12-12 | End: 2024-12-13 | Stop reason: HOSPADM

## 2024-12-12 RX ORDER — CARVEDILOL 3.12 MG/1
3.12 TABLET ORAL 2 TIMES DAILY WITH MEALS
Status: DISCONTINUED | OUTPATIENT
Start: 2024-12-12 | End: 2024-12-13 | Stop reason: HOSPADM

## 2024-12-12 RX ORDER — FENTANYL CITRATE 50 UG/ML
INJECTION, SOLUTION INTRAMUSCULAR; INTRAVENOUS
Status: DISCONTINUED | OUTPATIENT
Start: 2024-12-12 | End: 2024-12-12 | Stop reason: SDUPTHER

## 2024-12-12 RX ORDER — MIDAZOLAM HYDROCHLORIDE 1 MG/ML
2 INJECTION, SOLUTION INTRAMUSCULAR; INTRAVENOUS
Status: DISCONTINUED | OUTPATIENT
Start: 2024-12-12 | End: 2024-12-13 | Stop reason: HOSPADM

## 2024-12-12 RX ORDER — OXYCODONE HYDROCHLORIDE 5 MG/1
5 TABLET ORAL PRN
Status: DISCONTINUED | OUTPATIENT
Start: 2024-12-12 | End: 2024-12-13 | Stop reason: HOSPADM

## 2024-12-12 RX ORDER — SODIUM CHLORIDE 9 MG/ML
INJECTION, SOLUTION INTRAVENOUS
Status: DISCONTINUED | OUTPATIENT
Start: 2024-12-12 | End: 2024-12-12 | Stop reason: SDUPTHER

## 2024-12-12 RX ORDER — GLYCOPYRROLATE 0.2 MG/ML
INJECTION INTRAMUSCULAR; INTRAVENOUS
Status: DISCONTINUED | OUTPATIENT
Start: 2024-12-12 | End: 2024-12-12 | Stop reason: SDUPTHER

## 2024-12-12 RX ADMIN — GLYCOPYRROLATE 0.2 MG: 0.2 INJECTION, SOLUTION INTRAMUSCULAR; INTRAVENOUS at 12:30

## 2024-12-12 RX ADMIN — VALSARTAN 80 MG: 80 TABLET, FILM COATED ORAL at 21:11

## 2024-12-12 RX ADMIN — HYDRALAZINE HYDROCHLORIDE 25 MG: 25 TABLET ORAL at 16:17

## 2024-12-12 RX ADMIN — ASPIRIN 325 MG: 325 TABLET, COATED ORAL at 08:32

## 2024-12-12 RX ADMIN — Medication 100 MCG: at 12:57

## 2024-12-12 RX ADMIN — Medication 100 MCG: at 12:39

## 2024-12-12 RX ADMIN — Medication 100 MCG: at 12:51

## 2024-12-12 RX ADMIN — SODIUM CHLORIDE, PRESERVATIVE FREE 5 ML: 5 INJECTION INTRAVENOUS at 11:20

## 2024-12-12 RX ADMIN — CARVEDILOL 3.12 MG: 3.12 TABLET, FILM COATED ORAL at 16:17

## 2024-12-12 RX ADMIN — SODIUM CHLORIDE: 9 INJECTION, SOLUTION INTRAVENOUS at 12:00

## 2024-12-12 RX ADMIN — ATORVASTATIN CALCIUM 40 MG: 40 TABLET, FILM COATED ORAL at 21:11

## 2024-12-12 RX ADMIN — CLOPIDOGREL BISULFATE 75 MG: 75 TABLET ORAL at 08:32

## 2024-12-12 RX ADMIN — POLYETHYLENE GLYCOL 3350 17 G: 17 POWDER, FOR SOLUTION ORAL at 06:45

## 2024-12-12 RX ADMIN — HYDRALAZINE HYDROCHLORIDE 25 MG: 25 TABLET ORAL at 21:11

## 2024-12-12 RX ADMIN — PROPOFOL 80 MCG/KG/MIN: 10 INJECTION, EMULSION INTRAVENOUS at 12:12

## 2024-12-12 RX ADMIN — Medication 100 MCG: at 12:42

## 2024-12-12 RX ADMIN — VALSARTAN 80 MG: 80 TABLET, FILM COATED ORAL at 08:32

## 2024-12-12 RX ADMIN — CEFAZOLIN 2 G: 1 INJECTION, POWDER, FOR SOLUTION INTRAMUSCULAR; INTRAVENOUS at 12:20

## 2024-12-12 RX ADMIN — Medication 100 MCG: at 12:48

## 2024-12-12 RX ADMIN — Medication 100 MCG: at 12:55

## 2024-12-12 RX ADMIN — HYDRALAZINE HYDROCHLORIDE 25 MG: 25 TABLET ORAL at 06:45

## 2024-12-12 RX ADMIN — Medication 10 ML: at 21:12

## 2024-12-12 RX ADMIN — AMLODIPINE BESYLATE 10 MG: 5 TABLET ORAL at 08:32

## 2024-12-12 RX ADMIN — Medication 100 MCG: at 12:45

## 2024-12-12 RX ADMIN — FENTANYL CITRATE 50 MCG: 50 INJECTION, SOLUTION INTRAMUSCULAR; INTRAVENOUS at 12:34

## 2024-12-12 RX ADMIN — FENTANYL CITRATE 50 MCG: 50 INJECTION, SOLUTION INTRAMUSCULAR; INTRAVENOUS at 12:55

## 2024-12-12 RX ADMIN — SODIUM CHLORIDE, PRESERVATIVE FREE 5 ML: 5 INJECTION INTRAVENOUS at 08:35

## 2024-12-12 ASSESSMENT — ENCOUNTER SYMPTOMS
SNORING: 1
STRIDOR: 0
WHEEZING: 0
LEFT EYE: 0
RIGHT EYE: 0
HEMATOCHEZIA: 0
HEMATEMESIS: 0
SHORTNESS OF BREATH: 0

## 2024-12-12 ASSESSMENT — PAIN SCALES - GENERAL: PAINLEVEL_OUTOF10: 0

## 2024-12-12 NOTE — CONSULTS
Cardiac Electrophysiology Consult Note      Physician requesting consult: Guevara Hess MD   Reason for Consult: bradycardia  Admission Date: 12/11/2024  Room/Bed:  [facility]4/0354-01    Assessment:     1. Bradycardia/dizziness: patient recently evaluated by me (while in inpatient rehab) for asymptomatic bradycardia (at that time). We noted frequent unifocal PVC's on ECG. Frequent PVC's can artificially cause sensed bradycardia (as the cardiac contractions from the PVC's may generate lower cardiac output than normal beats and hence not picked up by peripheral testing as actual heart beats). He was on telemetry and plans made, given lack of symptoms, to keep in inpatient rehab and plan an event monitor (done through  but results not available yet).     Since then, he was noted to have frequent daytime sinus bradycardia with intermittent lightheadedness but no marked dizziness or syncope.     Given poorly controlled blood pressure and frequent PVC's (which would benefit from beta-blocker therapy), and now development of symptoms with his bradycardia (along with prior episode of unexplained syncope), reasonable to proceed with dual chamber pacemaker implant. Of note, he is not on any negative chronotropic agents.     I had a discussion with the patient about pacemaker implantation. We discussed the risks, benefits and alternatives of this option. We discussed the possible associated risks including, but not limited to, the risks of infection, bleeding, vascular injury, injury to cardiac or surrounding structures including pneumothorax, lead or device malfunction or dislodgement, radiation exposure, injury to cardiac structures, stroke, and the small risk to life. The patient considered the proposed treatment options and decided to proceed with the device implantation. Will plan for later today. Keep NPO and avoid negative chronotropic agents.     2. Frequent PVC's\" He has frequent focal PVC's appearing to originate

## 2024-12-12 NOTE — CARE COORDINATION
Case Management Assessment  Initial Evaluation    Date/Time of Evaluation: 12/12/2024 9:17 AM  Assessment Completed by: Viridiana Thorne RN    If patient is discharged prior to next notation, then this note serves as note for discharge by case management.    Patient Name: Akbar Arias                   YOB: 1935  Diagnosis: Bradycardia [R00.1]  Asymptomatic bradycardia [R00.1]                   Date / Time: 12/11/2024  2:37 PM    Patient Admission Status: Observation   Readmission Risk (Low < 19, Mod (19-27), High > 27): Readmission Risk Score: 7.5    Current PCP: Damian Smiley MD  PCP verified by CM? Yes    Chart Reviewed: Yes      History Provided by: Patient  Patient Orientation: Alert and Oriented    Patient Cognition: Alert    Hospitalization in the last 30 days (Readmission):  No    If yes, Readmission Assessment in CM Navigator will be completed.    Advance Directives:      Code Status: Full Code   Patient's Primary Decision Maker is: Legal Next of Kin      Discharge Planning:    Patient lives with: Spouse/Significant Other Type of Home: House  Primary Care Giver: Self  Patient Support Systems include: Spouse/Significant Other   Current Financial resources: Medicare  Current community resources: None  Current services prior to admission: None            Current DME:              Type of Home Care services:  None    ADLS  Prior functional level: Independent in ADLs/IADLs  Current functional level: Independent in ADLs/IADLs    PT AM-PAC:   /24  OT AM-PAC:   /24    Family can provide assistance at DC: Yes  Would you like Case Management to discuss the discharge plan with any other family members/significant others, and if so, who? Yes  Plans to Return to Present Housing: Yes  Other Identified Issues/Barriers to RETURNING to current housing:   Potential Assistance needed at discharge: N/A            Potential DME:    Patient expects to discharge to: House  Plan for transportation at

## 2024-12-12 NOTE — ANESTHESIA PRE PROCEDURE
Department of Anesthesiology  Preprocedure Note       Name:  Akbar Arias   Age:  89 y.o.  :  1935                                          MRN:  [id number]         Date:  2024      Surgeon: Surgeon(s):  Desiree Mai MD    Procedure: Procedure(s):  Insert PPM dual    Medications prior to admission:   Prior to Admission medications    Medication Sig Start Date End Date Taking? Authorizing Provider   amLODIPine (NORVASC) 10 MG tablet Take 1 tablet by mouth daily  Patient taking differently: Take 0.5 tablets by mouth daily 24  Yes Geri Fraire MD   aspirin 325 MG EC tablet Take 1 tablet by mouth daily 24   Geri Fraire MD   atorvastatin (LIPITOR) 40 MG tablet Take 1 tablet by mouth nightly 24   Geri Fraire MD   clopidogrel (PLAVIX) 75 MG tablet Take 1 tablet by mouth daily 24   Geri Fraire MD   hydrALAZINE (APRESOLINE) 25 MG tablet Take 1 tablet by mouth every 8 hours 24   Geri Fraire MD   metFORMIN (GLUCOPHAGE-XR) 500 MG extended release tablet Take 1 tablet by mouth in the morning and at bedtime 24   Geri Fraire MD   valsartan (DIOVAN) 80 MG tablet Take 1 tablet by mouth 2 times daily 24   Geri Fraire MD   sennosides-docusate sodium (SENOKOT-S) 8.6-50 MG tablet Take 1 tablet by mouth in the morning and at bedtime 24   Geri Fraire MD       Current medications:    Current Facility-Administered Medications   Medication Dose Route Frequency Provider Last Rate Last Admin   • sodium chloride flush 0.9 % injection 5-40 mL  5-40 mL IntraVENous 2 times per day Desiree Mai MD   5 mL at 24 1120   • sodium chloride flush 0.9 % injection 5-40 mL  5-40 mL IntraVENous PRN Desiree Mai MD       • 0.9 % sodium chloride infusion   IntraVENous PRN Desiree Mai MD       • sodium chloride flush 0.9 % injection 5-40 mL  5-40 mL IntraVENous 2 times per day Elvis Pelaez DO   5 mL at

## 2024-12-12 NOTE — ANESTHESIA POSTPROCEDURE EVALUATION
4.9                 12/12/2024 05:35 AM        CL                       106                 12/12/2024 05:35 AM        CO2                      27                  12/12/2024 05:35 AM        BUN                      24 (H)              12/12/2024 05:35 AM        CREATININE               1.4 (H)             12/12/2024 05:35 AM        GLUCOSE                  98                  12/12/2024 05:35 AM   COAGS  No results found for: \"PROTIME\", \"INR\", \"APTT\"    Intake & Output:  In: 920 [P.O.:320; I.V.:600]  Out: 10     Nausea & Vomiting:  No    Level of Consciousness:  Awake    Pain Assessment:  Adequate analgesia    Anesthesia Complications:  No apparent anesthetic complications    SUMMARY      Vital signs stable  OK to discharge from Stage I post anesthesia care.  Care transferred from Anesthesiology department on discharge from perioperative area       There were no known notable events for this encounter.

## 2024-12-12 NOTE — PLAN OF CARE
Problem: Safety - Adult  Goal: Free from fall injury  12/12/2024 0920 by Sera Cespedes RN  Outcome: Progressing  12/11/2024 2213 by Gaby Hernandez RN  Outcome: Progressing     Problem: Chronic Conditions and Co-morbidities  Goal: Patient's chronic conditions and co-morbidity symptoms are monitored and maintained or improved  12/12/2024 0920 by Sera Cespedes RN  Outcome: Progressing  12/11/2024 2213 by Gaby Hernandez RN  Outcome: Progressing  Flowsheets (Taken 12/11/2024 1930)  Care Plan - Patient's Chronic Conditions and Co-Morbidity Symptoms are Monitored and Maintained or Improved: Monitor and assess patient's chronic conditions and comorbid symptoms for stability, deterioration, or improvement     Problem: Discharge Planning  Goal: Discharge to home or other facility with appropriate resources  12/12/2024 0920 by Sera Cespedes RN  Outcome: Progressing  12/11/2024 2213 by Gaby Hernandez RN  Outcome: Progressing  Flowsheets (Taken 12/11/2024 1930)  Discharge to home or other facility with appropriate resources: Identify barriers to discharge with patient and caregiver

## 2024-12-13 ENCOUNTER — PROCEDURE VISIT (OUTPATIENT)
Dept: CARDIOLOGY CLINIC | Age: 89
End: 2024-12-13

## 2024-12-13 ENCOUNTER — APPOINTMENT (OUTPATIENT)
Dept: GENERAL RADIOLOGY | Age: 89
End: 2024-12-13
Payer: MEDICARE

## 2024-12-13 VITALS
TEMPERATURE: 97.4 F | RESPIRATION RATE: 15 BRPM | HEIGHT: 72 IN | DIASTOLIC BLOOD PRESSURE: 67 MMHG | WEIGHT: 184.3 LBS | OXYGEN SATURATION: 97 % | SYSTOLIC BLOOD PRESSURE: 149 MMHG | BODY MASS INDEX: 24.96 KG/M2 | HEART RATE: 61 BPM

## 2024-12-13 DIAGNOSIS — R00.1 BRADYCARDIA: ICD-10-CM

## 2024-12-13 DIAGNOSIS — Z95.0 PACEMAKER: Primary | ICD-10-CM

## 2024-12-13 PROCEDURE — 6370000000 HC RX 637 (ALT 250 FOR IP): Performed by: INTERNAL MEDICINE

## 2024-12-13 PROCEDURE — 6360000002 HC RX W HCPCS: Performed by: STUDENT IN AN ORGANIZED HEALTH CARE EDUCATION/TRAINING PROGRAM

## 2024-12-13 PROCEDURE — G0378 HOSPITAL OBSERVATION PER HR: HCPCS

## 2024-12-13 PROCEDURE — 96372 THER/PROPH/DIAG INJ SC/IM: CPT

## 2024-12-13 PROCEDURE — 71046 X-RAY EXAM CHEST 2 VIEWS: CPT

## 2024-12-13 PROCEDURE — 99232 SBSQ HOSP IP/OBS MODERATE 35: CPT

## 2024-12-13 PROCEDURE — 6370000000 HC RX 637 (ALT 250 FOR IP): Performed by: STUDENT IN AN ORGANIZED HEALTH CARE EDUCATION/TRAINING PROGRAM

## 2024-12-13 RX ORDER — CARVEDILOL 3.12 MG/1
3.12 TABLET ORAL 2 TIMES DAILY WITH MEALS
Qty: 60 TABLET | Refills: 0 | Status: SHIPPED | OUTPATIENT
Start: 2024-12-13

## 2024-12-13 RX ADMIN — AMLODIPINE BESYLATE 10 MG: 5 TABLET ORAL at 10:42

## 2024-12-13 RX ADMIN — ENOXAPARIN SODIUM 40 MG: 100 INJECTION SUBCUTANEOUS at 10:42

## 2024-12-13 RX ADMIN — CLOPIDOGREL BISULFATE 75 MG: 75 TABLET ORAL at 10:42

## 2024-12-13 RX ADMIN — CARVEDILOL 3.12 MG: 3.12 TABLET, FILM COATED ORAL at 10:46

## 2024-12-13 RX ADMIN — HYDRALAZINE HYDROCHLORIDE 25 MG: 25 TABLET ORAL at 06:59

## 2024-12-13 RX ADMIN — ASPIRIN 325 MG: 325 TABLET, COATED ORAL at 10:42

## 2024-12-13 RX ADMIN — VALSARTAN 80 MG: 80 TABLET, FILM COATED ORAL at 10:42

## 2024-12-13 ASSESSMENT — PAIN SCALES - GENERAL
PAINLEVEL_OUTOF10: 0
PAINLEVEL_OUTOF10: 0

## 2024-12-13 NOTE — DISCHARGE SUMMARY
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 male who presented to [facility] with bradycardia that was noted during cardiac rehab.  PMHx significant for essential hypertension, recent CVA on full dose aspirin and Plavix but supposed to be on for 90 days and then aspirin full dose monotherapy, bradycardia, CKD 3.  Patient was in cardiac rehab.  He stood up and felt slightly lightheaded and heart rate at the time was 38.  Patient was referred to ED for further evaluation.  In ED heart rate ranged from mid 40s to low 50s however heart rate with have good orthodromic response when he would get up heart rate will rise to 70s without any symptoms.  Cardiology was contacted in the ED and recommended observation overnight.  Patient is being admitted for observation.  Patient is hemodynamically stable and workup in ED was otherwise unremarkable      Assessment/Plan:      Sinus bradycardia, asymptomatic-on not sure if symptoms of lightheadedness and cardiac rehab was related to bradycardia however this possible.  Patient is not on any AV iona blockers.  Patient was supposed to be on heart monitor but the results of that are pending.Cardiology consulted and appreciated.  Recommended observation.  Would defer to cardiology whether

## 2024-12-13 NOTE — TELEPHONE ENCOUNTER
I know patient is now admitted and underwent dual PPM, but I wanted to FYI that UC monitor results are now scanned in. Thank you.

## 2024-12-13 NOTE — PROGRESS NOTES
4 Eyes Skin Assessment and Patient belongings     The patient is being assess for  Admission    I agree that 2 Nurses have performed a thorough Head to Toe Skin Assessment on the patient. ALL assessment sites listed below have been assessed.       Areas assessed by both nurses:   [x]   Head, Face, and Ears   [x]   Shoulders, Back, and Chest  [x]   Arms, Elbows, and Hands   [x]   Coccyx, Sacrum, and IschIum  [x]   Legs, Feet, and Heels        Does the Patient have Skin Breakdown?  No- just blanchable redness on buttocks and healed abrasion on BLE        Trent Prevention initiated:  No   Wound Care Orders initiated:  No      [facility] nurse consulted for Pressure Injury (Stage 3,4, Unstageable, DTI, NWPT, and Complex wounds), New and Established Ostomies:  No      I agree that 2 Nurses have reviewed patient belongings with the patient/family and documented in the flowsheet upon admission or transfer to the unit.     Belongings  Dental Appliances: None  Vision - Corrective Lenses: None  Hearing Aid: None  Clothing: Jacket/Coat, Pants, Shirt, Socks, Undergarments, Footwear, At bedside  Jewelry: None  Electronic Devices: None  Weapons (Notify Protective Services/Security): None  Home Medications: None  Valuables Given To: Patient  Provide Name(s) of Who Valuable(s) Were Given To: Akbar Arias       Nurse 1 eSignature: Electronically signed by JESUS VILLANUEVA RN on 12/11/24 at 10:37 PM EST    **SHARE this note so that the co-signing nurse is able to place an eSignature**    Nurse 2 eSignature: Electronically signed by Jennie Wang RN on 12/12/24 at 12:02 AM EST   
No change to pace maker incision site. Old small bloody drainage noted.   
Patient admitted to room 354 from ED.  Patient oriented to room, call light, bed rails, phone, lights and bathroom.  Patient instructed about the schedule of the day including: vital sign frequency, lab draws, possible tests, frequency of MD and staff rounds, including RN/MD rounding together at bedside, daily weights, and I &O's.  Patient instructed about prescribed diet, how to use 8MENU, and television.   bed alarm in place, patient aware of placement and reason. Telemetry box  in place, patient aware of placement and reason.  Bed locked, in lowest position, side rails up 2/4, call light within reach.  Will continue to monitor.     
Patient admitted to room 423 from cath lab.  Patient oriented to room, call light, bed rails, phone, lights and bathroom.  Patient instructed about the schedule of the day including: vital sign frequency, lab draws, possible tests, frequency of MD and staff rounds, including RN/MD rounding together at bedside, daily weights, and I &O's.  Patient instructed about prescribed diet, how to use 8MENU, and television. Bed alarm in place, patient aware of placement and reason. Telemetry box in place, patient aware of placement and reason.  Bed locked, in lowest position, side rails up 2/4, call light within reach.   Patient bedrest until 1815. Pacer site with small bloody drainage. Sling in place.     
Patient discharge completed. Discharge information included information on diagnosis including signs and symptoms, complications and when to seek medical attention. Information on new medications also provided included use for the medication, side effects and when to call the doctor. Patient verbalized understanding of all discharge information. Patient escorted out by staff with all documented belongings. Home with family.   
Report given to patients nurse. Pt transferred to room. CMU called and monitor is on and verified.  
Sips of Water with Meds    DVT Prophylaxis: [x]PPx LMWH  []SQ Heparin  []IPC/SCDs  []Eliquis  []Xarelto  []Coumadin  [] Heparin Drip  []Other -      Code status: Full Code    PT/OT Eval Status:   [x]NOT yet ordered  []Ordered and Pending   []Seen with Recommendations for:   [x]Home independently  []Home w/ assist  []HHC  []SNF  []Acute Rehab    Multi-Disciplinary Rounds with Case Management completed on 12/12/2024 with the following recommendations:  Anticipated Discharge Location: [x]Home w/ []HHC vs []SNF  []Acute Rehab  []LTAC  []Hospice  []Other -    Anticipated Discharge Day/Date:  12/13 ?  Barriers to Discharge:  awaiting for pacemaker for denisse  --------------------------------------------------    MDM (any 2 required for High level billing)    A. Problems (any 1)  [x] Acute/Chronic Illness/injury posing ongoing threat to life and/or bodily function without ongoing treatment    [] Severe exacerbation of chronic illness    --------------------------------------------------  B. Risk of Treatment (any 1)    [] Drugs/treatments that require intensive monitoring for toxicity    [] IV ABX (Vancomycin, Aminoglycosides, etc)     [] Post-Cath/Contrast study requiring serial monitoring    [] IV Narcotic analgesia    [] Aggressive IV diuresis    [] Hypertonic Saline    [] Critical electrolyte abnormalities requiring IV replacement    [] Insulin - Scheduled/SSI or Insulin gtt    [] Anticoagulation (Heparin gtt or Coumadin - other anticoagulants in special circumstances)    [] Cardiac Medications (IV Amiodarone/Diltiazem, Tikosyn, etc)    [] Hemodialysis    [] Other -    [] Change in code status    [] Decision to escalate care    [] Major surgery/procedure with associated risk factors    --------------------------------------------------  C. Data (any 2)    [] Data Review (any 3)    [x] Consultant notes from yesterday/today    [x] All available current labs reviewed interpreted for clinical significance    [x] Appropriate 
tablet 75 mg  75 mg Oral Daily Elvis Pelaez, DO   75 mg at 12/13/24 1042    hydrALAZINE (APRESOLINE) tablet 25 mg  25 mg Oral 3 times per day Elvis Pelaez, DO   25 mg at 12/13/24 0659    valsartan (DIOVAN) tablet 80 mg  80 mg Oral BID Elvis Pelaez, DO   80 mg at 12/13/24 1042    aspirin EC tablet 325 mg  325 mg Oral Daily Elvis Pelaez, DO   325 mg at 12/13/24 1042       Objective:     Telemetry monitor: Telemetry independently reviewed and interpreted by me today and shows: normal sinus rhythm     Physical Exam:  Constitutional and general appearance: alert, cooperative, no distress, and appears stated age  HEENT: PERRL, no cervical lymphadenopathy. No masses palpable. Normal oral mucosa  Respiratory:  Normal excursion and expansion without use of accessory muscles  Resp auscultation: Normal breath sounds without wheezing, rhonchi, and rales  Cardiovascular:  The apical impulse is not displaced  Heart tones are crisp and normal. regular S1 and S2.  Jugular venous pulsation Normal  The carotid upstroke is normal in amplitude and contour without delay or bruit  Peripheral pulses are symmetrical and full   Abdomen:  No masses or tenderness  Bowel sounds present  Extremities:   No cyanosis or clubbing   No lower extremity edema   Skin: warm and dry  Neurological:  Alert and oriented  Moves all extremities well  No abnormalities of mood, affect, memory, mentation, or behavior are noted    Data  EKG 12/11/2024  Sinus bradycardia with PVCs rate 58    Echo 10/21/2024  Study Conclusions     - Left ventricle: The cavity size is normal. Wall thickness was increased in     a pattern of moderate LVH. Systolic function is normal. The estimated     ejection fraction is 50-55%. Wall motion is normal; there are no regional     wall motion abnormalities. Grade I diastolic dysfunction.   - Right ventricle: Systolic function is moderately reduced by objective     interpretation.   - Atrial septum: Agitated saline contrast

## 2024-12-17 ENCOUNTER — HOSPITAL ENCOUNTER (OUTPATIENT)
Dept: SPEECH THERAPY | Age: 89
Setting detail: THERAPIES SERIES
Discharge: HOME | End: 2024-12-17
Attending: STUDENT IN AN ORGANIZED HEALTH CARE EDUCATION/TRAINING PROGRAM
Payer: MEDICARE

## 2024-12-17 ENCOUNTER — HOSPITAL ENCOUNTER (OUTPATIENT)
Dept: PHYSICAL THERAPY | Age: 89
Setting detail: THERAPIES SERIES
Discharge: HOME | End: 2024-12-17
Attending: STUDENT IN AN ORGANIZED HEALTH CARE EDUCATION/TRAINING PROGRAM
Payer: MEDICARE

## 2024-12-17 ENCOUNTER — HOSPITAL ENCOUNTER (OUTPATIENT)
Dept: OCCUPATIONAL THERAPY | Age: 89
Setting detail: THERAPIES SERIES
Discharge: HOME | End: 2024-12-17
Attending: STUDENT IN AN ORGANIZED HEALTH CARE EDUCATION/TRAINING PROGRAM
Payer: MEDICARE

## 2024-12-17 PROCEDURE — 97110 THERAPEUTIC EXERCISES: CPT

## 2024-12-17 PROCEDURE — 97530 THERAPEUTIC ACTIVITIES: CPT

## 2024-12-17 PROCEDURE — 97130 THER IVNTJ EA ADDL 15 MIN: CPT

## 2024-12-17 PROCEDURE — 97129 THER IVNTJ 1ST 15 MIN: CPT

## 2024-12-17 PROCEDURE — 97112 NEUROMUSCULAR REEDUCATION: CPT

## 2024-12-17 NOTE — FLOWSHEET NOTE
[facility]- Outpatient Rehabilitation and Therapy  [address] [place] [address] office: [phone] fax: [phone]    Speech Therapy: Daily Note   Patient: Akbar Arias (89 y.o. male)   Examination Date: 2024   :  1935 MRN: [id number]   Visit #: 6  Insurance Allowable Auth Needed    [x]Yes    []No    Insurance: Payor: [facility] MEDICARE / Plan: [facility] MEDICARE COMPLETE / Product Type: *No Product type* /   Insurance ID: [id number] - (Medicare Managed) 12 visits approved 24 to 24  Secondary Insurance (if applicable):    Treatment Diagnosis:  Cognitive Deficits [I69.319]    Medical Diagnosis:  Acute CVA (cerebrovascular accident) (HCC) [I63.9]   Referring Physician: Geri Fraire MD  PCP: Damian Smiley MD     Plan of care signed: YES    Functional Outcome Measure: Neuro-QOL Item Bank v2.0 - Cognition Function- Short Form (Sergo 2014):       Progress Report/POC: NO  POC update due: (10 visits /OR 30 days /OR duration of POC, whichever is less): 6/10 or 24    Precautions/ Contra-indications:   Latex allergy: No  Pacemaker: No  Other:  Significant cognitive deficits       Preferred Language for Healthcare:   [x]English       []other:    SUBJECTIVE EXAMINATION     Patient Report/Comments:   The pt was accompanied by his grandson. The pt said that he is feeling better since his recent hospitalization and pacemaker placement. Both denied any changes and his grandson said that he believes that the pt is doing better since returning home.     Location: Pt seen in therapy office  Demeanor: Pleasant  and Cooperative  Motivated: []  Yes  []  No   Caregivers Present: [x]  Yes  []  No       OBJECTIVE EXAMINATION   Exercises/Interventions:   Cognitive Function (01526 & 35405) Accuracy Cues Notes   Orientation        Attention           Sustained         Selective         Alternating  Min-mod       Divided      Memory          Immediate/Working         Short

## 2024-12-17 NOTE — FLOWSHEET NOTE
order to increase independence with functional mobility.  [] Progressing: [] Met: [] Not Met: [] Adjusted  2. Patient will improve 5x sit to 15 seconds to demonstrate improvement in LE muscle endurance.  [] Progressing: [] Met: [] Not Met: [] Adjusted  3. Pt to perform transfers via stand-pivot technique with use of no AD Independent  [] Progressing: [] Met: [] Not Met: [] Adjusted  4. Pt will improve TUG to 8 seconds to demonstrate improved gait speed and reduce risk for falls.  [] Progressing: [] Met: [] Not Met: [] Adjusted    Long Term Goals: To be achieved in: 8 weeks  1. Disability index score of 50% or less for the LEFS to assist with reaching prior level of function with activities such as walking 2 blocks.  [] Progressing: [] Met: [] Not Met: [] Adjusted  2. Patient will walk 5 minutes without SOB with supervision (patient specific functional goal)   [] Progressing: [] Met: [] Not Met: [] Adjusted  3. Pt will improve BETANCOURT to 45/56 to demonstrate increased safety at home and in the community and reduce risk for falls.  [] Progressing: [] Met: [] Not Met: [] Adjusted  4. Patient will improve 6 Min Walk Test to 1000' with no AD to demonstrate improved endurance for community ambulation.  [] Progressing: [] Met: [] Not Met: [] Adjusted    Overall Progression Towards Functional goals/ Treatment Progress Update:  [] Patient is progressing as expected towards functional goals listed.    [] Progression is slowed due to complexities/Impairments listed.  [] Progression has been slowed due to co-morbidities.  [x] Plan just implemented, too soon (<30days) to assess goals progression   [] Goals require adjustment due to lack of progress  [] Patient is not progressing as expected and requires additional follow up with physician  [] Other:     TREATMENT PLAN     Frequency/Duration: 2x/week for 8-10 weeks for the following treatment interventions:    Interventions:  Therapeutic Exercise (50097) including: strength

## 2024-12-17 NOTE — FLOWSHEET NOTE
[facility]- Outpatient Rehabilitation and Therapy  [address] [place] [address] office: [phone] fax: [phone]       Daily Treatment Note/Progress Note   Patient: Akbar Arias (89 y.o. male)   Examination Date: 2024   :  1935 MRN: [id number]   Visit #: 3   Insurance Allowable:  BMN  Auth Needed: Yes, Approval dates: 24 - 25  Approved visits: 16 OT  Approved codes: not code specific  Codes that DO NOT require auth: NA    Insurance: Payor: [facility] MEDICARE / Plan: [facility] MEDICARE COMPLETE / Product Type: *No Product type* /   Insurance ID: [id number] - (Medicare Managed)  Secondary Insurance (if applicable):    Treatment Diagnosis:  M62.81 (ICD-10-CM) - Muscle weakness (generalized), R27.8 (ICD-10-CM) - Other lack of coordination, R53.1 (ICD-10-CM) - Weakness, Z74.1 (ICD-10-CM) - Need for assistance with personal care, and R41.9 (ICD-10-CM) - Unspecified symptoms and signs involving cognitive functions and awareness   Medical Diagnosis:  Acute CVA (cerebrovascular accident) (HCC) [I63.9]   Referring Provider: Geri Fraire MD  PCP: Damian Smiley MD       Plan of care signed: Geri Fields 24    Date of Patient follow up with Physician:  TBD    POC update note: NO  POC update due: (OR 10 visits /OR AUTH LIMITS, whichever is less) -  1/3/25  Recertification due: 1/3/25                                                          Precautions/ Contra-indications:   Latex allergy:   No  Pacemaker:     Yes  Other:  no lifting LUE overhead until 24    Red Flags:  None    Suicide Screening:   The patient did not verbalize a primary behavioral concern, suicidal ideation, suicidal intent, or demonstrate suicidal behaviors.    Preferred Language for Healthcare: English    Review Of Systems (ROS):  [x] Performed Review of systems (Integumentary, CardioPulmonary, Neurological) by intake and observation. Intake form has been scanned into medical

## 2024-12-19 ENCOUNTER — HOSPITAL ENCOUNTER (OUTPATIENT)
Dept: PHYSICAL THERAPY | Age: 89
Setting detail: THERAPIES SERIES
Discharge: HOME | End: 2024-12-19
Attending: STUDENT IN AN ORGANIZED HEALTH CARE EDUCATION/TRAINING PROGRAM
Payer: MEDICARE

## 2024-12-19 ENCOUNTER — HOSPITAL ENCOUNTER (OUTPATIENT)
Dept: SPEECH THERAPY | Age: 89
Setting detail: THERAPIES SERIES
Discharge: HOME | End: 2024-12-19
Attending: STUDENT IN AN ORGANIZED HEALTH CARE EDUCATION/TRAINING PROGRAM
Payer: MEDICARE

## 2024-12-19 ENCOUNTER — TELEPHONE (OUTPATIENT)
Dept: CARDIOLOGY CLINIC | Age: 89
End: 2024-12-19

## 2024-12-19 ENCOUNTER — HOSPITAL ENCOUNTER (OUTPATIENT)
Dept: OCCUPATIONAL THERAPY | Age: 89
Setting detail: THERAPIES SERIES
Discharge: HOME | End: 2024-12-19
Attending: STUDENT IN AN ORGANIZED HEALTH CARE EDUCATION/TRAINING PROGRAM
Payer: MEDICARE

## 2024-12-19 ENCOUNTER — NURSE ONLY (OUTPATIENT)
Dept: CARDIOLOGY CLINIC | Age: 89
End: 2024-12-19

## 2024-12-19 DIAGNOSIS — Z95.0 PACEMAKER: Primary | ICD-10-CM

## 2024-12-19 DIAGNOSIS — R00.1 BRADYCARDIA: ICD-10-CM

## 2024-12-19 PROCEDURE — 97530 THERAPEUTIC ACTIVITIES: CPT

## 2024-12-19 PROCEDURE — 97129 THER IVNTJ 1ST 15 MIN: CPT

## 2024-12-19 PROCEDURE — 97130 THER IVNTJ EA ADDL 15 MIN: CPT

## 2024-12-19 PROCEDURE — 97112 NEUROMUSCULAR REEDUCATION: CPT

## 2024-12-19 PROCEDURE — 97110 THERAPEUTIC EXERCISES: CPT

## 2024-12-19 NOTE — FLOWSHEET NOTE
Exercise Program: Not enough time for HEP instruction this visit.  Plan to address at follow up.        ASSESSMENT     Today's Assessment: pt continues to be limited by fatigue and prolonged rest breaks provided between all standing therex. Pt expresses high fear of falling and hating all balance exercises and has significant buckle x 1 and episode of dizziness x 1 with positive orthostatic hypotension.     Medical Necessity Documentation:  I certify that this patient meets the below criteria necessary for medical necessity for care and/or justification of therapy services:  The patient has functional impairments and/or activity limitations and would benefit from continued outpatient therapy services to address the deficits outlined in the patients goals    Prognosis/Rehab Potential: Good    Patient requires continued skilled intervention: [x] Yes  [] No      CHARGE CAPTURE     PT CHARGE GRID   CPT Code (TIMED) minutes # CPT Code (UNTIMED) #     Therex (59840)  25 2  EVAL:MODERATE (55909 - Typically 30 minutes face-to-face)     Neuromusc. Re-ed (86576) 15 1  Re-Eval (03308)     Manual (35982)    Estim Unattended (14610)     Ther. Act (55769)    Mech. Traction (90389)     Gait (76045)    Dry Needle 1-2 muscle (01407)     Aquatic Therex (43212)    Dry Needle 3+ muscle (20561)     Iontophoresis (47435)    VASO (91499)     Ultrasound (99085)    Group Therapy (46816)     Estim Attended (56925)    Canalith Repositioning (49400)     Physical Performance Test (91080)    Custom orthotic ()     Other:    Other:    Total Timed Code Tx Minutes 40 3       Total Treatment Minutes 40         Charge Justification:  (70382) NEUROMUSCULAR RE-EDUCATION - Therapeutic procedure, 1 or more areas, each 15 minutes; neuromuscular reeducation of movement, balance, coordination, kinesthetic sense, posture, and/or proprioception for sitting and/or standing activities  (02457) GAIT RE-EDUCATION - Provided training and instruction to the

## 2024-12-19 NOTE — TELEPHONE ENCOUNTER
Spoke with pt's wife and son Peter. Informed them that they would need to reach out to PCP regarding BP medication. Per family PCP manages BP. Peter informed me that pt was dizzy after completing therapy.     Abhay when pt report comes in, please pull for provider to review. Dizzy episode happen between 1230p-230p

## 2024-12-19 NOTE — PLAN OF CARE
[facility]- Outpatient Rehabilitation and Therapy  [address] [place] [address] office: [phone] fax: [phone]       Speech Therapy Re-Certification Plan of Care      Dear Dr. Geri Fraire MD,     We had the pleasure of treating the following patient for speech therapy services at [facility].  A summary of our findings can be found in the updated assessment below.  This includes our plan of care.  If you have any questions or concerns regarding these findings, please do not hesitate to contact me at the office phone number checked above.  Thank you for the referral.     Physician Signature:________________________________Date:__________________  By signing above (or electronic signature), therapist’s plan is approved by physician      Functional Outcome:  Neuro-QOL Cognitive Function Short Form (Sergo 2014)  [place]: 5= never; 4= rarely (once); 3= sometimes (2-3 times); 2= often (once a day); 1= very often (several times a day)    In the past 7 days…  - I had to read something several times to understand it:  1  - My thinking was slow:  1  - I had to work really hard to pay attention or I would make a mistake:  1  - I had trouble concentratin  How much DIFFICULTY do you currently have…?  - reading and following complex instructions (e.g., directions for a new medication)?: 2  - planning for and keeping appointments that are not part of your weekly routine, (e.g., a therapy or doctor appointment, or a social gathering with friends and family)?:  1  - managing your time to do most of your daily activities? :  2  - learning new tasks or instructions?:  2    Raw Score:   T-score: 24.4  Interpretation: High scores indicate better self-reported health; mean T-score= 50 (standard deviation=10)    Reference: [facility], Rehabilitation Measures Database

## 2024-12-19 NOTE — PATIENT INSTRUCTIONS
transmissions unless specifically requested by the office staff - the steps to send a manual transmission are the same as when you paired your in-home monitor to your device for the first time.     Your device and monitor are wireless and most transmit cellularly, but please periodically check your monitor is still plugged in to the electrical outlet. If you still use the telephone land line to send, please ensure the connection to the phone mina is secure. This will help to ensure successful automatic transmissions in the future.     Please be aware that the remote device transmission sites are periodically monitored only during regular business hours during which simultaneous in-office device clinics are being conducted. If your transmission requires attention, we will contact you as soon as possible.    Your in-home monitor will do a full report on your device every 3 months (recurring appointments that run parallel to in office checks). You do not need to initiate a transmission or be awake at the appointment time listed - this is solely for office purposes.     Our office utilizes the \"no news is good news\" narrative regarding remote monitoring. A Device Specialist or RN will contact you with any critical findings from your remote monitoring.    Going forward, if you experience issues with your in-home monitor, please verify that it is plugged in to the wall. If issues persist, please contact the Customer Service numbers provided below, as they can troubleshoot issues that may be happening with the monitor itself. The Device [facility] works closely with the remote monitoring websites - if we notice there is a disconnection we will contact you to inform you and ask you to contact the  of your device.    [employer]   [phone]

## 2024-12-19 NOTE — TELEPHONE ENCOUNTER
Pt's wife sts pt was dizzy at therapy today and the therapist said they thought his    amLODIPine (NORVASC) dose is to high. Pt was always on 5 mg and they are not sure who changed it to 10 mg. Pt had pacer placed 12/12/24. Wife wants to confirm what dose pt should be on. Please advise.

## 2024-12-23 ENCOUNTER — HOSPITAL ENCOUNTER (OUTPATIENT)
Dept: OCCUPATIONAL THERAPY | Age: 89
Setting detail: THERAPIES SERIES
Discharge: HOME | End: 2024-12-23
Attending: STUDENT IN AN ORGANIZED HEALTH CARE EDUCATION/TRAINING PROGRAM
Payer: MEDICARE

## 2024-12-23 ENCOUNTER — HOSPITAL ENCOUNTER (OUTPATIENT)
Dept: PHYSICAL THERAPY | Age: 89
Setting detail: THERAPIES SERIES
Discharge: HOME | End: 2024-12-23
Attending: STUDENT IN AN ORGANIZED HEALTH CARE EDUCATION/TRAINING PROGRAM
Payer: MEDICARE

## 2024-12-23 PROCEDURE — 97110 THERAPEUTIC EXERCISES: CPT

## 2024-12-23 PROCEDURE — 97530 THERAPEUTIC ACTIVITIES: CPT

## 2024-12-23 PROCEDURE — 97112 NEUROMUSCULAR REEDUCATION: CPT

## 2024-12-23 NOTE — FLOWSHEET NOTE
[facility]- Outpatient Rehabilitation and Therapy  [address] [place] [address] office: [phone] fax: [phone]       Daily Treatment Note/Progress Note   Patient: Akbar Arias (89 y.o. male)   Examination Date: 2024   :  1935 MRN: [id number]   Visit #: 5   Insurance Allowable:  BMN  Auth Needed: Yes, Approval dates: 24 - 25  Approved visits: 16 OT  Approved codes: not code specific  Codes that DO NOT require auth: NA    Insurance: Payor: [facility] MEDICARE / Plan: [facility] MEDICARE COMPLETE / Product Type: *No Product type* /   Insurance ID: [id number] - (Medicare Managed)  Secondary Insurance (if applicable):    Treatment Diagnosis:  M62.81 (ICD-10-CM) - Muscle weakness (generalized), R27.8 (ICD-10-CM) - Other lack of coordination, R53.1 (ICD-10-CM) - Weakness, Z74.1 (ICD-10-CM) - Need for assistance with personal care, and R41.9 (ICD-10-CM) - Unspecified symptoms and signs involving cognitive functions and awareness   Medical Diagnosis:  Acute CVA (cerebrovascular accident) (HCC) [I63.9]   Referring Provider: Geri Fraire MD  PCP: Damian Smiley MD       Plan of care signed: Geri Fields 24    Date of Patient follow up with Physician:  TBD    POC update note: NO  POC update due: (OR 10 visits /OR AUTH LIMITS, whichever is less) -  1/3/25  Recertification due: 1/3/25                                                          Precautions/ Contra-indications:   Latex allergy:   No  Pacemaker:     Yes  Other:  no lifting LUE overhead until 24    Red Flags:  None    Suicide Screening:   The patient did not verbalize a primary behavioral concern, suicidal ideation, suicidal intent, or demonstrate suicidal behaviors.    Preferred Language for Healthcare: English    Review Of Systems (ROS):  [x] Performed Review of systems (Integumentary, CardioPulmonary, Neurological) by intake and observation. Intake form has been scanned into medical

## 2024-12-23 NOTE — FLOWSHEET NOTE
[facility]- Outpatient Rehabilitation and Therapy  [address] [place] [address] office: [phone] fax: [phone]     Physical Therapy: TREATMENT/PROGRESS NOTE   Patient: Akbar Arias (89 y.o. male)   Examination Date: 2024   :  1935 MRN: [id number]   Visit #: 7   Insurance Allowable Auth Needed   BMN  [x]Yes    []No    Insurance: Payor: [facility] MEDICARE / Plan: [facility] MEDICARE COMPLETE / Product Type: *No Product type* /   Insurance ID: [id number] - (Medicare Managed)  Secondary Insurance (if applicable):    Treatment Diagnosis:   1. Impaired gait and mobility  R26.89         2. Impaired tolerance of activity  R68.89         3. 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    Preferred Language for Healthcare:   [x] English       [] other:    SUBJECTIVE EXAMINATION   Patient stated complaint/comment: pt reports feeling \"Like I've lost my memory and my strength. If you want to get me to my before you have a long way to go.\"     From Eval:   Pt presents with grandson.

## 2025-01-06 ENCOUNTER — APPOINTMENT (OUTPATIENT)
Dept: SPEECH THERAPY | Age: 89
End: 2025-01-06
Attending: STUDENT IN AN ORGANIZED HEALTH CARE EDUCATION/TRAINING PROGRAM
Payer: MEDICARE

## 2025-01-06 ENCOUNTER — APPOINTMENT (OUTPATIENT)
Dept: OCCUPATIONAL THERAPY | Age: 89
End: 2025-01-06
Attending: STUDENT IN AN ORGANIZED HEALTH CARE EDUCATION/TRAINING PROGRAM
Payer: MEDICARE

## 2025-01-06 ENCOUNTER — HOSPITAL ENCOUNTER (OUTPATIENT)
Dept: SPEECH THERAPY | Age: 89
Setting detail: THERAPIES SERIES
End: 2025-01-06
Attending: STUDENT IN AN ORGANIZED HEALTH CARE EDUCATION/TRAINING PROGRAM
Payer: MEDICARE

## 2025-01-09 ENCOUNTER — HOSPITAL ENCOUNTER (OUTPATIENT)
Dept: OCCUPATIONAL THERAPY | Age: 89
Setting detail: THERAPIES SERIES
Discharge: HOME | End: 2025-01-09
Attending: STUDENT IN AN ORGANIZED HEALTH CARE EDUCATION/TRAINING PROGRAM
Payer: MEDICARE

## 2025-01-09 ENCOUNTER — HOSPITAL ENCOUNTER (OUTPATIENT)
Dept: SPEECH THERAPY | Age: 89
Setting detail: THERAPIES SERIES
Discharge: HOME | End: 2025-01-09
Attending: STUDENT IN AN ORGANIZED HEALTH CARE EDUCATION/TRAINING PROGRAM
Payer: MEDICARE

## 2025-01-09 PROCEDURE — 97130 THER IVNTJ EA ADDL 15 MIN: CPT

## 2025-01-09 PROCEDURE — 97530 THERAPEUTIC ACTIVITIES: CPT

## 2025-01-09 PROCEDURE — 97129 THER IVNTJ 1ST 15 MIN: CPT

## 2025-01-09 PROCEDURE — 97110 THERAPEUTIC EXERCISES: CPT

## 2025-01-09 NOTE — FLOWSHEET NOTE
[facility]- Outpatient Rehabilitation and Therapy  [address] [place] [address] office: [phone] fax: [phone]       Daily Treatment Note/Progress Note   Patient: Akbar Arias (89 y.o. male)   Examination Date: 2025   :  1935 MRN: [id number]   Visit #: 6   Insurance Allowable:  BMN  Auth Needed: Yes, Approval dates: 24 - 25  Approved visits: 16 OT  Approved codes: not code specific  Codes that DO NOT require auth: NA    Insurance: Payor: [facility] MEDICARE / Plan: [facility] MEDICARE COMPLETE / Product Type: *No Product type* /   Insurance ID: [id number] - (Medicare Managed)  Secondary Insurance (if applicable):    Treatment Diagnosis:  M62.81 (ICD-10-CM) - Muscle weakness (generalized), R27.8 (ICD-10-CM) - Other lack of coordination, R53.1 (ICD-10-CM) - Weakness, Z74.1 (ICD-10-CM) - Need for assistance with personal care, and R41.9 (ICD-10-CM) - Unspecified symptoms and signs involving cognitive functions and awareness   Medical Diagnosis:  Acute CVA (cerebrovascular accident) (HCC) [I63.9]   Referring Provider: Geri Fraire MD  PCP: Damian Smiley MD       Plan of care signed: Geri Fields 24    Date of Patient follow up with Physician:  TBD    POC update note: YES, Date Range for this report: 24 to 25  POC update due: (OR 10 visits /OR AUTH LIMITS, whichever is less) -  25  Recertification due: 25                                                          Precautions/ Contra-indications:   Latex allergy:   No  Pacemaker:     Yes  Other:  no lifting LUE overhead until 24    Red Flags:  None    Suicide Screening:   The patient did not verbalize a primary behavioral concern, suicidal ideation, suicidal intent, or demonstrate suicidal behaviors.    Preferred Language for Healthcare: English    Review Of Systems (ROS):  [x] Performed Review of systems (Integumentary, CardioPulmonary, Neurological) by intake and

## 2025-01-09 NOTE — FLOWSHEET NOTE
[facility]- Outpatient Rehabilitation and Therapy  [address] [place] [address] office: [phone] fax: [phone]         Speech Therapy: Daily Note   Patient: Akbar Arias (89 y.o. male)   Examination Date: 2025   :  1935 MRN: [id number]   Visit #: 8  Insurance Allowable Auth Needed    [x]Yes    []No    Insurance: Payor: [facility] MEDICARE / Plan: [facility] MEDICARE COMPLETE / Product Type: *No Product type* /   Insurance ID: [id number] - (Medicare Managed) 12 visits approved 25 to 25  Secondary Insurance (if applicable):    Treatment Diagnosis:  Cognitive Deficits [I69.319]    Medical Diagnosis:  Acute CVA (cerebrovascular accident) (HCC) [I63.9]   Referring Physician: Geri Fraire MD  PCP: Damian Smiley MD     Plan of care signed: YES    Functional Outcome Measure: Neuro-QOL Item Bank v2.0 - Cognition Function- Short Form (Sergo ):       Progress Report/POC: Yes  POC update due: (10 visits /OR 30 days /OR duration of POC, whichever is less):     Precautions/ Contra-indications:   Latex allergy: No  Pacemaker: No  Other:  Significant cognitive deficits       Preferred Language for Healthcare:   [x]English       []other:    SUBJECTIVE EXAMINATION     Patient Report/Comments:   The pt was seen for the first time since 24. The pt and his grandson reported that the pt has had some falls reported due to felling dizzy. Both denied any injuries from these falls. The pt was seen for OT prior to Speech Therapy. When walking to the Speech Therapy office he reported feeling dizzy. His BP was 129/50, HR was 59 and O2 Sats were 98%. This feeling of dizziness quickly subsided and he was able to to fully engage in the session without any other instances of dizziness.     Location: Pt seen in therapy office  Demeanor: Pleasant  and Cooperative  Motivated: []  Yes  []  No   Caregivers Present: [x]  Yes  []  No       OBJECTIVE EXAMINATION

## 2025-01-09 NOTE — PLAN OF CARE
.  Meal prep Would get beverage occasionally   Cleaning Used to help with dishes and trash   Bill pay Total A    Medication management Min A   Yard work Used to help with holding the bag   Community mobility Would go out to eat with spouse          Education: patient and caregiver  Role of OT in OP setting -  verb understanding  POC and goals - verb understanding  Purpose of assessment and various areas to be assessed - verb understanding    Modality Settings:    Home Exercise/Activities Program: HEP updated today patient in agreement with plan to work on \"slowing down\"    1/9/25 - working on laptop at home using mouse with RUE    Access Code: [email or web address]  URL: [email or web address]  Date: 12/17/2024  Prepared by: Jodi Matthews    Program Notes  buttoning a shirt, folding towels, folding socks    Exercises  - Gripping Sponge Pronated  - 2-3 x daily - 7 x weekly - 3 sets - 10 reps  - Tip Pinch with Putty  - 2-3 x daily - 7 x weekly - 3 sets - 10 reps    Access Code: [email or web address]  URL: [email or web address]  Date: 12/11/2024  Prepared by: Jodi Matthews    Program Notes  squeeze heart with each finger and thumb and hold for 10 s (2-3x/day)    Exercises  - Supine Shoulder Press  - 2-3 x daily - 7 x weekly - 1 sets - 10 reps  - Supine Shoulder Flexion with Free Weight  - 2-3 x daily - 7 x weekly - 1 sets - 10 reps  - Sidelying Shoulder Abduction with 3# weight  - 2-3 x daily - 7 x weekly - 1 sets - 10 reps  - Standing Shoulder Horizontal Abduction with Resistance  - 2-3 x daily - 7 x weekly - 1 sets - 10 reps    ASSESSMENT   Assessment: Patient has participated in 6 outpatient OT visits over the past 5 weeks.  He is progressing in gross motor coordination via box and blocks but did have a setback after a recent pacemaker placement.  He requires additional rehab in order to progress independence and function.  It is recommended that he continue OT POC 2x/week for 4 weeks.    Pt is a/an 89 y.o. male presenting today

## 2025-01-13 ENCOUNTER — HOSPITAL ENCOUNTER (OUTPATIENT)
Dept: SPEECH THERAPY | Age: 89
Setting detail: THERAPIES SERIES
Discharge: HOME | End: 2025-01-13
Attending: STUDENT IN AN ORGANIZED HEALTH CARE EDUCATION/TRAINING PROGRAM
Payer: MEDICARE

## 2025-01-13 ENCOUNTER — HOSPITAL ENCOUNTER (OUTPATIENT)
Dept: OCCUPATIONAL THERAPY | Age: 89
Setting detail: THERAPIES SERIES
End: 2025-01-13
Attending: STUDENT IN AN ORGANIZED HEALTH CARE EDUCATION/TRAINING PROGRAM
Payer: MEDICARE

## 2025-01-13 NOTE — FLOWSHEET NOTE
01/13/2025     Electronically Signed by:   Hugh Chambers MA, CCC-SLP  SP#[id number]  Speech-Language Pathologist  Phone #: [phone]  Fax #: [phone]      Note: If patient does not return for scheduled/ recommended follow up visits, this note will serve as a discharge from care along with most recent update on progress.

## 2025-01-15 ENCOUNTER — HOSPITAL ENCOUNTER (OUTPATIENT)
Dept: SPEECH THERAPY | Age: 89
Setting detail: THERAPIES SERIES
Discharge: HOME | End: 2025-01-15
Attending: STUDENT IN AN ORGANIZED HEALTH CARE EDUCATION/TRAINING PROGRAM
Payer: MEDICARE

## 2025-01-15 ENCOUNTER — HOSPITAL ENCOUNTER (OUTPATIENT)
Dept: PHYSICAL THERAPY | Age: 89
Setting detail: THERAPIES SERIES
Discharge: HOME | End: 2025-01-15
Attending: STUDENT IN AN ORGANIZED HEALTH CARE EDUCATION/TRAINING PROGRAM
Payer: MEDICARE

## 2025-01-15 PROCEDURE — 97130 THER IVNTJ EA ADDL 15 MIN: CPT

## 2025-01-15 PROCEDURE — 97112 NEUROMUSCULAR REEDUCATION: CPT

## 2025-01-15 PROCEDURE — 97116 GAIT TRAINING THERAPY: CPT

## 2025-01-15 PROCEDURE — 97129 THER IVNTJ 1ST 15 MIN: CPT

## 2025-01-15 PROCEDURE — 97110 THERAPEUTIC EXERCISES: CPT

## 2025-01-15 NOTE — PLAN OF CARE
[facility]- Outpatient Rehabilitation and Therapy  [address] [place] [address] office: [phone] fax: [phone]    Physical Therapy Re-Certification Plan of Care    Dear Geri Fraire MD  ,    We had the pleasure of treating the following patient for physical therapy services at [facility]. A summary of our findings can be found in the updated assessment below.  This includes our plan of care.  If you have any questions or concerns regarding these findings, please do not hesitate to contact me at the office phone number checked above.  Thank you for the referral.     Physician Signature:________________________________Date:__________________  By signing above (or electronic signature), therapist's plan is approved by physician    Total Visits: 8     Overall Response to Treatment:  Patient is responding well to treatment and improvement is noted with regards to goals    Recommendation:    [x] Continue PT 1-2x / wk for 4-8 weeks.   [] Hold PT, pending MD visit   [] Discharge to [facility]. Follow up with PT or MD PRN.        Physical Therapy: TPROGRESS NOTE   Patient: Akbar Arias (89 y.o. male)   Examination Date: 01/15/2025   :  1935 MRN: [id number]   Visit #: 8   Insurance Allowable Auth Needed   20 visits (12-2 to 2-10-25) [x]Yes    []No    Insurance: Payor: [facility] MEDICARE / Plan: [facility] MEDICARE COMPLETE / Product Type: *No Product type* /   Insurance ID: [id number] - (Medicare Managed)  Secondary Insurance (if applicable):    Treatment Diagnosis:   1. Impaired gait and mobility  R26.89         2. Impaired tolerance of activity  R68.89         3. Bilateral leg weakness  R29.898        Medical Diagnosis:  Acute CVA (cerebrovascular accident) (HCC) [I63.9]   Referring Physician: Geri Fraire MD  PCP: Damian Smiley MD     Plan of care signed (Y/N): yes    Date of Patient follow up with Physician:      Plan of Care Report: POC update

## 2025-01-15 NOTE — PLAN OF CARE
necessary for medical necessity for care and/or justification of therapy services:  The patient has functional impairments and/or activity limitations and would benefit from continued outpatient therapy services to address the deficits outlined in the patients goals    GOALS   Short Term Goals:   1. NEW GOAL: The pt will recall 4 pieces of new information after a 5 minute delay, max cues to encode and no cues to recall  GOAL MET  [] Progressing: [x] Met: [] Not Met: [] Adjusted     2. The pt will complete graded attention based tasks with 90% accuracy min-mod cues  [x] Progressing: [] Met: [] Not Met: [] Adjusted     3. The pt will complete functional math based tasks with 90% accuracy, mod cues  [x] Progressing: [] Met: [] Not Met: [] Adjusted     4. The pt will recall 3 pieces of new information after a 5 minute delay, max cues to encode and no cues to recall  GOAL MET  [] Progressing: [x] Met: [] Not Met: [] Adjusted      NEW SHORT-TERM GOAL:  The pt will recall 5 pieces of new information after a 5 minute delay, max cues to encode and no cues to recall    Long Term Goals:   1.Patient will demonstrate improved cognitive-linguistic function to highest functional level as demonstrated by improved score on Neuro-QOL Cognitive Function Short Form (Sergo 2014)  [x] Progressing: [] Met: [] Not Met: [] Adjusted    Overall Progression Towards Functional goals/ Treatment Progress Update:  [x] Patient is progressing as expected towards functional goals listed.    [] Progression is slowed due to complexities/Impairments listed.  [] Progression has been slowed due to co-morbidities.  [] Plan just implemented, too soon (<30days) to assess goals progression   [] Goals require adjustment due to lack of progress  [] Patient is not progressing as expected and requires additional follow up with physician  [] Other:       CHARGE CAPTURE     Speech/Voice Therapy:    []  (96510) Treatment of speech, language, voice, communication,

## 2025-01-20 ENCOUNTER — HOSPITAL ENCOUNTER (OUTPATIENT)
Dept: SPEECH THERAPY | Age: 89
Setting detail: THERAPIES SERIES
Discharge: HOME | End: 2025-01-20
Attending: STUDENT IN AN ORGANIZED HEALTH CARE EDUCATION/TRAINING PROGRAM
Payer: MEDICARE

## 2025-01-20 ENCOUNTER — HOSPITAL ENCOUNTER (OUTPATIENT)
Dept: OCCUPATIONAL THERAPY | Age: 89
Setting detail: THERAPIES SERIES
Discharge: HOME | End: 2025-01-20
Attending: STUDENT IN AN ORGANIZED HEALTH CARE EDUCATION/TRAINING PROGRAM
Payer: MEDICARE

## 2025-01-20 PROCEDURE — 97129 THER IVNTJ 1ST 15 MIN: CPT

## 2025-01-20 PROCEDURE — 97530 THERAPEUTIC ACTIVITIES: CPT

## 2025-01-20 PROCEDURE — 97130 THER IVNTJ EA ADDL 15 MIN: CPT

## 2025-01-20 NOTE — FLOWSHEET NOTE
Time out Total Time / units   Cognitive Tx  1102 1156 54 min/ 4 units    Speech/Voice Tx      Dysphagia Tx            TREATMENT PLAN      Continue with current POC      Electronically Signed by YUNG CAPPS              Date: 01/20/2025     Electronically Signed by:   Hugh Chambers MA, CCC-SLP  SP#[id number]  Speech-Language Pathologist  Phone #: [phone]  Fax #: [phone]      Note: If patient does not return for scheduled/ recommended follow up visits, this note will serve as a discharge from care along with most recent update on progress.

## 2025-01-20 NOTE — FLOWSHEET NOTE
[facility]- Outpatient Rehabilitation and Therapy  [address] [place] [address] office: [phone] fax: [phone]       Daily Treatment Note/Progress Note   Patient: Akbar Arias (89 y.o. male)   Examination Date: 2025   :  1935 MRN: [id number]   Visit #: 7   Insurance Allowable:  BMN  Auth Needed: Yes, Approval dates: 24 - 25  Approved visits: 16 OT  Approved codes: not code specific  Codes that DO NOT require auth: NA    Insurance: Payor: [facility] MEDICARE / Plan: [facility] MEDICARE COMPLETE / Product Type: *No Product type* /   Insurance ID: [id number] - (Medicare Managed)  Secondary Insurance (if applicable):    Treatment Diagnosis:  M62.81 (ICD-10-CM) - Muscle weakness (generalized), R27.8 (ICD-10-CM) - Other lack of coordination, R53.1 (ICD-10-CM) - Weakness, Z74.1 (ICD-10-CM) - Need for assistance with personal care, and R41.9 (ICD-10-CM) - Unspecified symptoms and signs involving cognitive functions and awareness   Medical Diagnosis:  Acute CVA (cerebrovascular accident) (HCC) [I63.9]   Referring Provider: Geri Fraire MD  PCP: Damian Smiley MD       Plan of care signed: Geri Fields 24, 25    Date of Patient follow up with Physician:  TBD    POC update note: NO  POC update due: (OR 10 visits /OR AUTH LIMITS, whichever is less) -  25  Recertification due: 25                                                          Precautions/ Contra-indications:   Latex allergy:   No  Pacemaker:     Yes  Other:  no lifting LUE overhead until 24    Red Flags:  None    Suicide Screening:   The patient did not verbalize a primary behavioral concern, suicidal ideation, suicidal intent, or demonstrate suicidal behaviors.    Preferred Language for Healthcare: English    Review Of Systems (ROS):  [x] Performed Review of systems (Integumentary, CardioPulmonary, Neurological) by intake and observation. Intake form has been scanned into

## 2025-01-22 ENCOUNTER — APPOINTMENT (OUTPATIENT)
Dept: CT IMAGING | Age: 89
End: 2025-01-22
Payer: MEDICARE

## 2025-01-22 ENCOUNTER — HOSPITAL ENCOUNTER (EMERGENCY)
Age: 89
Discharge: HOME | End: 2025-01-22
Attending: EMERGENCY MEDICINE
Payer: MEDICARE

## 2025-01-22 ENCOUNTER — APPOINTMENT (OUTPATIENT)
Dept: GENERAL RADIOLOGY | Age: 89
End: 2025-01-22
Payer: MEDICARE

## 2025-01-22 ENCOUNTER — HOSPITAL ENCOUNTER (OUTPATIENT)
Dept: PHYSICAL THERAPY | Age: 89
Setting detail: THERAPIES SERIES
Discharge: HOME | End: 2025-01-22
Attending: STUDENT IN AN ORGANIZED HEALTH CARE EDUCATION/TRAINING PROGRAM
Payer: MEDICARE

## 2025-01-22 VITALS
TEMPERATURE: 97.6 F | HEART RATE: 69 BPM | DIASTOLIC BLOOD PRESSURE: 58 MMHG | OXYGEN SATURATION: 97 % | SYSTOLIC BLOOD PRESSURE: 128 MMHG | RESPIRATION RATE: 21 BRPM

## 2025-01-22 DIAGNOSIS — N30.00 ACUTE CYSTITIS WITHOUT HEMATURIA: ICD-10-CM

## 2025-01-22 DIAGNOSIS — I95.1 ORTHOSTATIC HYPOTENSION: Primary | ICD-10-CM

## 2025-01-22 DIAGNOSIS — R91.8 MULTIPLE LUNG NODULES: ICD-10-CM

## 2025-01-22 LAB
ALBUMIN SERPL-MCNC: 3.9 G/DL (ref 3.4–5)
ALBUMIN/GLOB SERPL: 1.6 {RATIO} (ref 1.1–2.2)
ALP SERPL-CCNC: 79 U/L (ref 40–129)
ALT SERPL-CCNC: 12 U/L (ref 10–40)
ANION GAP SERPL CALCULATED.3IONS-SCNC: 9 MMOL/L (ref 3–16)
AST SERPL-CCNC: 16 U/L (ref 15–37)
BACTERIA URNS QL MICRO: ABNORMAL /HPF
BASOPHILS # BLD: 0.1 K/UL (ref 0–0.2)
BASOPHILS NFR BLD: 1.2 %
BILIRUB SERPL-MCNC: 0.3 MG/DL (ref 0–1)
BILIRUB UR QL STRIP.AUTO: NEGATIVE
BUN SERPL-MCNC: 21 MG/DL (ref 7–20)
CALCIUM SERPL-MCNC: 9.8 MG/DL (ref 8.3–10.6)
CHLORIDE SERPL-SCNC: 105 MMOL/L (ref 99–110)
CLARITY UR: CLEAR
CO2 SERPL-SCNC: 26 MMOL/L (ref 21–32)
COLOR UR: YELLOW
CREAT SERPL-MCNC: 1.7 MG/DL (ref 0.8–1.3)
DEPRECATED RDW RBC AUTO: 13.7 % (ref 12.4–15.4)
EKG ATRIAL RATE: 72 BPM
EKG DIAGNOSIS: NORMAL
EKG P AXIS: 91 DEGREES
EKG P-R INTERVAL: 216 MS
EKG Q-T INTERVAL: 416 MS
EKG QRS DURATION: 100 MS
EKG QTC CALCULATION (BAZETT): 455 MS
EKG R AXIS: 28 DEGREES
EKG T AXIS: 38 DEGREES
EKG VENTRICULAR RATE: 72 BPM
EOSINOPHIL # BLD: 0.1 K/UL (ref 0–0.6)
EOSINOPHIL NFR BLD: 1.6 %
EPI CELLS #/AREA URNS HPF: ABNORMAL /HPF (ref 0–5)
GFR SERPLBLD CREATININE-BSD FMLA CKD-EPI: 38 ML/MIN/{1.73_M2}
GLUCOSE SERPL-MCNC: 102 MG/DL (ref 70–99)
GLUCOSE UR STRIP.AUTO-MCNC: NEGATIVE MG/DL
HCT VFR BLD AUTO: 32.7 % (ref 40.5–52.5)
HGB BLD-MCNC: 11 G/DL (ref 13.5–17.5)
HGB UR QL STRIP.AUTO: NEGATIVE
KETONES UR STRIP.AUTO-MCNC: ABNORMAL MG/DL
LEUKOCYTE ESTERASE UR QL STRIP.AUTO: ABNORMAL
LYMPHOCYTES # BLD: 0.8 K/UL (ref 1–5.1)
LYMPHOCYTES NFR BLD: 12.1 %
MCH RBC QN AUTO: 32.7 PG (ref 26–34)
MCHC RBC AUTO-ENTMCNC: 33.6 G/DL (ref 31–36)
MCV RBC AUTO: 97.5 FL (ref 80–100)
MONOCYTES # BLD: 0.6 K/UL (ref 0–1.3)
MONOCYTES NFR BLD: 9.1 %
MUCOUS THREADS #/AREA URNS LPF: ABNORMAL /LPF
NEUTROPHILS # BLD: 5.2 K/UL (ref 1.7–7.7)
NEUTROPHILS NFR BLD: 76 %
NITRITE UR QL STRIP.AUTO: NEGATIVE
PH UR STRIP.AUTO: 5.5 [PH] (ref 5–8)
PLATELET # BLD AUTO: 325 K/UL (ref 135–450)
PMV BLD AUTO: 8.7 FL (ref 5–10.5)
POTASSIUM SERPL-SCNC: 4.3 MMOL/L (ref 3.5–5.1)
PROT SERPL-MCNC: 6.4 G/DL (ref 6.4–8.2)
PROT UR STRIP.AUTO-MCNC: 30 MG/DL
RBC # BLD AUTO: 3.36 M/UL (ref 4.2–5.9)
RBC #/AREA URNS HPF: ABNORMAL /HPF (ref 0–4)
SODIUM SERPL-SCNC: 140 MMOL/L (ref 136–145)
SP GR UR STRIP.AUTO: 1.02 (ref 1–1.03)
TROPONIN, HIGH SENSITIVITY: 31 NG/L (ref 0–22)
TROPONIN, HIGH SENSITIVITY: 36 NG/L (ref 0–22)
UA COMPLETE W REFLEX CULTURE PNL UR: ABNORMAL
UA DIPSTICK W REFLEX MICRO PNL UR: YES
URN SPEC COLLECT METH UR: ABNORMAL
UROBILINOGEN UR STRIP-ACNC: 0.2 E.U./DL
WBC # BLD AUTO: 6.9 K/UL (ref 4–11)
WBC #/AREA URNS HPF: ABNORMAL /HPF (ref 0–5)

## 2025-01-22 PROCEDURE — 84484 ASSAY OF TROPONIN QUANT: CPT

## 2025-01-22 PROCEDURE — 97112 NEUROMUSCULAR REEDUCATION: CPT

## 2025-01-22 PROCEDURE — 80053 COMPREHEN METABOLIC PANEL: CPT

## 2025-01-22 PROCEDURE — 85025 COMPLETE CBC W/AUTO DIFF WBC: CPT

## 2025-01-22 PROCEDURE — 36415 COLL VENOUS BLD VENIPUNCTURE: CPT

## 2025-01-22 PROCEDURE — 99285 EMERGENCY DEPT VISIT HI MDM: CPT

## 2025-01-22 PROCEDURE — 6370000000 HC RX 637 (ALT 250 FOR IP)

## 2025-01-22 PROCEDURE — 97110 THERAPEUTIC EXERCISES: CPT

## 2025-01-22 PROCEDURE — 93010 ELECTROCARDIOGRAM REPORT: CPT | Performed by: INTERNAL MEDICINE

## 2025-01-22 PROCEDURE — 93005 ELECTROCARDIOGRAM TRACING: CPT | Performed by: EMERGENCY MEDICINE

## 2025-01-22 PROCEDURE — 81001 URINALYSIS AUTO W/SCOPE: CPT

## 2025-01-22 PROCEDURE — 71045 X-RAY EXAM CHEST 1 VIEW: CPT

## 2025-01-22 PROCEDURE — 71250 CT THORAX DX C-: CPT

## 2025-01-22 PROCEDURE — 2580000003 HC RX 258: Performed by: EMERGENCY MEDICINE

## 2025-01-22 RX ORDER — CEFDINIR 300 MG/1
300 CAPSULE ORAL 2 TIMES DAILY
Qty: 13 CAPSULE | Refills: 0 | Status: SHIPPED | OUTPATIENT
Start: 2025-01-22 | End: 2025-01-29

## 2025-01-22 RX ORDER — CEFDINIR 300 MG/1
300 CAPSULE ORAL ONCE
Status: COMPLETED | OUTPATIENT
Start: 2025-01-22 | End: 2025-01-22

## 2025-01-22 RX ORDER — 0.9 % SODIUM CHLORIDE 0.9 %
500 INTRAVENOUS SOLUTION INTRAVENOUS ONCE
Status: COMPLETED | OUTPATIENT
Start: 2025-01-22 | End: 2025-01-22

## 2025-01-22 RX ADMIN — CEFDINIR 300 MG: 300 CAPSULE ORAL at 17:58

## 2025-01-22 RX ADMIN — SODIUM CHLORIDE 500 ML: 9 INJECTION, SOLUTION INTRAVENOUS at 15:33

## 2025-01-22 ASSESSMENT — LIFESTYLE VARIABLES
HOW MANY STANDARD DRINKS CONTAINING ALCOHOL DO YOU HAVE ON A TYPICAL DAY: PATIENT DOES NOT DRINK
HOW OFTEN DO YOU HAVE A DRINK CONTAINING ALCOHOL: NEVER

## 2025-01-22 ASSESSMENT — PAIN SCALES - GENERAL: PAINLEVEL_OUTOF10: 0

## 2025-01-22 ASSESSMENT — PAIN - FUNCTIONAL ASSESSMENT: PAIN_FUNCTIONAL_ASSESSMENT: 0-10

## 2025-01-22 NOTE — ED PROVIDER NOTES
I independently examined and evaluated Akbar Arias.    In brief, 89-year-old male presents to the emergency department for evaluation of dizziness.  Patient reports he was at physical therapy and was doing his workout had just finished his weightlifting routine and was walking across the hallway when he felt lightheaded.  He had blood pressures that were obtained and was told that he was orthostatic and sent over to the ER.  Says he tries not to drink water in the morning because it makes him urinate and he does not want to have to pee during his workout.  Took all of his morning blood pressure medications prior to going to his physical therapy.  Patient reports he feels totally at his baseline.  Wife and family says he has been doing well and denies having any concerns.  No leukocytosis.  Hemoglobin is 11.  Creatinine is 1.7, slightly worse compared to prior.  Troponin is 36.  Chest x-ray shows 7 mm nodular density in the right midlung, sixth rib interspace similar to 12/13/2024.  CT shows no acute intrathoracic abnormality.  Scattered pulmonary nodules measuring up to 9 mm in size.  Some were partially calcified.  Discussed the results of the workup and recommended outpatient follow-up.  Patient and family agreeable with plan.  They deny having any questions or concerns.  His repeat troponin was downtrending to 31.  His urinalysis with trace leukocyte, 6-9 WBC, and rare bacteria.  He was given prescription of cefdinir.  Discharged home with strict and precautions.    The Ekg interpreted by me shows  Sinus rhythm with first-degree AV block with occasional AV dual paced complexes and premature supraventricular complexes with frequent and consecutive premature ventricular complexes with a rate of 72   Axis is normal   QTc is prolonged at 455   ST Segments: Inferior infarct, age-undetermined     All diagnostic, treatment, and disposition decisions were made by myself in conjunction with the advanced practice

## 2025-01-22 NOTE — ED PROVIDER NOTES
[facility] EMERGENCY DEPARTMENT  EMERGENCY DEPARTMENT ENCOUNTER        Pt Name: Akbar Arias  MRN: [id number]  Birthdate 1935  Date of evaluation: 1/22/2025  Provider: DELFINO Hill  PCP: Damian Smiley MD  Note Started: 2:39 PM EST 1/22/25       I have seen and evaluated this patient with my supervising physician Dr. Hughes      CHIEF COMPLAINT       Chief Complaint   Patient presents with    Dizziness     Was feeling lightheaded and dizzy at physical therapy, was orthostatic, grandson believes that his pacemaker is not working properly       HISTORY OF PRESENT ILLNESS: 1 or more Elements     History From: Patient     Limitations to history : None    Social Determinants Significantly Affecting Health : None    Chief Complaint: Orthostatic hypotension    Akbar Arias is a 89 y.o. male who presents to the emergency department via EMS for orthostatic hypotension from physical therapy.  Although initial intake notes that the patient is dizzy, patient denies any feelings of lightheadedness or dizziness.  Family member states that he was sent in for abnormal vital signs.  Patient states that he does not know why he was here but they were concerned that his pacemaker may not be working.  He denies recent illness, falls, fevers, chills, chest pain, palpitations, shortness of breath.    Nursing Notes were all reviewed and agreed with or any disagreements were addressed in the HPI.    REVIEW OF SYSTEMS :      Review of Systems    Positives and Pertinent negatives as per HPI.     SURGICAL HISTORY     Past Surgical History:   Procedure Laterality Date    BACK SURGERY  1972    lumbar    EP DEVICE PROCEDURE Left 12/12/2024    Insert PPM dual performed by Desiree Mai MD at [facility] CARDIAC CATH LAB    EYE SURGERY Right 5/31/2013    cataract extraction with IOL implant    EYE SURGERY Left 6/14/13    phaco with IOL       CURRENTMEDICATIONS       Discharge Medication List as of 1/22/2025  5:49 PM

## 2025-01-22 NOTE — FLOWSHEET NOTE
[facility]- Outpatient Rehabilitation and Therapy  [address] [place] [address] office: [phone] fax: [phone]       Physical Therapy: TPROGRESS NOTE   Patient: Akbar Arias (89 y.o. male)   Examination Date: 2025   :  1935 MRN: [id number]   Visit #: 9   Insurance Allowable Auth Needed   20 visits (12-2 to 2-10-25) [x]Yes    []No    Insurance: Payor: [facility] MEDICARE / Plan: [facility] MEDICARE COMPLETE / Product Type: *No Product type* /   Insurance ID: [id number] - (Medicare Managed)  Secondary Insurance (if applicable):    Treatment Diagnosis:   1. Impaired gait and mobility  R26.89         2. Impaired tolerance of activity  R68.89         3. 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    Preferred Language for Healthcare:   [x] English       [] other:    SUBJECTIVE EXAMINATION   Patient stated complaint/comment: pt reports going to PCP next week for anxiety management. Discussed working on spacing out appointments due to patient high anxiety before 3 therapies.

## 2025-01-22 NOTE — DISCHARGE INSTRUCTIONS
Please call your primary care provider in the morning for further recommendations on your blood pressure medication regimen.  If you are unable to get in touch with them in the morning hold morning dose of hydralazine and only take the following 2 doses later in the day.  Please increase your fluid intake.  Please do not go to physical therapy until discussing with primary care.    A prescription for Omnicef, an antibiotic, was sent in for urinary tract infection, please take the full course of this medication.    Please follow-up with your primary care provider regarding pulmonary nodules that were seen on your chest x-ray and CT scan of your chest.

## 2025-01-23 ENCOUNTER — HOSPITAL ENCOUNTER (OUTPATIENT)
Dept: SPEECH THERAPY | Age: 89
Setting detail: THERAPIES SERIES
End: 2025-01-23
Attending: STUDENT IN AN ORGANIZED HEALTH CARE EDUCATION/TRAINING PROGRAM
Payer: MEDICARE

## 2025-01-23 ENCOUNTER — HOSPITAL ENCOUNTER (OUTPATIENT)
Dept: OCCUPATIONAL THERAPY | Age: 89
Setting detail: THERAPIES SERIES
End: 2025-01-23
Attending: STUDENT IN AN ORGANIZED HEALTH CARE EDUCATION/TRAINING PROGRAM
Payer: MEDICARE

## 2025-01-27 ENCOUNTER — HOSPITAL ENCOUNTER (OUTPATIENT)
Dept: OCCUPATIONAL THERAPY | Age: 89
Setting detail: THERAPIES SERIES
Discharge: HOME | End: 2025-01-27
Attending: STUDENT IN AN ORGANIZED HEALTH CARE EDUCATION/TRAINING PROGRAM
Payer: MEDICARE

## 2025-01-27 PROCEDURE — 97110 THERAPEUTIC EXERCISES: CPT

## 2025-01-27 PROCEDURE — 97530 THERAPEUTIC ACTIVITIES: CPT

## 2025-01-27 NOTE — PLAN OF CARE
endurance, AROM, proximal shoulder and UE for functional transfers/mobility, self-care, IADLs, and community re-entry  Therapeutic Activity (12974): Provided verbal/tactile cueing for activities related to improving balance, coordination, kinesthetic sense, posture, motor skill, proprioception and motor activation to allow for proper function of core, proximal shoulder and UE with self-care, and ADLs, IADLs, functional mobility, work-related and leisure based activities. Includes review/progression of HEP activities to improve balance, coordination, kinesthetic sense, posture, motor skill, proprioception, proximal shoulder and UE for functional transfers/mobility, self-care, IADLs, and community re-entry     TREATMENT PLAN     Frequency/Duration: 1-2x/week for 8 weeks for the following treatment interventions:    Planned Treatment Interventions/Procedural Codes include:  Therapeutic exercise (62463): including strength training, ROM, endurance training and flexibility  Neuromuscular re-education (48444): including facilitation of normal movement patterns, promoting postural alignment and stability during static and dynamic movement (sitting or standing), visual perceptual training, proprioception training, balance retraining during functional activities  Manual therapy (02482): including tissue mobilization, joint mobilization, massage, PROM  Modalities: as needed that may include thermal agents, attended E-stim (26569), Biofeedback, US (03894), iontophoresis as indicated  Therapeutic activity (04024): that may include patient/caregiver education/training, activity modification, increasing participation and independence with functional activities seated/standing, engagement in fine and gross motor activities, functional transfers and mobility  Sensory integration techniques (25913): that include enhancing sensory processing and promoting responses that are adaptive to environmental demands  Self-care/home management

## 2025-01-27 NOTE — FLOWSHEET NOTE
[facility]- Outpatient Rehabilitation and Therapy  [address] [place] [address] office: [phone] fax: [phone]       Daily Treatment Note/Progress Note   Patient: Akbar Arias (89 y.o. male)   Examination Date: 2025   :  1935 MRN: [id number]   Visit #: 8   Insurance Allowable:  BMN  Auth Needed: Yes, Approval dates: 24 - 25  Approved visits: 16 OT  Approved codes: not code specific  Codes that DO NOT require auth: NA    Insurance: Payor: [facility] MEDICARE / Plan: [facility] MEDICARE COMPLETE / Product Type: *No Product type* /   Insurance ID: [id number] - (Medicare Managed)  Secondary Insurance (if applicable):    Treatment Diagnosis:  M62.81 (ICD-10-CM) - Muscle weakness (generalized), R27.8 (ICD-10-CM) - Other lack of coordination, R53.1 (ICD-10-CM) - Weakness, Z74.1 (ICD-10-CM) - Need for assistance with personal care, and R41.9 (ICD-10-CM) - Unspecified symptoms and signs involving cognitive functions and awareness   Medical Diagnosis:  Acute CVA (cerebrovascular accident) (HCC) [I63.9]   Referring Provider: Geri Fraire MD  PCP: Damian Smiley MD       Plan of care signed: Geri Fields 24, 25    Date of Patient follow up with Physician:  TBD    POC update note: YES, Date Range for this report: 25 to 25  POC update due: (OR 10 visits /OR AUTH LIMITS, whichever is less) -  25  Recertification due: 25                                                          Precautions/ Contra-indications:   Latex allergy:   No  Pacemaker:     Yes  Other:  no lifting LUE overhead until 24    Red Flags:  None    Suicide Screening:   The patient did not verbalize a primary behavioral concern, suicidal ideation, suicidal intent, or demonstrate suicidal behaviors.    Preferred Language for Healthcare: English    Review Of Systems (ROS):  [x] Performed Review of systems (Integumentary, CardioPulmonary, Neurological) by intake

## 2025-01-28 ENCOUNTER — HOSPITAL ENCOUNTER (OUTPATIENT)
Dept: SPEECH THERAPY | Age: 89
Setting detail: THERAPIES SERIES
Discharge: HOME | End: 2025-01-28
Attending: STUDENT IN AN ORGANIZED HEALTH CARE EDUCATION/TRAINING PROGRAM
Payer: MEDICARE

## 2025-01-28 PROCEDURE — 97130 THER IVNTJ EA ADDL 15 MIN: CPT

## 2025-01-28 PROCEDURE — 97129 THER IVNTJ 1ST 15 MIN: CPT

## 2025-01-28 NOTE — FLOWSHEET NOTE
[facility]- Outpatient Rehabilitation and Therapy  [address] [place] [address] office: [phone] fax: [phone]       Speech Therapy: Daily Note   Patient: Akbar Arias (89 y.o. male)   Examination Date: 2025   :  1935 MRN: [id number]   Visit #: 11  Insurance Allowable Auth Needed    [x]Yes    []No    Insurance: Payor: [facility] MEDICARE / Plan: [facility] MEDICARE COMPLETE / Product Type: *No Product type* /   Insurance ID: [id number] - (Medicare Managed) 12 visits approved 25 to 25  Secondary Insurance (if applicable):    Treatment Diagnosis:  Cognitive Deficits [I69.319]    Medical Diagnosis:  Acute CVA (cerebrovascular accident) (HCC) [I63.9]   Referring Physician: Geri Fraire MD  PCP: Damian Smiley MD     Plan of care signed: YES    Functional Outcome Measure: Neuro-QOL Item Bank v2.0 - Cognition Function- Short Form (Sergo ):       Progress Report/POC: No  POC update due: (10 visits /OR 30 days /OR duration of POC, whichever is less): 2/10 or 2/15/25    Precautions/ Contra-indications:   Latex allergy: No  Pacemaker: No  Other:  Significant cognitive deficits       Preferred Language for Healthcare:   [x]English       []other:    SUBJECTIVE EXAMINATION     Patient Report/Comments:   The pt was in good spirits. He was accompanied by his grandson like usual.    Location: Pt seen in therapy office  Demeanor: Pleasant  and Cooperative  Motivated: [x]  Yes  []  No   Caregivers Present: [x]  Yes  []  No       OBJECTIVE EXAMINATION   Exercises/Interventions:   Cognitive Function (74055 & 73460) Accuracy Cues Notes   Orientation        Attention           Sustained         Selective Task requiring him to view given words and rodrigo those that only meeting 2 forms of criteria: The pt became easily confused during this task needing ongoing cues and repeating of instructions.      Task requiring him to listen to given words, indicating when the words

## 2025-01-29 ENCOUNTER — HOSPITAL ENCOUNTER (OUTPATIENT)
Dept: PHYSICAL THERAPY | Age: 89
Setting detail: THERAPIES SERIES
End: 2025-01-29
Attending: STUDENT IN AN ORGANIZED HEALTH CARE EDUCATION/TRAINING PROGRAM
Payer: MEDICARE

## 2025-01-30 ENCOUNTER — APPOINTMENT (OUTPATIENT)
Dept: GENERAL RADIOLOGY | Age: 89
DRG: 948 | End: 2025-01-30
Payer: MEDICARE

## 2025-01-30 ENCOUNTER — HOSPITAL ENCOUNTER (INPATIENT)
Age: 89
LOS: 3 days | Discharge: HOME HEALTH | DRG: 948 | End: 2025-02-04
Attending: STUDENT IN AN ORGANIZED HEALTH CARE EDUCATION/TRAINING PROGRAM | Admitting: INTERNAL MEDICINE
Payer: MEDICARE

## 2025-01-30 DIAGNOSIS — I95.1 ORTHOSTATIC HYPOTENSION: Primary | ICD-10-CM

## 2025-01-30 DIAGNOSIS — I49.3 FREQUENT PVCS: ICD-10-CM

## 2025-01-30 PROBLEM — R53.1 WEAKNESS: Status: ACTIVE | Noted: 2025-01-30

## 2025-01-30 LAB
ALBUMIN SERPL-MCNC: 3.8 G/DL (ref 3.4–5)
ALBUMIN/GLOB SERPL: 1.6 {RATIO} (ref 1.1–2.2)
ALP SERPL-CCNC: 76 U/L (ref 40–129)
ALT SERPL-CCNC: 19 U/L (ref 10–40)
ANION GAP SERPL CALCULATED.3IONS-SCNC: 11 MMOL/L (ref 3–16)
AST SERPL-CCNC: 21 U/L (ref 15–37)
BASOPHILS # BLD: 0.1 K/UL (ref 0–0.2)
BASOPHILS NFR BLD: 1.2 %
BILIRUB SERPL-MCNC: 0.4 MG/DL (ref 0–1)
BILIRUB UR QL STRIP.AUTO: NEGATIVE
BUN SERPL-MCNC: 17 MG/DL (ref 7–20)
CALCIUM SERPL-MCNC: 9.5 MG/DL (ref 8.3–10.6)
CHLORIDE SERPL-SCNC: 98 MMOL/L (ref 99–110)
CLARITY UR: CLEAR
CO2 SERPL-SCNC: 25 MMOL/L (ref 21–32)
COLOR UR: YELLOW
CREAT SERPL-MCNC: 1.5 MG/DL (ref 0.8–1.3)
DEPRECATED RDW RBC AUTO: 13.4 % (ref 12.4–15.4)
EOSINOPHIL # BLD: 0.1 K/UL (ref 0–0.6)
EOSINOPHIL NFR BLD: 1.7 %
EPI CELLS #/AREA URNS HPF: ABNORMAL /HPF (ref 0–5)
FLUAV RNA RESP QL NAA+PROBE: NOT DETECTED
FLUBV RNA RESP QL NAA+PROBE: NOT DETECTED
GFR SERPLBLD CREATININE-BSD FMLA CKD-EPI: 44 ML/MIN/{1.73_M2}
GLUCOSE BLD-MCNC: 115 MG/DL (ref 70–99)
GLUCOSE SERPL-MCNC: 103 MG/DL (ref 70–99)
GLUCOSE UR STRIP.AUTO-MCNC: NEGATIVE MG/DL
HCT VFR BLD AUTO: 32.4 % (ref 40.5–52.5)
HGB BLD-MCNC: 11.2 G/DL (ref 13.5–17.5)
HGB UR QL STRIP.AUTO: ABNORMAL
KETONES UR STRIP.AUTO-MCNC: NEGATIVE MG/DL
LEUKOCYTE ESTERASE UR QL STRIP.AUTO: NEGATIVE
LYMPHOCYTES # BLD: 1.2 K/UL (ref 1–5.1)
LYMPHOCYTES NFR BLD: 15 %
MAGNESIUM SERPL-MCNC: 1.89 MG/DL (ref 1.8–2.4)
MCH RBC QN AUTO: 33 PG (ref 26–34)
MCHC RBC AUTO-ENTMCNC: 34.7 G/DL (ref 31–36)
MCV RBC AUTO: 95 FL (ref 80–100)
MONOCYTES # BLD: 0.9 K/UL (ref 0–1.3)
MONOCYTES NFR BLD: 10.8 %
NEUTROPHILS # BLD: 5.7 K/UL (ref 1.7–7.7)
NEUTROPHILS NFR BLD: 71.3 %
NITRITE UR QL STRIP.AUTO: NEGATIVE
NT-PROBNP SERPL-MCNC: 1263 PG/ML (ref 0–449)
PERFORMED ON: ABNORMAL
PH UR STRIP.AUTO: 5.5 [PH] (ref 5–8)
PLATELET # BLD AUTO: 317 K/UL (ref 135–450)
PMV BLD AUTO: 8.6 FL (ref 5–10.5)
POTASSIUM SERPL-SCNC: 3.9 MMOL/L (ref 3.5–5.1)
PROT SERPL-MCNC: 6.2 G/DL (ref 6.4–8.2)
PROT UR STRIP.AUTO-MCNC: NEGATIVE MG/DL
RBC # BLD AUTO: 3.41 M/UL (ref 4.2–5.9)
RBC #/AREA URNS HPF: ABNORMAL /HPF (ref 0–4)
SARS-COV-2 RNA RESP QL NAA+PROBE: NOT DETECTED
SODIUM SERPL-SCNC: 134 MMOL/L (ref 136–145)
SP GR UR STRIP.AUTO: 1.01 (ref 1–1.03)
TROPONIN, HIGH SENSITIVITY: 33 NG/L (ref 0–22)
UA COMPLETE W REFLEX CULTURE PNL UR: ABNORMAL
UA DIPSTICK W REFLEX MICRO PNL UR: YES
URN SPEC COLLECT METH UR: ABNORMAL
UROBILINOGEN UR STRIP-ACNC: 0.2 E.U./DL
WBC # BLD AUTO: 8 K/UL (ref 4–11)
WBC #/AREA URNS HPF: ABNORMAL /HPF (ref 0–5)

## 2025-01-30 PROCEDURE — 36415 COLL VENOUS BLD VENIPUNCTURE: CPT

## 2025-01-30 PROCEDURE — 2580000003 HC RX 258: Performed by: INTERNAL MEDICINE

## 2025-01-30 PROCEDURE — 2580000003 HC RX 258

## 2025-01-30 PROCEDURE — 84484 ASSAY OF TROPONIN QUANT: CPT

## 2025-01-30 PROCEDURE — 6370000000 HC RX 637 (ALT 250 FOR IP): Performed by: INTERNAL MEDICINE

## 2025-01-30 PROCEDURE — 71045 X-RAY EXAM CHEST 1 VIEW: CPT

## 2025-01-30 PROCEDURE — G0378 HOSPITAL OBSERVATION PER HR: HCPCS

## 2025-01-30 PROCEDURE — 80053 COMPREHEN METABOLIC PANEL: CPT

## 2025-01-30 PROCEDURE — 99285 EMERGENCY DEPT VISIT HI MDM: CPT

## 2025-01-30 PROCEDURE — 83735 ASSAY OF MAGNESIUM: CPT

## 2025-01-30 PROCEDURE — 2500000003 HC RX 250 WO HCPCS: Performed by: INTERNAL MEDICINE

## 2025-01-30 PROCEDURE — 87636 SARSCOV2 & INF A&B AMP PRB: CPT

## 2025-01-30 PROCEDURE — 93005 ELECTROCARDIOGRAM TRACING: CPT | Performed by: STUDENT IN AN ORGANIZED HEALTH CARE EDUCATION/TRAINING PROGRAM

## 2025-01-30 PROCEDURE — 83880 ASSAY OF NATRIURETIC PEPTIDE: CPT

## 2025-01-30 PROCEDURE — 81001 URINALYSIS AUTO W/SCOPE: CPT

## 2025-01-30 PROCEDURE — 96360 HYDRATION IV INFUSION INIT: CPT

## 2025-01-30 PROCEDURE — 85025 COMPLETE CBC W/AUTO DIFF WBC: CPT

## 2025-01-30 RX ORDER — SODIUM CHLORIDE 9 MG/ML
INJECTION, SOLUTION INTRAVENOUS CONTINUOUS
Status: ACTIVE | OUTPATIENT
Start: 2025-01-30 | End: 2025-01-31

## 2025-01-30 RX ORDER — MECLIZINE HCL 12.5 MG 12.5 MG/1
12.5 TABLET ORAL
COMMUNITY

## 2025-01-30 RX ORDER — GLUCAGON 1 MG/ML
1 KIT INJECTION PRN
Status: DISCONTINUED | OUTPATIENT
Start: 2025-01-30 | End: 2025-02-04 | Stop reason: HOSPADM

## 2025-01-30 RX ORDER — ATORVASTATIN CALCIUM 40 MG/1
40 TABLET, FILM COATED ORAL NIGHTLY
Status: DISCONTINUED | OUTPATIENT
Start: 2025-01-30 | End: 2025-02-04 | Stop reason: HOSPADM

## 2025-01-30 RX ORDER — DEXTROSE MONOHYDRATE 100 MG/ML
INJECTION, SOLUTION INTRAVENOUS CONTINUOUS PRN
Status: DISCONTINUED | OUTPATIENT
Start: 2025-01-30 | End: 2025-02-04 | Stop reason: HOSPADM

## 2025-01-30 RX ORDER — ENOXAPARIN SODIUM 100 MG/ML
40 INJECTION SUBCUTANEOUS DAILY
Status: DISCONTINUED | OUTPATIENT
Start: 2025-01-31 | End: 2025-02-04 | Stop reason: HOSPADM

## 2025-01-30 RX ORDER — ONDANSETRON 4 MG/1
4 TABLET, ORALLY DISINTEGRATING ORAL EVERY 8 HOURS PRN
Status: DISCONTINUED | OUTPATIENT
Start: 2025-01-30 | End: 2025-02-04 | Stop reason: HOSPADM

## 2025-01-30 RX ORDER — CARVEDILOL 3.12 MG/1
3.12 TABLET ORAL 2 TIMES DAILY WITH MEALS
Status: DISCONTINUED | OUTPATIENT
Start: 2025-01-31 | End: 2025-02-04 | Stop reason: HOSPADM

## 2025-01-30 RX ORDER — POTASSIUM CHLORIDE 1500 MG/1
40 TABLET, EXTENDED RELEASE ORAL PRN
Status: DISCONTINUED | OUTPATIENT
Start: 2025-01-30 | End: 2025-02-04 | Stop reason: HOSPADM

## 2025-01-30 RX ORDER — CLOPIDOGREL BISULFATE 75 MG/1
75 TABLET ORAL DAILY
Status: DISCONTINUED | OUTPATIENT
Start: 2025-01-31 | End: 2025-02-04 | Stop reason: HOSPADM

## 2025-01-30 RX ORDER — MAGNESIUM SULFATE IN WATER 40 MG/ML
2000 INJECTION, SOLUTION INTRAVENOUS PRN
Status: DISCONTINUED | OUTPATIENT
Start: 2025-01-30 | End: 2025-02-04 | Stop reason: HOSPADM

## 2025-01-30 RX ORDER — SENNA AND DOCUSATE SODIUM 50; 8.6 MG/1; MG/1
1 TABLET, FILM COATED ORAL 2 TIMES DAILY
Status: DISCONTINUED | OUTPATIENT
Start: 2025-01-30 | End: 2025-02-04 | Stop reason: HOSPADM

## 2025-01-30 RX ORDER — SODIUM CHLORIDE 0.9 % (FLUSH) 0.9 %
5-40 SYRINGE (ML) INJECTION EVERY 12 HOURS SCHEDULED
Status: DISCONTINUED | OUTPATIENT
Start: 2025-01-30 | End: 2025-02-04 | Stop reason: HOSPADM

## 2025-01-30 RX ORDER — POTASSIUM CHLORIDE 7.45 MG/ML
10 INJECTION INTRAVENOUS PRN
Status: DISCONTINUED | OUTPATIENT
Start: 2025-01-30 | End: 2025-02-04 | Stop reason: HOSPADM

## 2025-01-30 RX ORDER — ESCITALOPRAM OXALATE 10 MG/1
5 TABLET ORAL DAILY
Status: DISCONTINUED | OUTPATIENT
Start: 2025-01-31 | End: 2025-02-04 | Stop reason: HOSPADM

## 2025-01-30 RX ORDER — ASPIRIN 325 MG
325 TABLET, DELAYED RELEASE (ENTERIC COATED) ORAL DAILY
Status: DISCONTINUED | OUTPATIENT
Start: 2025-01-31 | End: 2025-02-04 | Stop reason: HOSPADM

## 2025-01-30 RX ORDER — ACETAMINOPHEN 650 MG/1
650 SUPPOSITORY RECTAL EVERY 6 HOURS PRN
Status: DISCONTINUED | OUTPATIENT
Start: 2025-01-30 | End: 2025-02-04 | Stop reason: HOSPADM

## 2025-01-30 RX ORDER — ONDANSETRON 2 MG/ML
4 INJECTION INTRAMUSCULAR; INTRAVENOUS EVERY 6 HOURS PRN
Status: DISCONTINUED | OUTPATIENT
Start: 2025-01-30 | End: 2025-02-04 | Stop reason: HOSPADM

## 2025-01-30 RX ORDER — ESCITALOPRAM OXALATE 10 MG/1
10 TABLET ORAL DAILY
COMMUNITY

## 2025-01-30 RX ORDER — INSULIN LISPRO 100 [IU]/ML
0-4 INJECTION, SOLUTION INTRAVENOUS; SUBCUTANEOUS
Status: DISCONTINUED | OUTPATIENT
Start: 2025-01-30 | End: 2025-02-04 | Stop reason: HOSPADM

## 2025-01-30 RX ORDER — SODIUM CHLORIDE 9 MG/ML
INJECTION, SOLUTION INTRAVENOUS PRN
Status: DISCONTINUED | OUTPATIENT
Start: 2025-01-30 | End: 2025-02-04 | Stop reason: HOSPADM

## 2025-01-30 RX ORDER — POLYETHYLENE GLYCOL 3350 17 G/17G
17 POWDER, FOR SOLUTION ORAL DAILY PRN
Status: DISCONTINUED | OUTPATIENT
Start: 2025-01-30 | End: 2025-02-04 | Stop reason: HOSPADM

## 2025-01-30 RX ORDER — ACETAMINOPHEN 325 MG/1
650 TABLET ORAL EVERY 6 HOURS PRN
Status: DISCONTINUED | OUTPATIENT
Start: 2025-01-30 | End: 2025-02-04 | Stop reason: HOSPADM

## 2025-01-30 RX ORDER — SODIUM CHLORIDE 0.9 % (FLUSH) 0.9 %
5-40 SYRINGE (ML) INJECTION PRN
Status: DISCONTINUED | OUTPATIENT
Start: 2025-01-30 | End: 2025-02-04 | Stop reason: HOSPADM

## 2025-01-30 RX ORDER — 0.9 % SODIUM CHLORIDE 0.9 %
500 INTRAVENOUS SOLUTION INTRAVENOUS ONCE
Status: COMPLETED | OUTPATIENT
Start: 2025-01-30 | End: 2025-01-30

## 2025-01-30 RX ADMIN — ATORVASTATIN CALCIUM 40 MG: 40 TABLET, FILM COATED ORAL at 20:37

## 2025-01-30 RX ADMIN — SODIUM CHLORIDE 500 ML: 9 INJECTION, SOLUTION INTRAVENOUS at 13:46

## 2025-01-30 RX ADMIN — SODIUM CHLORIDE, PRESERVATIVE FREE 10 ML: 5 INJECTION INTRAVENOUS at 20:36

## 2025-01-30 RX ADMIN — SODIUM CHLORIDE: 9 INJECTION, SOLUTION INTRAVENOUS at 22:09

## 2025-01-30 RX ADMIN — SENNOSIDES AND DOCUSATE SODIUM 1 TABLET: 50; 8.6 TABLET ORAL at 20:37

## 2025-01-30 NOTE — ED NOTES
Akbar Arias is a 89 y.o. male admitted for  Active Problems:    * No active hospital problems. *  Resolved Problems:    * No resolved hospital problems. *  .   Patient ECF via EMS transportation with   Chief Complaint   Patient presents with    Extremity Weakness     Fatigue started this morning bs 112, no pain, started using a walker yesterday to help, hx of strokes, seizures and pacemaker last month    .  Patient is alert and Person and Place  Patient's baseline mobility: Baseline Mobility: Bed bound   Code Status: Prior   Cardiac Rhythm:       Is patient on baseline Oxygen: no   Isolation: None      NIH Score:    C-SSRS: Risk of Suicide: No Risk  Bedside swallow:        Active LDA's:   Peripheral IV 01/30/25 Left;Ventral Forearm (Active)   Site Assessment Clean, dry & intact 01/30/25 1023   Line Status Blood return noted 01/30/25 1023   Line Care Cap changed 01/30/25 1023           Family/Caregiver Present yes Any Concerns: no   Restraints no  Sitter no         Vitals:      Vitals:    01/30/25 1022 01/30/25 1155 01/30/25 1222 01/30/25 1326   BP: (!) 142/67 131/68  136/63   Pulse: 66 64 69 73   Resp: 18  10 11   Temp: 97.7 °F (36.5 °C)      SpO2: 95% 94% 93% 93%       Last documented pain score (0-10 scale)    Pain medication administered No.    Pertinent or High Risk Medications/Drips: No.    Pending Blood Product Administration: no    Abnormal labs:   Abnormal Labs Reviewed   CBC WITH AUTO DIFFERENTIAL - Abnormal; Notable for the following components:       Result Value    RBC 3.41 (*)     Hemoglobin 11.2 (*)     Hematocrit 32.4 (*)     All other components within normal limits   COMPREHENSIVE METABOLIC PANEL - Abnormal; Notable for the following components:    Sodium 134 (*)     Chloride 98 (*)     Glucose 103 (*)     Creatinine 1.5 (*)     Est, Glom Filt Rate 44 (*)     Total Protein 6.2 (*)     All other components within normal limits   TROPONIN - Abnormal; Notable for the following components:     Troponin, High Sensitivity 33 (*)     All other components within normal limits   BRAIN NATRIURETIC PEPTIDE - Abnormal; Notable for the following components:    NT Pro-BNP 1,263 (*)     All other components within normal limits     Critical values: no  Intervention for critical value(s):     Abnormal Imaging: no             You may also review the ED PT Care Timeline found under the Summary Tab, ED Encounter Summary, Timeline Reports, ED Patient Care Timeline.     Recommendation    Pending orders/Uncompleted orders to hand off:  see admission orders    Additional Comments:   If any further questions, please call Sending RN at call ed .

## 2025-01-30 NOTE — H&P
Hospital Medicine History & Physical    V 1.6    Date of Admission: 1/30/2025    Date of Service:  Pt seen/examined on 1/30/25     []Admitted to Inpatient with expected LOS greater than two midnights due to medical therapy.  [x]Placed in Observation status.    Chief Admission Complaint:  weakness    Presenting Admission History:      89 y.o. male with hx of cva, htn, hld, dm2 , pacer(for bradycardia, placed 12/[facility]24 at [facility], Dr. Mai) rwho presented to [facility] with weakness. Pt was noted to have some mild weakness yesterday(per wife) but not significant. Today, he tried to get up out of bed in the morning and his legs gave out under him. He was not able to lift himself back up, so his grandson had to come help him up. Pt was brought in by EMS.  He denies any palpitations.    -Pt is aware of ongoing orthostatic hypotension and states his PCP is making changes to home meds of late(norvasc is now 2.5mg daily, hydralazine has been stopped, pt started on lexapro)  -of note, pt was recently diagnosed with UTI in the past 1-2 weeks(started taking Cefdinir on 1/22/25 and completed 7 day course).  He notes losing weight as well as poor fluid intake per wife. He notes lack of taste in food in general since his stroke(pt went to ).  -he is also on a new med for dizziness, (meclizine)  No prior of fall      Assessment/Plan:      Current Principal Problem:  Weakness    Generalized weakness- posssibly multifactorial(bp med, ?autonomic dysfcn, weight loss and FTT)  -Pt/ot eval ordered  -tele  -Pending pacer interrogation, ordered in ER  -repeat am orthostatics  -Held bp meds except for coreg  -start jocelin hose  -Check cortisiol/tfts    HTN- stable but drops with standing  -continued Coreg  -Held norvasc(pt on 2.5)  -Pt no longer taking hydralszine  -Held arb    HLD-statin    Dm2- per emr  Held metformin  Ac/hs bs  ssi    Prior CVA- no issues per pt  Asa/plavix/statin    Anxiety- on lexapro, dose reduced to

## 2025-01-30 NOTE — ED PROVIDER NOTES
The Ekg interpreted by me shows  normal pacemaker rhythm with a rate of 67, PVCs  Axis is   Normal  QTc is  within an acceptable range  Intervals and Durations are unremarkable.      ST Segments: nonspecific changes  No significant change from prior EKG dated 1/22/2025         Vanessa Owens MD  01/30/25 1042

## 2025-01-30 NOTE — ED PROVIDER NOTES
[facility] - MED SURG/ORTHO  Emergency Department Encounter    Patient Name: Akbar Arias  MRN: [id number]  YOB: 1935  Date of Evaluation: 1/30/2025  Provider: Damian Smiley MD  Note Started: 10:26 AM EST 1/30/25    CHIEF COMPLAINT  Extremity Weakness (Fatigue started this morning bs 112, no pain, started using a walker yesterday to help, hx of strokes, seizures and pacemaker last month )    SHARED SERVICE VISIT   I have seen and evaluated this patient with my supervising physician, Dr. Owens.    HISTORY OF PRESENT ILLNESS  History From: Patient.    Limitations to history : None    Social Determinants Significantly Affecting Health : None    Akbar Arias is a 89 y.o. male with history of recent CVA in October 2024 as well as recent pacemaker placement for bradycardia last month who presents to the ED for evaluation of generalized fatigue.  Patient is currently in physical therapy to regain his strength after previous stroke.  Patient was diagnosed with bradycardia at the beginning of the last month and had pacemaker placement.  8 days ago the patient was diagnosed with orthostatic hypotension and lung nodules.  Patient states that over the past 3 days he has been having progressively worsening generalized fatigue.  Has been able to ambulate however states that he will get very lightheaded feeling like he could pass out.  Patient denies any pain.  Patient denies fever, chills, body aches, headache, cough, shortness of breath, chest pain, abdominal pain, nausea, vomiting, changes in bowels, dysuria, hematuria, neck pain, back pain, and rash.    No other complaints, modifying factors or associated symptoms.     Nursing notes reviewed were all reviewed and agreed with or any disagreements were addressed in the HPI.    PMH:  Past Medical History:   Diagnosis Date    Cerebral artery occlusion with cerebral infarction (HCC)     Hyperlipidemia     Hypertension     Seizures (HCC)      Surgical  GLUCOSE   POCT GLUCOSE   POCT GLUCOSE   POCT GLUCOSE     When ordered, only abnormal lab results are displayed.  All other labs were within normal range or not returned as of this dictation.     RADIOLOGY  Non-plain film images such as CT, U/S, and MRI are read by the radiologist.  Plain radiographic images are visualized and preliminarily interpreted by the ED Provider with the below findings:     Interpretation per the Radiologist below, if available at the time of this note:  XR CHEST PORTABLE   Final Result   1.  No acute cardiopulmonary findings.      Electronically signed by Navin Starr        PROCEDURES  Unless otherwise noted below, none.    ED COURSE/DDx/MDM  Vitals:  Vitals:    01/30/25 1655 01/30/25 1829 01/30/25 2000 01/30/25 2034   BP: (!) 149/66 (!) 168/70  (!) 151/63   Pulse: 60 63  60   Resp: 10 16  14   Temp:  97.3 °F (36.3 °C)  97.9 °F (36.6 °C)   TempSrc:  Oral  Oral   SpO2: 95% 97%  96%   Weight:   83.6 kg (184 lb 4.9 oz)      Patient received following medications in ED:  Medications   carvedilol (COREG) tablet 3.125 mg (has no administration in time range)   clopidogrel (PLAVIX) tablet 75 mg (has no administration in time range)   atorvastatin (LIPITOR) tablet 40 mg (40 mg Oral Given 1/30/25 2037)   aspirin EC tablet 325 mg (has no administration in time range)   sennosides-docusate sodium (SENOKOT-S) 8.6-50 MG tablet 1 tablet (1 tablet Oral Given 1/30/25 2037)   escitalopram (LEXAPRO) tablet 5 mg (has no administration in time range)   sodium chloride flush 0.9 % injection 5-40 mL (10 mLs IntraVENous Given 1/30/25 2036)   sodium chloride flush 0.9 % injection 5-40 mL (has no administration in time range)   0.9 % sodium chloride infusion (has no administration in time range)   potassium chloride (KLOR-CON M) extended release tablet 40 mEq (has no administration in time range)     Or   potassium bicarb-citric acid (EFFER-K) effervescent tablet 40 mEq (has no administration in time range)

## 2025-01-31 ENCOUNTER — APPOINTMENT (OUTPATIENT)
Dept: SPEECH THERAPY | Age: 89
End: 2025-01-31
Attending: STUDENT IN AN ORGANIZED HEALTH CARE EDUCATION/TRAINING PROGRAM
Payer: MEDICARE

## 2025-01-31 LAB
ANION GAP SERPL CALCULATED.3IONS-SCNC: 6 MMOL/L (ref 3–16)
BASOPHILS # BLD: 0.1 K/UL (ref 0–0.2)
BASOPHILS NFR BLD: 0.8 %
BUN SERPL-MCNC: 18 MG/DL (ref 7–20)
CALCIUM SERPL-MCNC: 9.4 MG/DL (ref 8.3–10.6)
CHLORIDE SERPL-SCNC: 98 MMOL/L (ref 99–110)
CO2 SERPL-SCNC: 26 MMOL/L (ref 21–32)
CREAT SERPL-MCNC: 1.3 MG/DL (ref 0.8–1.3)
DEPRECATED RDW RBC AUTO: 12.9 % (ref 12.4–15.4)
EKG ATRIAL RATE: 67 BPM
EKG DIAGNOSIS: NORMAL
EKG P AXIS: -3 DEGREES
EKG P-R INTERVAL: 234 MS
EKG Q-T INTERVAL: 430 MS
EKG QRS DURATION: 110 MS
EKG QTC CALCULATION (BAZETT): 454 MS
EKG R AXIS: -25 DEGREES
EKG T AXIS: 30 DEGREES
EKG VENTRICULAR RATE: 67 BPM
EOSINOPHIL # BLD: 0.1 K/UL (ref 0–0.6)
EOSINOPHIL NFR BLD: 1.9 %
GFR SERPLBLD CREATININE-BSD FMLA CKD-EPI: 53 ML/MIN/{1.73_M2}
GLUCOSE BLD-MCNC: 104 MG/DL (ref 70–99)
GLUCOSE BLD-MCNC: 110 MG/DL (ref 70–99)
GLUCOSE BLD-MCNC: 119 MG/DL (ref 70–99)
GLUCOSE BLD-MCNC: 93 MG/DL (ref 70–99)
GLUCOSE SERPL-MCNC: 90 MG/DL (ref 70–99)
HCT VFR BLD AUTO: 30.3 % (ref 40.5–52.5)
HGB BLD-MCNC: 10.6 G/DL (ref 13.5–17.5)
LYMPHOCYTES # BLD: 1.1 K/UL (ref 1–5.1)
LYMPHOCYTES NFR BLD: 15.2 %
MCH RBC QN AUTO: 33.2 PG (ref 26–34)
MCHC RBC AUTO-ENTMCNC: 35 G/DL (ref 31–36)
MCV RBC AUTO: 94.9 FL (ref 80–100)
MONOCYTES # BLD: 1.1 K/UL (ref 0–1.3)
MONOCYTES NFR BLD: 14.8 %
NEUTROPHILS # BLD: 4.9 K/UL (ref 1.7–7.7)
NEUTROPHILS NFR BLD: 67.3 %
PERFORMED ON: ABNORMAL
PERFORMED ON: NORMAL
PLATELET # BLD AUTO: 273 K/UL (ref 135–450)
PMV BLD AUTO: 8.8 FL (ref 5–10.5)
POTASSIUM SERPL-SCNC: 3.7 MMOL/L (ref 3.5–5.1)
RBC # BLD AUTO: 3.19 M/UL (ref 4.2–5.9)
SODIUM SERPL-SCNC: 130 MMOL/L (ref 136–145)
WBC # BLD AUTO: 7.3 K/UL (ref 4–11)

## 2025-01-31 PROCEDURE — 85025 COMPLETE CBC W/AUTO DIFF WBC: CPT

## 2025-01-31 PROCEDURE — 97110 THERAPEUTIC EXERCISES: CPT

## 2025-01-31 PROCEDURE — 97530 THERAPEUTIC ACTIVITIES: CPT

## 2025-01-31 PROCEDURE — 93010 ELECTROCARDIOGRAM REPORT: CPT | Performed by: INTERNAL MEDICINE

## 2025-01-31 PROCEDURE — 97162 PT EVAL MOD COMPLEX 30 MIN: CPT

## 2025-01-31 PROCEDURE — 97166 OT EVAL MOD COMPLEX 45 MIN: CPT

## 2025-01-31 PROCEDURE — 6370000000 HC RX 637 (ALT 250 FOR IP): Performed by: INTERNAL MEDICINE

## 2025-01-31 PROCEDURE — 82533 TOTAL CORTISOL: CPT

## 2025-01-31 PROCEDURE — 80048 BASIC METABOLIC PNL TOTAL CA: CPT

## 2025-01-31 PROCEDURE — 84443 ASSAY THYROID STIM HORMONE: CPT

## 2025-01-31 PROCEDURE — 36415 COLL VENOUS BLD VENIPUNCTURE: CPT

## 2025-01-31 PROCEDURE — 2500000003 HC RX 250 WO HCPCS: Performed by: INTERNAL MEDICINE

## 2025-01-31 PROCEDURE — G0378 HOSPITAL OBSERVATION PER HR: HCPCS

## 2025-01-31 PROCEDURE — 84439 ASSAY OF FREE THYROXINE: CPT

## 2025-01-31 PROCEDURE — 6360000002 HC RX W HCPCS: Performed by: INTERNAL MEDICINE

## 2025-01-31 RX ADMIN — ENOXAPARIN SODIUM 40 MG: 100 INJECTION SUBCUTANEOUS at 09:09

## 2025-01-31 RX ADMIN — SENNOSIDES AND DOCUSATE SODIUM 1 TABLET: 50; 8.6 TABLET ORAL at 09:09

## 2025-01-31 RX ADMIN — CARVEDILOL 3.12 MG: 3.12 TABLET, FILM COATED ORAL at 09:09

## 2025-01-31 RX ADMIN — SODIUM CHLORIDE, PRESERVATIVE FREE 10 ML: 5 INJECTION INTRAVENOUS at 09:09

## 2025-01-31 RX ADMIN — SENNOSIDES AND DOCUSATE SODIUM 1 TABLET: 50; 8.6 TABLET ORAL at 20:51

## 2025-01-31 RX ADMIN — ATORVASTATIN CALCIUM 40 MG: 40 TABLET, FILM COATED ORAL at 20:51

## 2025-01-31 RX ADMIN — CARVEDILOL 3.12 MG: 3.12 TABLET, FILM COATED ORAL at 17:12

## 2025-01-31 RX ADMIN — ASPIRIN 325 MG: 325 TABLET, COATED ORAL at 09:09

## 2025-01-31 RX ADMIN — SODIUM CHLORIDE, PRESERVATIVE FREE 10 ML: 5 INJECTION INTRAVENOUS at 20:51

## 2025-01-31 RX ADMIN — CLOPIDOGREL BISULFATE 75 MG: 75 TABLET ORAL at 09:09

## 2025-01-31 RX ADMIN — ESCITALOPRAM OXALATE 5 MG: 10 TABLET ORAL at 09:09

## 2025-01-31 ASSESSMENT — PAIN SCALES - GENERAL
PAINLEVEL_OUTOF10: 0
PAINLEVEL_OUTOF10: 0

## 2025-01-31 NOTE — PROGRESS NOTES
[facility] Hospital Medicine Progress Note  V 1.6      Date of Admission: 1/30/2025    Hospital Day: 2      Chief Admission Complaint:  weakness    Subjective:  feels better today, worked with pt/ot, bp still dropped,    Presenting Admission History:         89 y.o. male with hx of cva, htn, hld, dm2 , pacer(for bradycardia, placed 12/2024 at [facility], Dr. Mai) rwho presented to [facility] with weakness. Pt was noted to have some mild weakness yesterday(per wife) but not significant. Today, he tried to get up out of bed in the morning and his legs gave out under him. He was not able to lift himself back up, so his grandson had to come help him up. Pt was brought in by EMS.  He denies any palpitations.    -Pt is aware of ongoing orthostatic hypotension and states his PCP is making changes to home meds of late(norvasc is now 2.5mg daily, hydralazine has been stopped, pt started on lexapro)  -of note, pt was recently diagnosed with UTI in the past 1-2 weeks(started taking Cefdinir on 1/22/25 and completed 7 day course).  He notes losing weight as well as poor fluid intake per wife. He notes lack of taste in food in general since his stroke(pt went to ).  -he is also on a new med for dizziness, (meclizine)  No prior of fall    Assessment/Plan:      Current Principal Problem:  Weakness      Generalized weakness- posssibly multifactorial(bp med, ?autonomic dysfcn causing orthostatic hypotension, weight loss and FTT)  -Pt/ot eval ordered  -tele  -Pending pacer interrogation, ordered in ER and pending  -repeat am orthostatics still were positive on 1/31  -Held bp meds except for coreg  -start jocelin hose  -Checked cortisiol/tfts     HTN- stable but drops with standing  -continued Coreg  -stopped norvasc(pt on 2.5mg)  -Pt no longer taking hydralszine  -Held arb     HLD-statin     Dm2- per emr  Held metformin  Ac/hs bs  ssi     Prior CVA- no issues per pt  Asa/plavix/statin     Anxiety- on lexapro, dose reduced to 5mg  Management  [] Discussed management of the case with           Labs:  Personally reviewed on 1/31/2025 and interpreted for clinical significance as documented above.     Recent Labs     01/30/25  1024 01/31/25  0552   WBC 8.0 7.3   HGB 11.2* 10.6*   HCT 32.4* 30.3*    273     Recent Labs     01/30/25  1024 01/31/25  0552   * 130*   K 3.9 3.7   CL 98* 98*   CO2 25 26   BUN 17 18   CREATININE 1.5* 1.3   CALCIUM 9.5 9.4   MG 1.89  --      Recent Labs     01/30/25  1024   PROBNP 1,263*   TROPHS 33*     No results for input(s): \"LABA1C\" in the last 72 hours.  Recent Labs     01/30/25  1024   AST 21   ALT 19   BILITOT 0.4   ALKPHOS 76     No results for input(s): \"INR\", \"LACTA\", \"TSH\" in the last 72 hours.    Urine Cultures: No results found for: \"LABURIN\"  Blood Cultures: No results found for: \"BC\"  No results found for: \"BLOODCULT2\"  Organism: No results found for: \"ORG\"      Miguel Angel Rose MD

## 2025-01-31 NOTE — CONSULTS
[facility] Contin[facility] Nurse  Consult Note       NAME:  Akbar Arias  MEDICAL RECORD NUMBER:  [id number]  AGE: 89 y.o.   GENDER: male  : 1935  TODAY'S DATE:  2025    Subjective; I turn to left side better.   Reason for WOCN Evaluation and Assessment: stage 2 coccyx      Wound care signing off       Akbar Arias is a 89 y.o. male referred by:   [] Physician  [x] Nursing  [] Other:     Wound Identification:  Wound Type: pressure  Contributing Factors: chronic pressure, decreased mobility, shear force, and obesity    Wound History: present on admission  Current Wound Care Treatment:  WISAM    Patient Goal of Care:  [x] Wound Healing  [] Odor Control  [] Palliative Care  [] Pain Control   [] Other:         PAST MEDICAL HISTORY        Diagnosis Date    Cerebral artery occlusion with cerebral infarction (HCC)     Hyperlipidemia     Hypertension     Seizures (HCC)        PAST SURGICAL HISTORY    Past Surgical History:   Procedure Laterality Date    BACK SURGERY      lumbar    EP DEVICE PROCEDURE Left 2024    Insert PPM dual performed by Desiree Mai MD at [facility] CARDIAC CATH LAB    EYE SURGERY Right 2013    cataract extraction with IOL implant    EYE SURGERY Left 13    phaco with IOL       FAMILY HISTORY    History reviewed. No pertinent family history.    SOCIAL HISTORY    Social History     Tobacco Use    Smoking status: Never    Smokeless tobacco: Never   Substance Use Topics    Alcohol use: No       ALLERGIES    No Known Allergies    MEDICATIONS    No current facility-administered medications on file prior to encounter.     Current Outpatient Medications on File Prior to Encounter   Medication Sig Dispense Refill    escitalopram (LEXAPRO) 10 MG tablet Take 1 tablet by mouth daily      meclizine (ANTIVERT) 12.5 MG tablet Take 1 tablet by mouth 1 tab every 12 hours PRN      carvedilol (COREG) 3.125 MG tablet Take 1 tablet by mouth 2 times daily (with meals) 60 tablet 0

## 2025-01-31 NOTE — FLOWSHEET NOTE
01/30/25 2034   Vital Signs   Temp 97.9 °F (36.6 °C)   Temp Source Oral   Pulse 60   Heart Rate Source Monitor   Respirations 14   BP (!) 151/63   MAP (Calculated) 92   BP Location Right upper arm   BP Method Automatic   Patient Position High fowlers   Pain Assessment   Pain Assessment None - Denies Pain   Oxygen Therapy   SpO2 96 %   O2 Device None (Room air)     PM assessment complete. Medications given as ordered. Vital signs stable. Plan of care reviewed. Bed alarm set. Fall precautions were explained to patient. Pt is alert to self and place, sometimes situation. Water at bedside. No other needs identified at this time.

## 2025-01-31 NOTE — PLAN OF CARE
Problem: Chronic Conditions and Co-morbidities  Goal: Patient's chronic conditions and co-morbidity symptoms are monitored and maintained or improved  Outcome: Progressing     Problem: Discharge Planning  Goal: Discharge to home or other facility with appropriate resources  Outcome: Progressing     Problem: Safety - Adult  Goal: Free from fall injury  Outcome: Progressing     Problem: Skin/Tissue Integrity  Goal: Skin integrity remains intact  Description: 1.  Monitor for areas of redness and/or skin breakdown  2.  Assess vascular access sites hourly  3.  Every 4-6 hours minimum:  Change oxygen saturation probe site  4.  Every 4-6 hours:  If on nasal continuous positive airway pressure, respiratory therapy assess nares and determine need for appliance change or resting period  Outcome: Progressing     Problem: ABCDS Injury Assessment  Goal: Absence of physical injury  Outcome: Progressing

## 2025-01-31 NOTE — PROGRESS NOTES
0807 - Secure message sent to attending provider regarding need for pacemaker interrogation.       0808 - Per MD, \"reach out to Grady with Cardiology to see how to arrange interrogation\"      0812 - Per Grady, she will reach out to [employer] to see about having pacemaker interrogated.     Electronically signed by Kristen Bass RN on 1/31/2025 at 8:13 AM

## 2025-01-31 NOTE — CARE COORDINATION
Case Management Assessment  Initial Evaluation    Date/Time of Evaluation: 1/31/2025 1:09 PM  Assessment Completed by: Kat Bailey RN    If patient is discharged prior to next notation, then this note serves as note for discharge by case management.    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 Yes    Chart Reviewed: Yes      History Provided by: Medical Record, Significant Other  Patient Orientation: Unable to Assess (Pt sleeping and cinfused)    Patient Cognition: Dementia / Early Alzheimer's    Hospitalization in the last 30 days (Readmission):  No    If yes, Readmission Assessment in  Navigator will be completed.    Advance Directives:      Code Status: Full Code   Patient's Primary Decision Maker is: Legal Next of Kin      Discharge Planning:    Patient lives with: Spouse/Significant Other, Family Members, Children Type of Home: House  Primary Care Giver: Family  Patient Support Systems include: Spouse/Significant Other, Family Members, Children   Current Financial resources: Medicare  Current community resources: None  Current services prior to admission: None            Current DME:              Type of Home Care services:  Housekeeping    ADLS  Prior functional level: Assistance with the following:, Cooking, Housework, Shopping  Current functional level: Assistance with the following:, Cooking, Housework, Shopping, Mobility    PT AM-PAC: 15 /24  OT AM-PAC:   /24    Family can provide assistance at DC: Yes  Would you like Case Management to discuss the discharge plan with any other family members/significant others, and if so, who? Yes (Spouse)  Plans to Return to

## 2025-01-31 NOTE — PROGRESS NOTES
Occupational Therapy  Facility/Department: [place] [address] - MED SURG/ORTHO  Occupational Therapy Initial Assessment    Name: Akbar Arias  : 1935  MRN: [id number]  Date of Service: 2025    Discharge Recommendations:  Subacute/Skilled Nursing Facility  Therapy discharge recommendations take into account each patient's current medical complexities and are subject to input/oversight from the patient's healthcare team.   Barriers to Home Discharge:   [x] Steps to access home entry or bed/bath:   [x] Unable to transfer, ambulate, or propel wheelchair household distances without assist     [x] Patient or family requests d/c to post-acute facility       [x] Patient is at risk for falls due to:orthostatic postural hypotension      If pt is unable to be seen after this session, please let this note serve as discharge summary.  Please see case management note for discharge disposition.  Thank you.           Patient Diagnosis(es): The primary encounter diagnosis was Orthostatic hypotension. 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  Past Medical History:  has a past medical history of Cerebral artery occlusion with cerebral infarction (HCC), Hyperlipidemia, Hypertension, and Seizures (HCC).  Past Surgical History:  has a past surgical history that includes back surgery (); eye surgery (Right, 2013); eye surgery (Left, 13); and ep device procedure (Left, 2024).           Assessment  Performance deficits / Impairments: Decreased functional mobility ;Decreased endurance;Decreased ADL status;Decreased coordination;Decreased balance;Decreased strength;Decreased safe awareness  Assessment: pt from home with spouse, normally needs assist with all ADL's d/t decreased coordination Right UE, independent without AD; admitted for dizziness,falls with inabilty to ambulate; pt now requiring max assist with LE self care, min assist with sit<-->stand transfers with symptomatic postural

## 2025-01-31 NOTE — PROGRESS NOTES
Physical Therapy  Facility/Department: [place] [address] - MED SURG/ORTHO  Physical Therapy Initial Assessment/Treatment    Name: Akbar Arias  : 1935  MRN: [id number]  Date of Service: 2025    Discharge Recommendations:  Subacute/Skilled Nursing Facility   PT Equipment Recommendations  Equipment Needed: No    Therapy discharge recommendations are subject to collaboration from the patient’s interdisciplinary healthcare team, including MD and case management recommendations.    Barriers to Home Discharge:   [x] Steps to access home entry or bed/bath:   [x] Unable to transfer, ambulate, or propel wheelchair household distances without assist   [x] Limited available assist at home upon discharge    [] Patient or family requests d/c to post-acute facility    [x] Poor cognition/safety awareness for d/c to home alone    [] Unable to maintain ordered weight bearing status    [] Patient with salient signs of long-standing immobility   [x] Decreased independence with ADLs   [x] Increased risk for falls   [] Other:    If pt is unable to be seen after this session, please let this note serve as discharge summary.  Please see case management note for discharge disposition.  Thank you.     Patient Diagnosis(es): The primary encounter diagnosis was Orthostatic hypotension. 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  Past Medical History:  has a past medical history of Cerebral artery occlusion with cerebral infarction (HCC), Hyperlipidemia, Hypertension, and Seizures (HCC).  Past Surgical History:  has a past surgical history that includes back surgery (); eye surgery (Right, 2013); eye surgery (Left, 13); and ep device procedure (Left, 2024).    Assessment  Body Structures, Functions, Activity Limitations Requiring Skilled Therapeutic Intervention: Decreased functional mobility ;Decreased strength;Decreased safe awareness;Decreased endurance;Decreased balance  Assessment: Pt is an

## 2025-01-31 NOTE — PROGRESS NOTES
4 Eyes Skin Assessment     NAME:  Akbar Arias  YOB: 1935  MEDICAL RECORD NUMBER:  [id number]    The patient is being assessed for  Admission    I agree that at least one RN has performed a thorough Head to Toe Skin Assessment on the patient. ALL assessment sites listed below have been assessed.      Reddended blanchable coccyx, with open area    Areas assessed by both nurses:    Head, Face, Ears, Shoulders, Back, Chest, Arms, Elbows, Hands, Sacrum. Buttock, Coccyx, Ischium, and Legs. Feet and Heels        Does the Patient have a Wound? Yes wound(s) were present on assessment. LDA wound assessment was Initiated and completed by RN       Trent Prevention initiated by RN: Yes  Wound Care Orders initiated by RN: Yes    Pressure Injury (Stage 3,4, Unstageable, DTI, NWPT, and Complex wounds) if present, place Wound referral order by RN under : Yes, stage 2    New Ostomies, if present place, Ostomy referral order under : No     Nurse 1 eSignature: Electronically signed by Beryl Oliveira RN on 1/30/25 at 10:34 PM EST    **SHARE this note so that the co-signing nurse can place an eSignature**    Nurse 2 eSignature: Electronically signed by Darling Porter RN on 1/31/25 at 3:14 AM EST

## 2025-02-01 LAB
CORTIS AM PEAK SERPL-MCNC: 9.4 UG/DL (ref 4.3–22.4)
GLUCOSE BLD-MCNC: 131 MG/DL (ref 70–99)
GLUCOSE BLD-MCNC: 133 MG/DL (ref 70–99)
GLUCOSE BLD-MCNC: 158 MG/DL (ref 70–99)
GLUCOSE BLD-MCNC: 98 MG/DL (ref 70–99)
PERFORMED ON: ABNORMAL
PERFORMED ON: NORMAL
T4 FREE SERPL-MCNC: 1.5 NG/DL (ref 0.9–1.8)
TSH SERPL DL<=0.005 MIU/L-ACNC: 1.08 UIU/ML (ref 0.27–4.2)

## 2025-02-01 PROCEDURE — 6370000000 HC RX 637 (ALT 250 FOR IP): Performed by: INTERNAL MEDICINE

## 2025-02-01 PROCEDURE — G0378 HOSPITAL OBSERVATION PER HR: HCPCS

## 2025-02-01 PROCEDURE — 6360000002 HC RX W HCPCS: Performed by: INTERNAL MEDICINE

## 2025-02-01 PROCEDURE — 1200000000 HC SEMI PRIVATE

## 2025-02-01 PROCEDURE — 2500000003 HC RX 250 WO HCPCS: Performed by: INTERNAL MEDICINE

## 2025-02-01 RX ADMIN — ASPIRIN 325 MG: 325 TABLET, COATED ORAL at 10:50

## 2025-02-01 RX ADMIN — CARVEDILOL 3.12 MG: 3.12 TABLET, FILM COATED ORAL at 18:25

## 2025-02-01 RX ADMIN — CARVEDILOL 3.12 MG: 3.12 TABLET, FILM COATED ORAL at 10:59

## 2025-02-01 RX ADMIN — SENNOSIDES AND DOCUSATE SODIUM 1 TABLET: 50; 8.6 TABLET ORAL at 20:47

## 2025-02-01 RX ADMIN — CLOPIDOGREL BISULFATE 75 MG: 75 TABLET ORAL at 10:50

## 2025-02-01 RX ADMIN — SODIUM CHLORIDE, PRESERVATIVE FREE 10 ML: 5 INJECTION INTRAVENOUS at 20:47

## 2025-02-01 RX ADMIN — ATORVASTATIN CALCIUM 40 MG: 40 TABLET, FILM COATED ORAL at 20:47

## 2025-02-01 RX ADMIN — SENNOSIDES AND DOCUSATE SODIUM 1 TABLET: 50; 8.6 TABLET ORAL at 10:50

## 2025-02-01 RX ADMIN — ESCITALOPRAM OXALATE 5 MG: 10 TABLET ORAL at 10:50

## 2025-02-01 RX ADMIN — ENOXAPARIN SODIUM 40 MG: 100 INJECTION SUBCUTANEOUS at 10:50

## 2025-02-01 RX ADMIN — SODIUM CHLORIDE, PRESERVATIVE FREE 10 ML: 5 INJECTION INTRAVENOUS at 10:59

## 2025-02-01 ASSESSMENT — PAIN SCALES - GENERAL
PAINLEVEL_OUTOF10: 0

## 2025-02-01 NOTE — PROGRESS NOTES
[facility] Medicine Progress Note  V 1.6      Date of Admission: 1/30/2025    Hospital Day: 3      Chief Admission Complaint:  weakness     Subjective:  feels well, not keen on going to rehab as of today, wife not currently at bedside     Presenting Admission History:          89 y.o. 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 Mai) rwho presented to [facility] with weakness. Pt was noted to have some mild weakness yesterday(per wife) but not significant. Today, he tried to get up out of bed in the morning and his legs gave out under him. He was not able to lift himself back up, so his grandson had to come help him up. Pt was brought in by EMS.  He denies any palpitations.    -Pt is aware of ongoing orthostatic hypotension and states his PCP is making changes to home meds of late(norvasc is now 2.5mg daily, hydralazine has been stopped, pt started on lexapro)  -of note, pt was recently diagnosed with UTI in the past 1-2 weeks(started taking Cefdinir on 1/22/25 and completed 7 day course).  He notes losing weight as well as poor fluid intake per wife. He notes lack of taste in food in general since his stroke(pt went to ).  -he is also on a new med for dizziness, (meclizine)  No prior of fall     Assessment/Plan:       Current Principal Problem:  Weakness       Generalized weakness- posssibly multifactorial(bp med, ?autonomic dysfcn causing orthostatic hypotension, weight loss and FTT)  -Pt/ot eval ordered  -tele  -Pending pacer interrogation, ordered in ER and pending  -repeat am orthostatics still were positive on 1/31  -Held bp meds except for coreg  -started jocelin hose  -Checked  AM cortisol/TFTs     HTN- stable but drops with standing  -continued Coreg  -stopped norvasc(pt on 2.5mg)  -Pt no longer taking hydralazine  -Held arb     HLD-statin     Dm2- per emr  Held metformin  Ac/hs bs  ssi     Prior CVA- no issues per pt  Asa/plavix/statin    Strip) personally reviewed and interpreted        [] Imaging personally reviewed and interpreted     [x] Discussion (any 1)  [x] Multi-Disciplinary Rounds with Case Management  [] Discussed management of the case with           Labs:  Personally reviewed on 2/1/2025 and interpreted for clinical significance as documented above.     Recent Labs     01/30/25  1024 01/31/25  0552   WBC 8.0 7.3   HGB 11.2* 10.6*   HCT 32.4* 30.3*    273     Recent Labs     01/30/25  1024 01/31/25  0552   * 130*   K 3.9 3.7   CL 98* 98*   CO2 25 26   BUN 17 18   CREATININE 1.5* 1.3   CALCIUM 9.5 9.4   MG 1.89  --      Recent Labs     01/30/25  1024   PROBNP 1,263*   TROPHS 33*     No results for input(s): \"LABA1C\" in the last 72 hours.  Recent Labs     01/30/25  1024   AST 21   ALT 19   BILITOT 0.4   ALKPHOS 76     No results for input(s): \"INR\", \"LACTA\", \"TSH\" in the last 72 hours.    Urine Cultures: No results found for: \"LABURIN\"  Blood Cultures: No results found for: \"BC\"  No results found for: \"BLOODCULT2\"  Organism: No results found for: \"ORG\"      Miguel Angel Rose MD

## 2025-02-01 NOTE — FLOWSHEET NOTE
02/01/25 1548   Vital Signs   Blood Pressure Lying 149/63   Pulse Lying 59 PER MINUTE   Blood Pressure Sitting 121/69   Pulse Sitting 58 PER MINUTE   Blood Pressure Standing 101/63

## 2025-02-01 NOTE — PROGRESS NOTES
Comprehensive Nutrition Assessment    Type and Reason for Visit:  Initial, Positive nutrition screen (MST=2: weight loss)    Nutrition Recommendations/Plan:   Continue general diet - monitor BG and need for carb control diet   Add Gatorade daily and Magic Cups BID   Encourage PO intakes as tolerated   Monitor nutrition adequacy, pertinent labs, bowel habits, wt changes, and clinical progress     Malnutrition Assessment:  Malnutrition Status:  At risk for malnutrition (02/01/25 1209)    Context:  Acute Illness     Findings of the 6 clinical characteristics of malnutrition:  Energy Intake:  75% or less of estimated energy requirements for 7 or more days  Weight Loss:  Mild weight loss (4.6% weight loss in 3 months.)     Body Fat Loss:  Unable to assess     Muscle Mass Loss:  Unable to assess    Fluid Accumulation:  Unable to assess     Strength:  Not Performed    Nutrition Assessment:    Pt admitted with weakness. Hx of CVA, HTN, HLD, and DM. Pt nutritionally compromised AEB poor PO intakes and weight loss. Pt reports since his stroke in October 2024, foods do not have a taste. Pt reports difficulty eating when nothing tastes different. Family reports 2 meals daily at home, pt aware he has been eating less. Discussed importance of adequate PO intakes, pt verbalized understanding. Agreeable to adding ONS to promote PO intakes. No further nutrition questions or concerns. Will continue to monitor.    Nutrition Related Findings:    4.6% weight loss in 3 months. BM on 1/29. Na 130. BG stable. Wound Type: Pressure Injury, Stage II       Current Nutrition Intake & Therapies:    Average Meal Intake: 26-50%  Average Supplements Intake: None Ordered  ADULT DIET; Regular  ADULT ORAL NUTRITION SUPPLEMENT; Breakfast; Other Oral Supplement; Gatorade  ADULT ORAL NUTRITION SUPPLEMENT; Lunch, Dinner; Frozen Oral Supplement    Anthropometric Measures:  Height: 185.4 cm (6' 1\")  Ideal Body Weight (IBW): 184 lbs (84 kg)       Current

## 2025-02-02 LAB
GLUCOSE BLD-MCNC: 111 MG/DL (ref 70–99)
GLUCOSE BLD-MCNC: 117 MG/DL (ref 70–99)
GLUCOSE BLD-MCNC: 124 MG/DL (ref 70–99)
GLUCOSE BLD-MCNC: 159 MG/DL (ref 70–99)
PERFORMED ON: ABNORMAL

## 2025-02-02 PROCEDURE — 51798 US URINE CAPACITY MEASURE: CPT

## 2025-02-02 PROCEDURE — 6370000000 HC RX 637 (ALT 250 FOR IP): Performed by: INTERNAL MEDICINE

## 2025-02-02 PROCEDURE — 1200000000 HC SEMI PRIVATE

## 2025-02-02 PROCEDURE — 6360000002 HC RX W HCPCS: Performed by: INTERNAL MEDICINE

## 2025-02-02 PROCEDURE — 2500000003 HC RX 250 WO HCPCS: Performed by: INTERNAL MEDICINE

## 2025-02-02 RX ADMIN — CLOPIDOGREL BISULFATE 75 MG: 75 TABLET ORAL at 10:00

## 2025-02-02 RX ADMIN — SODIUM CHLORIDE, PRESERVATIVE FREE 10 ML: 5 INJECTION INTRAVENOUS at 10:03

## 2025-02-02 RX ADMIN — SENNOSIDES AND DOCUSATE SODIUM 1 TABLET: 50; 8.6 TABLET ORAL at 20:31

## 2025-02-02 RX ADMIN — ENOXAPARIN SODIUM 40 MG: 100 INJECTION SUBCUTANEOUS at 10:03

## 2025-02-02 RX ADMIN — ESCITALOPRAM OXALATE 5 MG: 10 TABLET ORAL at 10:00

## 2025-02-02 RX ADMIN — SODIUM CHLORIDE, PRESERVATIVE FREE 10 ML: 5 INJECTION INTRAVENOUS at 20:31

## 2025-02-02 RX ADMIN — ATORVASTATIN CALCIUM 40 MG: 40 TABLET, FILM COATED ORAL at 20:31

## 2025-02-02 RX ADMIN — ASPIRIN 325 MG: 325 TABLET, COATED ORAL at 10:00

## 2025-02-02 RX ADMIN — SENNOSIDES AND DOCUSATE SODIUM 1 TABLET: 50; 8.6 TABLET ORAL at 10:00

## 2025-02-02 ASSESSMENT — PAIN SCALES - GENERAL
PAINLEVEL_OUTOF10: 0
PAINLEVEL_OUTOF10: 0

## 2025-02-02 NOTE — CARE COORDINATION
Per MD spouse asking for referral to [facility]. Referral placed.     Addendum: Spoke to spouse re SNF recs. At this time she declines to provide second choice SNF, will accept SNF list. Faxed SNF list to unit for review.

## 2025-02-02 NOTE — PLAN OF CARE
Problem: Chronic Conditions and Co-morbidities  Goal: Patient's chronic conditions and co-morbidity symptoms are monitored and maintained or improved  Outcome: Progressing  Flowsheets (Taken 2/2/2025 1043)  Care Plan - Patient's Chronic Conditions and Co-Morbidity Symptoms are Monitored and Maintained or Improved:   Monitor and assess patient's chronic conditions and comorbid symptoms for stability, deterioration, or improvement   Collaborate with multidisciplinary team to address chronic and comorbid conditions and prevent exacerbation or deterioration     Problem: Discharge Planning  Goal: Discharge to home or other facility with appropriate resources  Outcome: Progressing     Problem: Safety - Adult  Goal: Free from fall injury  Outcome: Progressing  Note: Bed in lowest position, wheels locked, 2/4 side rails up, nonskid footwear on. Bed/ chair check alarm in place, call light within reach. Pt instructed to call out when needing assistance. Pt stated understanding.     Problem: Skin/Tissue Integrity  Goal: Skin integrity remains intact  Description: 1.  Monitor for areas of redness and/or skin breakdown  2.  Assess vascular access sites hourly  3.  Every 4-6 hours minimum:  Change oxygen saturation probe site  4.  Every 4-6 hours:  If on nasal continuous positive airway pressure, respiratory therapy assess nares and determine need for appliance change or resting period  Outcome: Progressing     Problem: ABCDS Injury Assessment  Goal: Absence of physical injury  Outcome: Progressing     Problem: Pain  Goal: Verbalizes/displays adequate comfort level or baseline comfort level  Outcome: Progressing  Flowsheets (Taken 2/2/2025 1043)  Verbalizes/displays adequate comfort level or baseline comfort level: Encourage patient to monitor pain and request assistance  Note: Pt scoring pain on 0-10 scale. Pain medications given per MAR. Pt instructed to call out when pain level increasing. Call light within reach.

## 2025-02-02 NOTE — PROGRESS NOTES
Hospital Medicine Progress Note  V 1.6      Date of Admission: 1/30/2025    Hospital Day: 4        Chief Admission Complaint:  weakness     Subjective:  feels well, resting in bed,  wife currently at bedside and made aware of snf recs     Presenting Admission History:          89 y.o. male with hx of cva, htn, hld, dm2 , pacer(for bradycardia, placed 12/2024 at [facility], Dr. Mai) rwho presented to [facility] with weakness. Pt was noted to have some mild weakness yesterday(per wife) but not significant. Today, he tried to get up out of bed in the morning and his legs gave out under him. He was not able to lift himself back up, so his grandson had to come help him up. Pt was brought in by EMS.  He denies any palpitations.    -Pt is aware of ongoing orthostatic hypotension and states his PCP is making changes to home meds of late(norvasc is now 2.5mg daily, hydralazine has been stopped, pt started on lexapro)  -of note, pt was recently diagnosed with UTI in the past 1-2 weeks(started taking Cefdinir on 1/22/25 and completed 7 day course).  He notes losing weight as well as poor fluid intake per wife. He notes lack of taste in food in general since his stroke(pt went to ).  -he is also on a new med for dizziness, (meclizine)  No prior of fall     Assessment/Plan:       Current Principal Problem:  Weakness       Generalized weakness- posssibly multifactorial(bp med, ?autonomic dysfcn causing orthostatic hypotension, weight loss and FTT)  -Pt/ot eval ordered  -tele  -Pending pacer interrogation, ordered in ER and pending  -repeat am orthostatics still were positive on 1/31  -Held bp meds except for coreg  -started jocelin hose  -Checked  AM cortisol/TFTs and wnl     HTN- stable but drops with standing  -continued Coreg, parameters added on 2/2  -stopped norvasc(pt on 2.5mg)  -Pt no longer taking hydralazine  -Held arb     HLD-statin     Dm2- per emr  Held metformin  Ac/hs bs  ssi     Prior CVA- no issues

## 2025-02-03 ENCOUNTER — TELEPHONE (OUTPATIENT)
Dept: CASE MANAGEMENT | Age: 89
End: 2025-02-03

## 2025-02-03 LAB
GLUCOSE BLD-MCNC: 115 MG/DL (ref 70–99)
GLUCOSE BLD-MCNC: 121 MG/DL (ref 70–99)
GLUCOSE BLD-MCNC: 140 MG/DL (ref 70–99)
GLUCOSE BLD-MCNC: 146 MG/DL (ref 70–99)
PERFORMED ON: ABNORMAL

## 2025-02-03 PROCEDURE — 97530 THERAPEUTIC ACTIVITIES: CPT

## 2025-02-03 PROCEDURE — 97535 SELF CARE MNGMENT TRAINING: CPT

## 2025-02-03 PROCEDURE — 6360000002 HC RX W HCPCS: Performed by: INTERNAL MEDICINE

## 2025-02-03 PROCEDURE — 97110 THERAPEUTIC EXERCISES: CPT

## 2025-02-03 PROCEDURE — 6370000000 HC RX 637 (ALT 250 FOR IP): Performed by: INTERNAL MEDICINE

## 2025-02-03 PROCEDURE — 1200000000 HC SEMI PRIVATE

## 2025-02-03 PROCEDURE — 2500000003 HC RX 250 WO HCPCS: Performed by: INTERNAL MEDICINE

## 2025-02-03 RX ADMIN — CLOPIDOGREL BISULFATE 75 MG: 75 TABLET ORAL at 09:39

## 2025-02-03 RX ADMIN — SENNOSIDES AND DOCUSATE SODIUM 1 TABLET: 50; 8.6 TABLET ORAL at 09:39

## 2025-02-03 RX ADMIN — ATORVASTATIN CALCIUM 40 MG: 40 TABLET, FILM COATED ORAL at 21:03

## 2025-02-03 RX ADMIN — ASPIRIN 325 MG: 325 TABLET, COATED ORAL at 09:39

## 2025-02-03 RX ADMIN — SODIUM CHLORIDE, PRESERVATIVE FREE 10 ML: 5 INJECTION INTRAVENOUS at 21:03

## 2025-02-03 RX ADMIN — ENOXAPARIN SODIUM 40 MG: 100 INJECTION SUBCUTANEOUS at 09:39

## 2025-02-03 RX ADMIN — SENNOSIDES AND DOCUSATE SODIUM 1 TABLET: 50; 8.6 TABLET ORAL at 21:03

## 2025-02-03 RX ADMIN — SODIUM CHLORIDE, PRESERVATIVE FREE 10 ML: 5 INJECTION INTRAVENOUS at 09:40

## 2025-02-03 RX ADMIN — ESCITALOPRAM OXALATE 5 MG: 10 TABLET ORAL at 09:39

## 2025-02-03 NOTE — CARE COORDINATION
ADDENDUM  Writer spoke with spouse and she would like to take patient home with [facility], writer ALISSA for [facility] @ [facility] [employer] for SN/Aide/PT/OT    Writer spoke with Dayne in ARU, they have no beds right now until Sunday, he is second on the list, she will update me ASAP.     Kat Bailey RN

## 2025-02-03 NOTE — PROGRESS NOTES
Physical Therapy  Facility/Department: [facility] C5 - MED SURG/ORTHO  Daily Treatment Note  NAME: Akbar Arias  : 1935  MRN: [id number]    Date of Service: 2/3/2025    Discharge Recommendations:  Subacute/Skilled Nursing Facility   PT Equipment Recommendations  Equipment Needed: No    Patient Diagnosis(es): The primary encounter diagnosis was Orthostatic hypotension. 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    Assessment  Assessment: Pt seen as PT/OT co treat based on previous session and nursing recommendations in lieu of orthostatic BPs.  Pt again demos orthostatics with this session.  Pt required CGA supine to sit having an initial posterior LOB. He stood CGA/min A of 1 to walker and transferred to the chair with same.  Pt was unable to amb due to standing BPs 92/71 HR 70 and 81/51 HR 79, pt asymptomatic. Pt is recommended for con't skilled PT and SNF at D/C however as BP issues are resolved pt may be close to baseline and able to return home.     Activity Tolerance: Patient limited by endurance;Other (comment)  Equipment Needed: No    Plan  Physical Therapy Plan  General Plan: 3-5 times per week  Current Treatment Recommendations: Strengthening;Balance training;Functional mobility training;Transfer training;Endurance training;Gait training;Stair training;Home exercise program;Safety education & training;Therapeutic activities    Restrictions  Restrictions/Precautions  Restrictions/Precautions: Fall Risk  Activity Level: Up with Assist  Position Activity Restriction  Other Position/Activity Restrictions: telemetry     Subjective   Subjective  Subjective: Pt agreeble  Pain: agreeable    Objective  Vitals  Vitals:    25    BP: (!) 159/70   Pulse: 60   Resp:    Temp:    SpO2: 95%     Orthostatic Vitals:      2/3/2025    11:22 AM   Orthostatic Vitals   Blood Pressure Lying 159/70   Pulse Lying 60 PER MINUTE   Blood Pressure Sitting 119/73   Pulse Sitting 59 PER MINUTE   Blood  needed    AM-PAC - Mobility    AM-PAC Basic Mobility - Inpatient   How much help is needed turning from your back to your side while in a flat bed without using bedrails?: A Little  How much help is needed moving from lying on your back to sitting on the side of a flat bed without using bedrails?: A Little  How much help is needed moving to and from a bed to a chair?: A Little  How much help is needed standing up from a chair using your arms?: A Little  How much help is needed walking in hospital room?: A Little  How much help is needed climbing 3-5 steps with a railing?: A Lot  AM-PAC Inpatient Mobility Raw Score : 17  AM-PAC Inpatient T-Scale Score : 42.13  Mobility Inpatient CMS 0-100% Score: 50.57  Mobility Inpatient CMS G-Code Modifier : CK         Therapy Time   Individual Concurrent Group Co-treatment   Time In       1115   Time Out       1153   Minutes       38           Alina Manuel, PTA #[id number]

## 2025-02-03 NOTE — PROGRESS NOTES
Occupational Therapy  Facility/Department: [facility] C5 - MED SURG/ORTHO  Daily Treatment Note  NAME: Akbar Arias  : 1935  MRN: [id number]    Date of Service: 2/3/2025    Discharge Recommendations:  Subacute/Skilled Nursing Facility  OT Equipment Recommendations  Equipment Needed: No    Therapy discharge recommendations are subject to collaboration from the patient’s interdisciplinary healthcare team, including MD and case management recommendations.    Barriers to Home Discharge:   [x] Steps to access home entry or bed/bath:   [x] Unable to transfer, ambulate, or propel wheelchair household distances without assist   [] Limited available assist at home upon discharge    [x] Patient or family requests d/c to post-acute facility    [] Poor cognition/safety awareness for d/c to home alone    [] Unable to maintain ordered weight bearing status    [] Patient with salient signs of long-standing immobility   [x] Decreased independence with ADLs   [x] Increased risk for falls   [] Other:    If pt is unable to be seen after this session, please let this note serve as discharge summary.  Please see case management note for discharge disposition.  Thank you.      Patient Diagnosis(es): The primary encounter diagnosis was Orthostatic hypotension. 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     Assessment   Assessment: Pt seen for follow up OT/PT session this date. Co-treatment utilized to maximize safety and utilize the skill of two skilled therapists.  Pt completed CGA for bed mobility, CGA-Min A with RW to stand and transfer to chair. Pt did exhibit orthostatic hypotension during session (See vitals below). Able to improve BP with LE/UE exer. Pt completed ADLs while seated in chair including grooming hair with set up, and LB dressing with assistance to thread brief over R foot (pt reported this is baseline and wife typically assists with LB dressing). Pt would continue to benefit from acute OT at

## 2025-02-03 NOTE — PLAN OF CARE
Problem: Chronic Conditions and Co-morbidities  Goal: Patient's chronic conditions and co-morbidity symptoms are monitored and maintained or improved  2/2/2025 2151 by Gabrielle Thomas RN  Outcome: Progressing  2/2/2025 1043 by Smita Magallanes RN  Outcome: Progressing  Flowsheets (Taken 2/2/2025 1043)  Care Plan - Patient's Chronic Conditions and Co-Morbidity Symptoms are Monitored and Maintained or Improved:   Monitor and assess patient's chronic conditions and comorbid symptoms for stability, deterioration, or improvement   Collaborate with multidisciplinary team to address chronic and comorbid conditions and prevent exacerbation or deterioration     Problem: Discharge Planning  Goal: Discharge to home or other facility with appropriate resources  2/2/2025 2151 by Gabrielle Thomas RN  Outcome: Progressing  2/2/2025 1043 by Smita Magallanes RN  Outcome: Progressing     Problem: Safety - Adult  Goal: Free from fall injury  2/2/2025 2151 by Gabrielle Thomas RN  Outcome: Progressing  2/2/2025 1043 by Smita Magallanes RN  Outcome: Progressing  Note: Bed in lowest position, wheels locked, 2/4 side rails up, nonskid footwear on. Bed/ chair check alarm in place, call light within reach. Pt instructed to call out when needing assistance. Pt stated understanding.     Problem: Skin/Tissue Integrity  Goal: Skin integrity remains intact  Description: 1.  Monitor for areas of redness and/or skin breakdown  2.  Assess vascular access sites hourly  3.  Every 4-6 hours minimum:  Change oxygen saturation probe site  4.  Every 4-6 hours:  If on nasal continuous positive airway pressure, respiratory therapy assess nares and determine need for appliance change or resting period  2/2/2025 2151 by Gabrielle Thomas RN  Outcome: Progressing  Flowsheets (Taken 2/2/2025 1045 by Smita Magallanes RN)  Skin Integrity Remains Intact: Monitor for areas of redness and/or skin breakdown  2/2/2025 1043 by Smita Magallanes RN  Outcome: Progressing     Problem:

## 2025-02-03 NOTE — TELEPHONE ENCOUNTER
Imaging report CT Chest 1/22/25 with f/u imaging recommendations sent to Damian CAMPOS(R)  Patient Navigator  Incidentals/Lung Navigation  [email or web address]

## 2025-02-03 NOTE — PROGRESS NOTES
Hospital Medicine Progress Note  V 1.6      Date of Admission: 1/30/2025    Hospital Day: 5        Chief Admission Complaint:  weakness     Subjective:  feels well, resting in bed,  wife currently at bedside and made aware of snf recs and pending request for ARU     Presenting Admission History:          89 y.o. male with hx of cva, htn, hld, dm2 , pacer(for bradycardia, placed 12/2024 at [facility], Dr. Mai) rwho presented to [facility] with weakness. Pt was noted to have some mild weakness yesterday(per wife) but not significant. Today, he tried to get up out of bed in the morning and his legs gave out under him. He was not able to lift himself back up, so his grandson had to come help him up. Pt was brought in by EMS.  He denies any palpitations.    -Pt is aware of ongoing orthostatic hypotension and states his PCP is making changes to home meds of late(norvasc is now 2.5mg daily, hydralazine has been stopped, pt started on lexapro)  -of note, pt was recently diagnosed with UTI in the past 1-2 weeks(started taking Cefdinir on 1/22/25 and completed 7 day course).  He notes losing weight as well as poor fluid intake per wife. He notes lack of taste in food in general since his stroke(pt went to ).  -he is also on a new med for dizziness, (meclizine)  No prior of fall     Assessment/Plan:       Current Principal Problem:  Weakness     Generalized weakness- posssibly multifactorial(bp med, ?autonomic dysfcn causing orthostatic hypotension, weight loss and FTT)  -Pt/ot eval ordered  -tele  -Pending pacer interrogation, ordered in ER and pending  -repeat am orthostatics still were positive on 1/31  -Held bp meds except for coreg  -started jocelin hose  -Checked  AM cortisol/TFTs and wnl     HTN- stable but drops with standing, may need to tolerate higher pressures to allow reasonable drop and prevent symptoms  -continued Coreg, parameters added on 2/2/25  -stopped norvasc(pt on 2.5mg)  -Pt no longer  for clinical significance    [x] Appropriate follow-up labs were ordered  [] Collateral history obtained     [x] Independent Interpretation of tests (any 1)    [x] Telemetry (Rhythm Strip) personally reviewed and interpreted        [] Imaging personally reviewed and interpreted     [x] Discussion (any 1)  [x] Multi-Disciplinary Rounds with Case Management  [] Discussed management of the case with           Labs:  Personally reviewed on 2/3/2025 and interpreted for clinical significance as documented above.     No results for input(s): \"WBC\", \"HGB\", \"HCT\", \"PLT\" in the last 72 hours.  No results for input(s): \"NA\", \"K\", \"CL\", \"CO2\", \"BUN\", \"CREATININE\", \"CALCIUM\", \"MG\", \"PHOS\" in the last 72 hours.  No results for input(s): \"PROBNP\", \"TROPHS\" in the last 72 hours.  No results for input(s): \"LABA1C\" in the last 72 hours.  No results for input(s): \"AST\", \"ALT\", \"BILIDIR\", \"BILITOT\", \"ALKPHOS\" in the last 72 hours.  No results for input(s): \"INR\", \"LACTA\", \"TSH\" in the last 72 hours.    Urine Cultures: No results found for: \"LABURIN\"  Blood Cultures: No results found for: \"BC\"  No results found for: \"BLOODCULT2\"  Organism: No results found for: \"ORG\"      Miguel Angel Rose MD

## 2025-02-03 NOTE — CONSULTS
Akbar Dominguezany  2/3/2025  [id number]    Chief Complaint: Weakness    Subjective:   This is an 89-year-old male with a past medical history including:  Past Medical History:   Diagnosis Date    Cerebral artery occlusion with cerebral infarction (HCC)     Hyperlipidemia     Hypertension     Seizures (HCC)    She came into the hospital with weakness.  The patient has been on the rehab unit last year and states he did very well with intensive therapy.  The patient was so weak at home he was not able to lift himself up and he had trouble walking at home.  He does have ongoing orthostatic hypotension which he is seeing his primary care physician.  The patient and wife would like to come back to the acute rehab unit for functional mobility retraining before planned discharge home.      ROS: No CP, SOB, dyspnea    Objective:  Patient Vitals for the past 24 hrs:   BP Temp Temp src Pulse Resp SpO2   02/03/25 1122 (!) 159/70 -- -- 60 -- 95 %   02/03/25 1116 (!) 158/71 97.3 °F (36.3 °C) Oral 59 16 94 %   02/03/25 0742 138/81 97.5 °F (36.4 °C) -- 58 -- 93 %   02/03/25 0730 138/81 97.5 °F (36.4 °C) Oral 58 16 93 %   02/03/25 0344 (!) 140/60 97.3 °F (36.3 °C) Oral 59 16 95 %   02/02/25 2348 (!) 135/58 97.7 °F (36.5 °C) Oral 58 18 95 %   02/02/25 2026 (!) 167/71 97.3 °F (36.3 °C) Oral 53 18 94 %   02/02/25 1714 (!) 187/71 97.9 °F (36.6 °C) Oral (!) 43 16 95 %     Gen: No distress, pleasant.   HEENT: Normocephalic, atraumatic.   CV: No audible murmurs, well perfused extremities  Resp: No respiratory distress. No increased WOB  Abd: Soft, nontender nondistended  Ext: No edema. Weakness throughout  Neuro: Alert, oriented, appropriately interactive.     Laboratory data: Available via EMR.     Therapy progress:    PT    Rolling: Level of difficulty - A Little   Sit to Stand from a Chair: Level of difficulty - A Little  Supine to Sit: Level of difficulty - A Little     Bed to Chair: Physical Assistance Required - A Lot  Ambulate Across

## 2025-02-04 VITALS
TEMPERATURE: 97.2 F | HEART RATE: 60 BPM | DIASTOLIC BLOOD PRESSURE: 74 MMHG | BODY MASS INDEX: 24.52 KG/M2 | SYSTOLIC BLOOD PRESSURE: 150 MMHG | OXYGEN SATURATION: 95 % | WEIGHT: 185 LBS | HEIGHT: 73 IN | RESPIRATION RATE: 16 BRPM

## 2025-02-04 LAB
ALBUMIN SERPL-MCNC: 3.2 G/DL (ref 3.4–5)
ANION GAP SERPL CALCULATED.3IONS-SCNC: 5 MMOL/L (ref 3–16)
BASOPHILS # BLD: 0.1 K/UL (ref 0–0.2)
BASOPHILS NFR BLD: 1.2 %
BUN SERPL-MCNC: 20 MG/DL (ref 7–20)
CALCIUM SERPL-MCNC: 9.4 MG/DL (ref 8.3–10.6)
CHLORIDE SERPL-SCNC: 103 MMOL/L (ref 99–110)
CO2 SERPL-SCNC: 30 MMOL/L (ref 21–32)
CREAT SERPL-MCNC: 1.6 MG/DL (ref 0.8–1.3)
DEPRECATED RDW RBC AUTO: 13.4 % (ref 12.4–15.4)
EOSINOPHIL # BLD: 0.1 K/UL (ref 0–0.6)
EOSINOPHIL NFR BLD: 2.1 %
GFR SERPLBLD CREATININE-BSD FMLA CKD-EPI: 41 ML/MIN/{1.73_M2}
GLUCOSE BLD-MCNC: 116 MG/DL (ref 70–99)
GLUCOSE BLD-MCNC: 153 MG/DL (ref 70–99)
GLUCOSE SERPL-MCNC: 135 MG/DL (ref 70–99)
HCT VFR BLD AUTO: 28.5 % (ref 40.5–52.5)
HGB BLD-MCNC: 9.9 G/DL (ref 13.5–17.5)
LYMPHOCYTES # BLD: 0.9 K/UL (ref 1–5.1)
LYMPHOCYTES NFR BLD: 16.7 %
MAGNESIUM SERPL-MCNC: 2.13 MG/DL (ref 1.8–2.4)
MCH RBC QN AUTO: 32.9 PG (ref 26–34)
MCHC RBC AUTO-ENTMCNC: 34.6 G/DL (ref 31–36)
MCV RBC AUTO: 95.1 FL (ref 80–100)
MONOCYTES # BLD: 0.7 K/UL (ref 0–1.3)
MONOCYTES NFR BLD: 13 %
NEUTROPHILS # BLD: 3.6 K/UL (ref 1.7–7.7)
NEUTROPHILS NFR BLD: 67 %
PERFORMED ON: ABNORMAL
PERFORMED ON: ABNORMAL
PHOSPHATE SERPL-MCNC: 2.9 MG/DL (ref 2.5–4.9)
PLATELET # BLD AUTO: 263 K/UL (ref 135–450)
PMV BLD AUTO: 8.8 FL (ref 5–10.5)
POTASSIUM SERPL-SCNC: 4.3 MMOL/L (ref 3.5–5.1)
RBC # BLD AUTO: 2.99 M/UL (ref 4.2–5.9)
SODIUM SERPL-SCNC: 138 MMOL/L (ref 136–145)
WBC # BLD AUTO: 5.4 K/UL (ref 4–11)

## 2025-02-04 PROCEDURE — 2500000003 HC RX 250 WO HCPCS: Performed by: INTERNAL MEDICINE

## 2025-02-04 PROCEDURE — 80069 RENAL FUNCTION PANEL: CPT

## 2025-02-04 PROCEDURE — 83735 ASSAY OF MAGNESIUM: CPT

## 2025-02-04 PROCEDURE — 6360000002 HC RX W HCPCS: Performed by: INTERNAL MEDICINE

## 2025-02-04 PROCEDURE — 85025 COMPLETE CBC W/AUTO DIFF WBC: CPT

## 2025-02-04 PROCEDURE — 6370000000 HC RX 637 (ALT 250 FOR IP): Performed by: INTERNAL MEDICINE

## 2025-02-04 PROCEDURE — 36415 COLL VENOUS BLD VENIPUNCTURE: CPT

## 2025-02-04 RX ORDER — CARVEDILOL 3.12 MG/1
3.12 TABLET ORAL 2 TIMES DAILY WITH MEALS
Qty: 60 TABLET | Refills: 0
Start: 2025-02-04

## 2025-02-04 RX ORDER — AMLODIPINE BESYLATE 2.5 MG/1
2.5 TABLET ORAL DAILY
Qty: 10 TABLET | Refills: 0
Start: 2025-02-04

## 2025-02-04 RX ADMIN — ASPIRIN 325 MG: 325 TABLET, COATED ORAL at 08:37

## 2025-02-04 RX ADMIN — SODIUM CHLORIDE, PRESERVATIVE FREE 10 ML: 5 INJECTION INTRAVENOUS at 08:37

## 2025-02-04 RX ADMIN — ESCITALOPRAM OXALATE 5 MG: 10 TABLET ORAL at 08:37

## 2025-02-04 RX ADMIN — CLOPIDOGREL BISULFATE 75 MG: 75 TABLET ORAL at 08:38

## 2025-02-04 RX ADMIN — ENOXAPARIN SODIUM 40 MG: 100 INJECTION SUBCUTANEOUS at 08:37

## 2025-02-04 RX ADMIN — SENNOSIDES AND DOCUSATE SODIUM 1 TABLET: 50; 8.6 TABLET ORAL at 08:37

## 2025-02-04 NOTE — CARE COORDINATION
[facility] - Acute Rehab Unit   After review, this patient is felt to be:       []  Appropriate for Acute Inpatient Rehab    [x]  Appropriate for Acute Inpatient Rehab Pending Insurance Authorization    []  Not appropriate for Acute Inpatient Rehab    []  Referral received and ARU reviewing patient; Evaluation ongoing.      Limited bed availability.  Next available bed based of current outlook is Sunday 2/9.  Will notify CM if bed becomes available sooner.  Will require precert.    Will notify DCP with further updates. Thank you for the referral.      Dayne Simon MPH, RRT  ARU Admissions Supervisor-[facility]  (P)[phone]  (F)[phone]   Electronically signed by Dayne Simon on 2/4/2025 at 10:35 AM

## 2025-02-04 NOTE — PLAN OF CARE
Problem: Discharge Planning  Goal: Discharge to home or other facility with appropriate resources  2/4/2025 1157 by Wade Cortes RN  Outcome: Progressing     Problem: Safety - Adult  Goal: Free from fall injury  2/4/2025 1157 by Wade Cortes RN  Outcome: Progressing     Problem: Skin/Tissue Integrity  Goal: Skin integrity remains intact  Description: 1.  Monitor for areas of redness and/or skin breakdown  2.  Assess vascular access sites hourly  3.  Every 4-6 hours minimum:  Change oxygen saturation probe site  4.  Every 4-6 hours:  If on nasal continuous positive airway pressure, respiratory therapy assess nares and determine need for appliance change or resting period  2/4/2025 1157 by Wade Cortes RN  Outcome: Progressing     Problem: Pain  Goal: Verbalizes/displays adequate comfort level or baseline comfort level  2/4/2025 1157 by Wade Cortes, RN  Outcome: Progressing     Problem: Nutrition Deficit:  Goal: Optimize nutritional status  2/4/2025 1157 by Wade Cortes, RN  Outcome: Progressing

## 2025-02-04 NOTE — CARE COORDINATION
CASE MANAGEMENT DISCHARGE SUMMARY      Discharge to: Home w/ family and [facility] SN/Aide/PT/OT    Precertification completed: N/A  Hospital Exemption Notification (HENS) completed: N/A    IMM given: (date) 2/3/25    New Durable Medical Equipment ordered/agency: N/A    Transportation:    Family/car: Personal      Confirmed discharge plan with:     Patient: yes     Family:  yes         Facility/Agency, name: [facility]        RN, name: Wade    Note: Discharging nurse to complete SAADIA, reconcile AVS, and place final copy with patient's discharge packet. RN to ensure that written prescriptions for  Level II medications are sent with patient to the facility as per protocol.      Kat Bailey RN

## 2025-02-04 NOTE — PROGRESS NOTES
Akbar Dominguezany  2/4/2025  [id number]    Chief Complaint: Weakness    Subjective:   This is an 89-year-old male with a past medical history including:  Past Medical History:   Diagnosis Date    Cerebral artery occlusion with cerebral infarction (HCC)     Hyperlipidemia     Hypertension     Seizures (HCC)    She came into the hospital with weakness.  The patient has been on the rehab unit last year and states he did very well with intensive therapy.  The patient was so weak at home he was not able to lift himself up and he had trouble walking at home.  He does have ongoing orthostatic hypotension which he is seeing his primary care physician.  The patient and wife would like to come back to the acute rehab unit for functional mobility retraining before planned discharge home.    Interval history:  Seen in his room this morning.  Bed not available in the rehab unit until the end of the week.  Slept well last night.  No new complaints at this time.      ROS: No CP, SOB, dyspnea    Objective:  Patient Vitals for the past 24 hrs:   BP Temp Temp src Pulse Resp SpO2   02/04/25 1119 (!) 154/68 97.9 °F (36.6 °C) Oral 59 16 94 %   02/04/25 0830 (!) 153/61 98.1 °F (36.7 °C) Oral 59 16 95 %   02/04/25 0314 123/67 97.2 °F (36.2 °C) Oral 61 18 --   02/03/25 2324 (!) 156/67 97.3 °F (36.3 °C) Oral 59 16 96 %   02/03/25 2056 124/74 97.3 °F (36.3 °C) Oral 62 16 95 %   02/03/25 2020 -- -- -- 58 -- --   02/03/25 1530 130/70 97.9 °F (36.6 °C) Oral 58 16 95 %     Gen: No distress, pleasant.   HEENT: Normocephalic, atraumatic.   CV: No audible murmurs, well perfused extremities  Resp: No respiratory distress. No increased WOB  Abd: Soft, nontender nondistended  Ext: No edema. Weakness throughout  Neuro: Alert, oriented, appropriately interactive.     Laboratory data: Available via EMR.     Therapy progress:    PT    Rolling: Level of difficulty - A Little   Sit to Stand from a Chair: Level of difficulty - A Little  Supine to Sit: Level of  difficulty - A Little     Bed to Chair: Physical Assistance Required - A Little  Ambulate Across Room: Physical Assistance Required - A Little  Ascend 3-5 Steps With HR: Physical Assistance Required - A Little    OT    Eating: Physical Assistance Required - None  Grooming: Physical Assistance Required - A Little  LB Dressing: Physical Assistance Required - A Lot  UB Dressing: Physical Assistance Required - A Lot  Bathing: Physical Assistance Required - A Lot  Toileting: Physical Assistance Required - A Little    SLP         Body mass index is 24.41 kg/m².    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    Patient with new functional deficits and ongoing medical complexity. Demonstrates ability to tolerate 3 hours therapy/day and requires close physician oversight to manage acute medical issues. Akbar Arias is a good candidate for acute inpatient rehab when medically appropriate.    Thank you for this consult. Please contact me with any questions or concerns.      Sean Pacheco D.O. M.P.H  PM&R  2/4/2025  1:00 PM      * This document was created using dictation software.  While all precautions were taken to ensure accuracy, errors may have occurred.  Please disregard any typographical errors.

## 2025-02-04 NOTE — FLOWSHEET NOTE
Orthostatic vital signs completed at this time. Pt denies any lightheadedness/dizziness.    02/04/25 1200   Vital Signs   Orthostatic B/P and Pulse? Yes   Blood Pressure Lying 163/63   Pulse Lying 59 PER MINUTE   Blood Pressure Sitting 118/63   Pulse Sitting 61 PER MINUTE   Blood Pressure Standing 112/62   Pulse Standing 64 PER MINUTE

## 2025-02-04 NOTE — PLAN OF CARE
Problem: Chronic Conditions and Co-morbidities  Goal: Patient's chronic conditions and co-morbidity symptoms are monitored and maintained or improved  Outcome: Progressing     Problem: Discharge Planning  Goal: Discharge to home or other facility with appropriate resources  Outcome: Progressing     Problem: Safety - Adult  Goal: Free from fall injury  Outcome: Progressing     Problem: Skin/Tissue Integrity  Goal: Skin integrity remains intact  Description: 1.  Monitor for areas of redness and/or skin breakdown  2.  Assess vascular access sites hourly  3.  Every 4-6 hours minimum:  Change oxygen saturation probe site  4.  Every 4-6 hours:  If on nasal continuous positive airway pressure, respiratory therapy assess nares and determine need for appliance change or resting period  Outcome: Progressing     Problem: ABCDS Injury Assessment  Goal: Absence of physical injury  Outcome: Progressing     Problem: Pain  Goal: Verbalizes/displays adequate comfort level or baseline comfort level  Outcome: Progressing     Problem: Nutrition Deficit:  Goal: Optimize nutritional status  Outcome: Progressing

## 2025-02-04 NOTE — ED NOTES
--Patient/spouse provided with discharge instructions and any prescriptions. IV removed. CMU notified  --Instructions, dosing, and follow-up appointments reviewed with patient/family. No further questions or needs at this time.   --Vital signs and patient stable upon discharge.  --Patient wheelchair to -private vehicle.   Pt provided 2 pair Darrel hose and binder per orders.

## 2025-02-04 NOTE — DISCHARGE INSTR - COC
Continuity of Care Form    Patient Name: Akbar Arias   :  1935  MRN:  [id number]    Admit date:  2025  Discharge date:      Code Status Order: Full Code   Advance Directives:   Advance Care Flowsheet Documentation             Admitting Physician:  Miguel Angel Rose MD  PCP: Damian Smiley MD    Discharging Nurse: Nereyda Koenig RN  Discharging Hospital Unit/Room#: 0541/0541-01  Discharging Unit Phone Number: [phone]    Emergency Contact:   Extended Emergency Contact Information  Primary Emergency Contact: Marielos Arias  Address: [address] [place] [address] [address] [place] [address] [facility]  Home Phone: [phone]  Relation: Spouse  Secondary Emergency Contact: Claudio Shine  Mobile Phone: [phone]  Relation: Grandchild    Past Surgical History:  Past Surgical History:   Procedure Laterality Date    BACK SURGERY  1972    lumbar    EP DEVICE PROCEDURE Left 2024    Insert PPM dual performed by Desiree Mai MD at [facility] CARDIAC CATH LAB    EYE SURGERY Right 2013    cataract extraction with IOL implant    EYE SURGERY Left 13    phaco with IOL       Immunization History:     There is no immunization history on file for this patient.    Active Problems:  Patient Active Problem List   Diagnosis Code    Acute CVA (cerebrovascular accident) (HCC) I63.9    Symptomatic bradycardia R00.1    Asymptomatic bradycardia R00.1    Sinus node dysfunction (HCC) I49.5    Frequent PVCs I49.3    Primary hypertension I10    Pacemaker-BIOTRONIC DC implant 2024-MRI COMPATIBLE Z95.0    Weakness R53.1       Isolation/Infection:   Isolation            No Isolation          Patient Infection Status       None to display                     Nurse Assessment:  Last Vital Signs: BP (!) 153/61   Pulse 59   Temp 98.1 °F (36.7 °C) (Oral)   Resp 16   Ht 1.854 m (6' 1\")   Wt 83.9 kg (185 lb)   SpO2 95%   BMI 24.41 kg/m²     Last documented pain score (0-10 scale): Pain Level:  [P.O.:350]  Out: 550 [Urine:550]    Safety Concerns:     At Risk for Falls    Impairments/Disabilities:      None    Nutrition Therapy:  Current Nutrition Therapy:   - Oral Diet:  General    Routes of Feeding: Oral  Liquids: Thin Liquids  Daily Fluid Restriction: no  Last Modified Barium Swallow with Video (Video Swallowing Test): not done    Treatments at the Time of Hospital Discharge:   Respiratory Treatments:   Oxygen Therapy:  is not on home oxygen therapy.  Ventilator:    - No ventilator support    Rehab Therapies: Physical Therapy  Weight Bearing Status/Restrictions: No weight bearing restrictions  Other Medical Equipment (for information only, NOT a DME order):  walker  Other Treatments:     Patient's personal belongings (please select all that are sent with patient):  All belongings sent with pt/spouse    RN SIGNATURE:  Electronically signed by Nereyda Koenig RN on 2/4/25 at 4:05 PM EST    CASE MANAGEMENT/SOCIAL WORK SECTION    Inpatient Status Date: 2/1/25    Readmission Risk Assessment Score:  [facility] RISK OF UNPLANNED READMISSION 2.0             16.5 Total Score        Discharging to Facility/ Agency   Name: [facility]  Services: Home Health Services  [address] [place] [id number]  [phone]     / signature: {Esignature:[id number]}    PHYSICIAN SECTION    Prognosis: Fair    Condition at Discharge: Stable    Rehab Potential (if transferring to Rehab): Fair    Recommended Labs or Other Treatments After Discharge:     -Follow up with your PCP and cardiologist to discuss control of BP,     -Continue to eat protein at every meal and stay well hydrated    Physician Certification: I certify the above information and transfer of Akbar Arias  is necessary for the continuing treatment of the diagnosis listed and that he requires Home Care for less 30 days.     Update Admission H&P: No change in H&P    PHYSICIAN SIGNATURE:  Electronically signed by

## 2025-02-04 NOTE — DISCHARGE SUMMARY
Hospital Medicine Discharge Summary    Patient: Akbar Arias   : 1935     Hospital:  [facility]  Admit Date: 2025   Discharge Date: 2025    Disposition:  []Home   [x]HHC  []SNF  []Acute Rehab  []LTAC  []Hospice  Code status:  [x]Full  []DNR/CCA  []Limited (DNR/CCA with Do Not Intubate)  []DNRCC  Condition at Discharge: Stable  Primary Care Provider: Damian Smiley MD    Admitting Provider: Miguel Angel Rose MD  Discharge Provider: Miguel Angel Rose MD     Discharge Diagnoses:      Active Hospital Problems    Diagnosis     Weakness [R53.1]        Presenting Admission History:        89 y.o. male with hx of cva, htn, hld, dm2 , pacer(for bradycardia, placed 2024 at [facility], Dr. Mai) rwho presented to [facility] with weakness. Pt was noted to have some mild weakness yesterday(per wife) but not significant. Today, he tried to get up out of bed in the morning and his legs gave out under him. He was not able to lift himself back up, so his grandson had to come help him up. Pt was brought in by EMS.  He denies any palpitations.    -Pt is aware of ongoing orthostatic hypotension and states his PCP is making changes to home meds of late(norvasc is now 2.5mg daily, hydralazine has been stopped, pt started on lexapro)  -of note, pt was recently diagnosed with UTI in the past 1-2 weeks(started taking Cefdinir on 25 and completed 7 day course).  He notes losing weight as well as poor fluid intake per wife. He notes lack of taste in food in general since his stroke(pt went to ).  -he is also on a new med for dizziness, (meclizine)  No prior of fall        Assessment/Plan:        Generalized weakness- posssibly multifactorial(bp med, ?autonomic dysfcn causing orthostatic hypotension, weight loss and FTT)  -Pt/ot eval ordered  -tele  -Pending pacer interrogation, ordered in ER and pending  -repeat am orthostatics still were positive on , repeated on  follow-up. Nonobstructive calculus in the left kidney. Musculoskeletal: No suspicious osteolytic or osteoblastic lesions. Multilevel degenerative changes throughout the spine.     1.  Motion limited exam with bibasilar atelectasis. No acute intrathoracic abnormality. 2.  Scattered pulmonary nodules measuring up to 9 mm in size as detailed above. Some of these may be partially calcified, but this could be re-evaluated on follow-up imaging. Lung Nodule Actionable Findings on Non Lung Screenings: MULTIPLE Pulmonary Nodule >8mm, Solid, Multiple  - Following the Fleischner Society 2017 guidelines for multiple solid nodules >8mm, low risk patients: CT in 3-6 months, then CONSIDER CT in 18-24 months. High risk patients: CT in 3-6 months, then OBTAIN CT in 18-24 months.   qzxv 3.  Coronary artery calcifications. 4.  Cholelithiasis. 5.  Nonobstructive left nephrolithiasis. Electronically signed by Emil Red    XR CHEST PORTABLE    Result Date: 1/22/2025  Chest History: Dizziness Number of views: 1     7 mm nodular density in the right midlung, sixth rib interspace, similar to 12/13/2024. Further evaluation with chest CT is recommended. Left lung is clear. Stable cardiomediastinal silhouette noting left subclavian dual-lead pacemaker. Electronically signed by Mikhail Coffman      Consults:     IP CONSULT TO SPIRITUAL SERVICES  IP CONSULT TO PHYSICAL MEDICINE REHAB  IP CONSULT TO HOME CARE NEEDS    Labs:     Recent Labs     02/04/25  1005   WBC 5.4   HGB 9.9*   HCT 28.5*        Recent Labs     02/04/25  1005      K 4.3      CO2 30   BUN 20   CREATININE 1.6*   CALCIUM 9.4   MG 2.13   PHOS 2.9     No results for input(s): \"PROBNP\", \"TROPHS\" in the last 72 hours.  No results for input(s): \"LABA1C\" in the last 72 hours.  No results for input(s): \"AST\", \"ALT\", \"BILIDIR\", \"BILITOT\", \"ALKPHOS\" in the last 72 hours.  No results for input(s): \"INR\", \"LACTA\", \"TSH\" in the last 72 hours.    Urine Cultures: No

## 2025-02-10 NOTE — PROGRESS NOTES
Physician Progress Note      PATIENT:               JOHN HENDRIX  CSN #:                  [id number]  :                       1935  ADMIT DATE:       2025 10:11 AM  DISCH DATE:        2025 4:49 PM  RESPONDING  PROVIDER #:        Miguel Angel RUIZ MD          QUERY TEXT:    Pt admitted with weakness on 25. Pt noted to have stage 2 pressure ulcer   of coccyx. If possible, please document in progress notes and discharge   summary the present on admission status of the stage 2 pressure ulcer of   coccyx:    The medical record reflects the following:  Risk Factors: 89 year old male w/ weakness  Clinical Indicators:  RN Skin assessment note- \"Does the Patient have a   Wound? Yes wound(s) were present on assessment... Pressure Injury (Stage 3,4,   Unstageable, DTI, NWPT, and Complex wounds) if present, place Wound referral   order by RN under : Yes, stage 2\".  Wound Care consult note-   \"Reason for WOCN Evaluation and Assessment: stage 2 coccyx... Contributing   Factors: chronic pressure, decreased mobility, shear force, and obesity\".  Treatment: Wound care consult, Zinc paste barrier  Options provided:  -- Yes, stage 2 pressure ulcer of coccyx was present at the time of the order   to admit to the hospital  -- No, stage 2 pressure ulcer of coccyx was not present on admission and   developed during the inpatient stay  -- Other - I will add my own diagnosis  -- Disagree - Not applicable / Not valid  -- Disagree - Clinically unable to determine / Unknown  -- Refer to Clinical Documentation Reviewer    PROVIDER RESPONSE TEXT:    Yes, stage 2 pressure ulcer of coccyx was present at the time of the order to   admit to the hospital.    Query created by: Rosetta Orona on 2025 10:42 AM      QUERY TEXT:    Patient admitted with weakness, noted to have orthostatic hypotension and DM2.   If possible, please document in progress notes and discharge summary after   study the etiology

## 2025-03-26 NOTE — PROGRESS NOTES
145 mmol/L Final     Potassium   Date Value Ref Range Status   02/04/2025 4.3 3.5 - 5.1 mmol/L Final   01/30/2025 3.9 3.5 - 5.1 mmol/L Final     Potassium reflex Magnesium   Date Value Ref Range Status   01/31/2025 3.7 3.5 - 5.1 mmol/L Final     CO2   Date Value Ref Range Status   02/04/2025 30 21 - 32 mmol/L Final   01/31/2025 26 21 - 32 mmol/L Final   01/30/2025 25 21 - 32 mmol/L Final     BUN   Date Value Ref Range Status   02/04/2025 20 7 - 20 mg/dL Final   01/31/2025 18 7 - 20 mg/dL Final   01/30/2025 17 7 - 20 mg/dL Final     Creatinine   Date Value Ref Range Status   02/04/2025 1.6 (H) 0.8 - 1.3 mg/dL Final   01/31/2025 1.3 0.8 - 1.3 mg/dL Final   01/30/2025 1.5 (H) 0.8 - 1.3 mg/dL Final     Est, Glom Filt Rate   Date Value Ref Range Status   02/04/2025 41 (A) >60 Final     Comment:     Pediatric calculator link  https://www.kidney.org/professionals/kdoqi/gfr_calculatorped  Effective Oct 3, 2022  These results are not intended for use in patients  <18 years of age.  eGFR results are calculated without  a race factor using the 2021 CKD-EPI equation.  Careful  clinical correlation is recommended, particularly when  comparing to results calculated using previous equations.  The CKD-EPI equation is less accurate in patients with  extremes of muscle mass, extra-renal metabolism of  creatinine, excessive creatinine ingestion, or following  therapy that affects renal tubular secretion.     01/31/2025 53 (A) >60 Final     Comment:     Pediatric calculator link  https://www.kidney.org/professionals/kdoqi/gfr_calculatorped  Effective Oct 3, 2022  These results are not intended for use in patients  <18 years of age.  eGFR results are calculated without  a race factor using the 2021 CKD-EPI equation.  Careful  clinical correlation is recommended, particularly when  comparing to results calculated using previous equations.  The CKD-EPI equation is less accurate in patients with  extremes of muscle mass, extra-renal

## 2025-03-27 ENCOUNTER — CLINICAL SUPPORT (OUTPATIENT)
Dept: CARDIOLOGY CLINIC | Age: 89
End: 2025-03-27

## 2025-03-27 ENCOUNTER — OFFICE VISIT (OUTPATIENT)
Dept: CARDIOLOGY CLINIC | Age: 89
End: 2025-03-27

## 2025-03-27 VITALS
BODY MASS INDEX: 23.49 KG/M2 | OXYGEN SATURATION: 98 % | HEART RATE: 76 BPM | SYSTOLIC BLOOD PRESSURE: 132 MMHG | WEIGHT: 177.25 LBS | HEIGHT: 73 IN | DIASTOLIC BLOOD PRESSURE: 64 MMHG

## 2025-03-27 DIAGNOSIS — I10 PRIMARY HYPERTENSION: ICD-10-CM

## 2025-03-27 DIAGNOSIS — Z95.0 PACEMAKER: ICD-10-CM

## 2025-03-27 DIAGNOSIS — I49.3 FREQUENT PVCS: Primary | ICD-10-CM

## 2025-03-27 DIAGNOSIS — R00.1 BRADYCARDIA: ICD-10-CM

## 2025-03-27 DIAGNOSIS — Z95.0 PACEMAKER: Primary | ICD-10-CM

## 2025-03-27 DIAGNOSIS — E78.2 MIXED HYPERLIPIDEMIA: ICD-10-CM

## 2025-03-27 RX ORDER — LINACLOTIDE 72 UG/1
CAPSULE, GELATIN COATED ORAL
COMMUNITY
Start: 2025-02-23

## 2025-03-27 RX ORDER — LINACLOTIDE 290 UG/1
CAPSULE, GELATIN COATED ORAL
COMMUNITY
Start: 2025-03-10

## 2025-03-27 RX ORDER — CARVEDILOL 3.12 MG/1
6.25 TABLET ORAL 2 TIMES DAILY WITH MEALS
Qty: 60 TABLET | Refills: 0 | Status: SHIPPED | OUTPATIENT
Start: 2025-03-27

## 2025-03-27 NOTE — PATIENT INSTRUCTIONS
Continue amlodipine 2.5 daily--if SBP greater than 170?? Please clarify  Continue aspirin daily  Continue Lipitor 40 mg daily  Continue Plavix 75 mg daily--history of stroke  Increase carvedilol 6.25 mg twice daily with meals  Continue with every 3-month remote device checks  Ok to take Miralax for constipation ---over the counter    Follow up in 6 months Kat WRIGHT

## (undated) DEVICE — NEPTUNE E-SEP SMOKE EVACUATION PENCIL, COATED, 70MM BLADE, PUSH BUTTON SWITCH: Brand: NEPTUNE E-SEP

## (undated) DEVICE — 6FR SAFESHEATH PEEL-AWAY SHEATH

## (undated) DEVICE — PACEMAKER PACK: Brand: MEDLINE INDUSTRIES, INC.

## (undated) DEVICE — Device